# Patient Record
Sex: MALE | Race: WHITE | NOT HISPANIC OR LATINO | Employment: PART TIME | URBAN - METROPOLITAN AREA
[De-identification: names, ages, dates, MRNs, and addresses within clinical notes are randomized per-mention and may not be internally consistent; named-entity substitution may affect disease eponyms.]

---
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
## 2018-01-15 DIAGNOSIS — L70.0 ACNE VULGARIS: ICD-10-CM

## 2018-01-15 RX ORDER — DOXYCYCLINE HYCLATE 100 MG
100 TABLET ORAL 2 TIMES DAILY
Qty: 180 TAB | Refills: 0 | Status: SHIPPED | OUTPATIENT
Start: 2018-01-15 | End: 2018-04-16

## 2018-01-15 NOTE — TELEPHONE ENCOUNTER
Was the patient seen in the last year in this department? Yes     Does patient have an active prescription for medications requested? Yes     Received Request Via: Pharmacy     Requesting 90 day supply

## 2018-03-14 ENCOUNTER — OFFICE VISIT (OUTPATIENT)
Dept: MEDICAL GROUP | Facility: MEDICAL CENTER | Age: 18
End: 2018-03-14
Payer: COMMERCIAL

## 2018-03-14 VITALS
TEMPERATURE: 97 F | RESPIRATION RATE: 16 BRPM | OXYGEN SATURATION: 96 % | DIASTOLIC BLOOD PRESSURE: 72 MMHG | HEIGHT: 65 IN | WEIGHT: 200 LBS | SYSTOLIC BLOOD PRESSURE: 130 MMHG | HEART RATE: 80 BPM | BODY MASS INDEX: 33.32 KG/M2

## 2018-03-14 DIAGNOSIS — Z23 NEED FOR HPV VACCINATION: ICD-10-CM

## 2018-03-14 DIAGNOSIS — Z23 NEED FOR MENINGITIS VACCINATION: ICD-10-CM

## 2018-03-14 DIAGNOSIS — F41.1 GAD (GENERALIZED ANXIETY DISORDER): ICD-10-CM

## 2018-03-14 DIAGNOSIS — E66.9 OBESITY (BMI 30-39.9): ICD-10-CM

## 2018-03-14 PROBLEM — S52.502D: Status: RESOLVED | Noted: 2017-12-22 | Resolved: 2018-03-14

## 2018-03-14 PROCEDURE — 90734 MENACWYD/MENACWYCRM VACC IM: CPT | Performed by: NURSE PRACTITIONER

## 2018-03-14 PROCEDURE — 90472 IMMUNIZATION ADMIN EACH ADD: CPT | Performed by: NURSE PRACTITIONER

## 2018-03-14 PROCEDURE — 90471 IMMUNIZATION ADMIN: CPT | Performed by: NURSE PRACTITIONER

## 2018-03-14 PROCEDURE — 90651 9VHPV VACCINE 2/3 DOSE IM: CPT | Performed by: NURSE PRACTITIONER

## 2018-03-14 PROCEDURE — 99214 OFFICE O/P EST MOD 30 MIN: CPT | Mod: 25 | Performed by: NURSE PRACTITIONER

## 2018-03-14 ASSESSMENT — ENCOUNTER SYMPTOMS: NERVOUS/ANXIOUS: 1

## 2018-03-14 NOTE — PROGRESS NOTES
"Subjective:      Mike Miller is a 17 y.o. male who presents with Anxiety        CC: Very polite 17-year-old male here today for anxiety concerns.    HPI Mike Miller      1. IRMA (generalized anxiety disorder)  Patient is followed by psychiatry for depression and has been doing well on Wellbutrin 150 mg daily. He states however he now has developed anxiety problems. He states that for the past 3 months he is having almost constant anxiety and describes it as \"raging\". He states he has had issues with anxiety in the past and he would like to take medicine that is not addicting to help with this. It appears he no showed for his February 14 appointment with psychiatry and then his appointment in 2 days was rescheduled and now he does not have an appointment until April and wants to start a medicine before then. A noticeable tic is present and he states this has occurred on and off for years and only starts when he becomes especially anxious. He is currently attending school.    2. Need for HPV vaccination  Patient appears to be due for his second HPV vaccine.    3. Need for meningitis vaccination  Patient due for meningitis vaccine.    4. Obesity (BMI 30-39.9)  BMI elevated.  Social History   Substance Use Topics   • Smoking status: Never Smoker   • Smokeless tobacco: Never Used   • Alcohol use No     Current Outpatient Prescriptions   Medication Sig Dispense Refill   • sertraline (ZOLOFT) 50 MG Tab Take 1 Tab by mouth every day. 30 Tab 11   • doxycycline (VIBRAMYCIN) 100 MG Tab Take 1 Tab by mouth 2 Times a Day. 180 Tab 0   • Melatonin 10 MG Tab Take  by mouth every day.     • buPROPion (WELLBUTRIN XL) 150 MG XL tablet Take 1 Tab by mouth every morning. 30 Tab 6     No current facility-administered medications for this visit.      Family History   Problem Relation Age of Onset   • Hypertension Father    • Heart Disease Father      s/p CABG   • Depression Father    • Other Father      Spinal stenosis   • " "Depression Mother      on Wellbutrin     Past Medical History:   Diagnosis Date   • Asthma in remission    • Cold 12/21/2017    \"I had a head cold about 2 weeks ago\"  denies SOB, productive cough   • Psychiatric problem 12/2017    depression       Review of Systems   Psychiatric/Behavioral: The patient is nervous/anxious.    All other systems reviewed and are negative.         Objective:     /72   Pulse 80   Temp 36.1 °C (97 °F)   Resp 16   Ht 1.651 m (5' 5\")   Wt 90.7 kg (200 lb)   SpO2 96%   BMI 33.28 kg/m²      Physical Exam   Constitutional: He is oriented to person, place, and time. He appears well-developed and well-nourished. No distress.   HENT:   Head: Normocephalic and atraumatic.   Right Ear: External ear normal.   Left Ear: External ear normal.   Nose: Nose normal.   Mouth/Throat: Oropharynx is clear and moist.   Eyes: Conjunctivae are normal. Right eye exhibits no discharge. Left eye exhibits no discharge.   Neck: Normal range of motion. Neck supple. No tracheal deviation present. No thyromegaly present.   Cardiovascular: Normal rate, regular rhythm and normal heart sounds.    No murmur heard.  Pulmonary/Chest: Effort normal and breath sounds normal. No respiratory distress. He has no wheezes. He has no rales.   Lymphadenopathy:     He has no cervical adenopathy.   Neurological: He is alert and oriented to person, place, and time. Coordination normal.   Tic of upper extremities noted during visit which comes and goes.   Skin: Skin is warm and dry. No rash noted. He is not diaphoretic. No erythema.   Psychiatric: His behavior is normal. Judgment and thought content normal. His mood appears anxious.   Nursing note and vitals reviewed.              Assessment/Plan:     1. IRMA (generalized anxiety disorder)  Patient is requesting medication to start treating his anxiety now because he feels it is very high. He does not want a benzodiazepine medicine and I discussed options including BuSpar or " an SSRI. We decided to go with 50 mg Zoloft, half tablet daily for first week and then going to one tablet daily to use with his Wellbutrin. I strongly advised him to be sure he makes his appointment with psychiatry to look at options. He did not feel he needed a neurology referral for his Tenex. His last blood work showed normal TSH.  - sertraline (ZOLOFT) 50 MG Tab; Take 1 Tab by mouth every day.  Dispense: 30 Tab; Refill: 11    2. Need for HPV vaccination  I have placed the below orders and discussed them with an approved delegating provider. The MA is performing the below orders under the direction of Dr. Stone    - 9VHPV VACCINE 2-3 DOSE IM    3. Need for meningitis vaccination  I have placed the below orders and discussed them with an approved delegating provider. The MA is performing the below orders under the direction of Dr. Stone    - MENINGOCOCCAL CONJUGATE VACCINE 4-VALENT IM    4. Obesity (BMI 30-39.9)    - Patient identified as having weight management issue.  Appropriate orders and counseling given.

## 2018-04-16 ENCOUNTER — OFFICE VISIT (OUTPATIENT)
Dept: BEHAVIORAL HEALTH | Facility: PHYSICIAN GROUP | Age: 18
End: 2018-04-16
Payer: COMMERCIAL

## 2018-04-16 VITALS
WEIGHT: 196 LBS | BODY MASS INDEX: 32.65 KG/M2 | SYSTOLIC BLOOD PRESSURE: 153 MMHG | HEIGHT: 65 IN | DIASTOLIC BLOOD PRESSURE: 81 MMHG | HEART RATE: 92 BPM

## 2018-04-16 DIAGNOSIS — F32.A DEPRESSIVE DISORDER: ICD-10-CM

## 2018-04-16 DIAGNOSIS — F40.10 SOCIAL ANXIETY DISORDER: ICD-10-CM

## 2018-04-16 DIAGNOSIS — F41.1 GAD (GENERALIZED ANXIETY DISORDER): ICD-10-CM

## 2018-04-16 PROBLEM — F41.9 ANXIETY DISORDER: Status: ACTIVE | Noted: 2018-04-16

## 2018-04-16 PROCEDURE — 99214 OFFICE O/P EST MOD 30 MIN: CPT | Performed by: PSYCHIATRY & NEUROLOGY

## 2018-04-16 RX ORDER — BUPROPION HYDROCHLORIDE 150 MG/1
150 TABLET, EXTENDED RELEASE ORAL EVERY MORNING
Qty: 90 TAB | Refills: 1 | Status: SHIPPED | OUTPATIENT
Start: 2018-04-16 | End: 2018-10-16 | Stop reason: SDUPTHER

## 2018-04-16 ASSESSMENT — PATIENT HEALTH QUESTIONNAIRE - PHQ9: CLINICAL INTERPRETATION OF PHQ2 SCORE: 0

## 2018-05-17 DIAGNOSIS — L70.0 ACNE VULGARIS: ICD-10-CM

## 2018-05-17 RX ORDER — DOXYCYCLINE HYCLATE 100 MG
100 TABLET ORAL 2 TIMES DAILY
Qty: 180 TAB | Refills: 3 | Status: SHIPPED | OUTPATIENT
Start: 2018-05-17 | End: 2020-05-13

## 2018-08-02 ENCOUNTER — OFFICE VISIT (OUTPATIENT)
Dept: BEHAVIORAL HEALTH | Facility: PHYSICIAN GROUP | Age: 18
End: 2018-08-02
Payer: COMMERCIAL

## 2018-08-02 DIAGNOSIS — F40.10 SOCIAL ANXIETY DISORDER: ICD-10-CM

## 2018-08-02 DIAGNOSIS — F41.1 GAD (GENERALIZED ANXIETY DISORDER): ICD-10-CM

## 2018-08-02 PROCEDURE — 90791 PSYCH DIAGNOSTIC EVALUATION: CPT | Performed by: PSYCHOLOGIST
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
## 2018-09-05 ENCOUNTER — OFFICE VISIT (OUTPATIENT)
Dept: BEHAVIORAL HEALTH | Facility: PHYSICIAN GROUP | Age: 18
End: 2018-09-05
Payer: COMMERCIAL

## 2018-09-05 DIAGNOSIS — F40.10 SOCIAL ANXIETY DISORDER: ICD-10-CM

## 2018-09-05 DIAGNOSIS — F41.1 GAD (GENERALIZED ANXIETY DISORDER): ICD-10-CM

## 2018-09-05 PROCEDURE — 90834 PSYTX W PT 45 MINUTES: CPT | Performed by: PSYCHOLOGIST

## 2018-09-05 NOTE — BH THERAPY
[facility] Therapy Progress Note    Patient Name: Mike Miller  Patient MRN: [id number]  Today's Date: 9/5/2018     Type of session:Individual psychotherapy  Length of session: 45 minutes  Persons in attendance:Patient    Subjective/New Info: The patient ID/Chief Complaint:  The patient is a 18 year old male, single, .  The patient was referred by psychiatry and voluntarily presented for an individual intake to address anxiety symptoms including chronic worrying and mild to moderate social anxiety with some evidence of vocal tic related to anxiety.  Reviewed limits of confidentiality and [facility] policies; patient expressed understanding and agreed to voluntarily proceed with evaluation and treatment.     Interval History:   Session Focus: The patient's estimated global assessment of functioning suggests a moderate degree of emotional distress and difficulty dealing with relationships, work and/or school life. The patient's affective and emotional state appeared moderately anxious. He has expereinced some improvement regarding anxiety symptoms continues to use marijuana to assist with sleep. Educated the patient about elements associated with the treatment of depression and anxiety.    Therapeutic Intervention: Cognitive Behavioral Therapy      Planned Intervention: examining automatic thought and using thought records    Objective/Observations:      Patient did not present in acute distress. Patient was appropriately groomed. Patient was alert and oriented x4. Eye contact was appropriate. No abnormalities in attention or concentration were noted. No abnormalities of movement present; psychomotor activity was normal. Speech was fluent and regular in rhythm, rate, volume, and tone. Thought processes Linear, Logical and Goal Directed. There was no evidence of thought disorder. No auditory or visual hallucinations. Long and short term memory appeared to be intact.  Insight, judgment, and impulse control were deemed to be good.  Reported mood was “anxious.” Affect was full-ranging and appropriate to thought content and conversation.  Patient denied past and current suicidal and homicidal ideation in plan, intent, and preparatory behavior.         Diagnoses:   1. IRMA (generalized anxiety disorder)    2. Social anxiety disorder       The patient denied any suicide-related ideation and/or behaviors and intent/plan, denied thoughts about death and dying both in session and during the period since last appointment, or past 2 weeks.    Risk Level: Not Currently at Clinically Significant Risk  Hospitalization is not deemed necessary at this time as the patient does not present a clear or imminent danger to self or others. No indication for pursuing higher level of care. Outpatient management is currently most appropriate and least restrictive level of care.  Current risk:   SUICIDE: Low   Homicide: Not applicable   Self-harm: Not applicable   Relapse: Not applicable   Other:    Safety Plan reviewed? Not Indicated   If evidence of imminent risk is present, intervention/plan:         Treatment Goal(s)/Objective(s) addressed:    Anxiety   Goal: Develop strategies to reduce symptoms, or   Reduce anxiety and improve coping skills   Report feeling more positive about self and abilities during therapy sessions   Develop strategies for thought distraction when fixating on the future     Progress toward Treatment Goals: Mild improvement    Plan:  - Next appointment scheduled:  10/3/2018    Jordin Chavez III, Ed.D.  9/5/2018

## 2018-10-18 ENCOUNTER — OFFICE VISIT (OUTPATIENT)
Dept: BEHAVIORAL HEALTH | Facility: CLINIC | Age: 18
End: 2018-10-18
Payer: COMMERCIAL

## 2018-10-18 DIAGNOSIS — F40.10 SOCIAL ANXIETY DISORDER: ICD-10-CM

## 2018-10-18 DIAGNOSIS — F41.1 GAD (GENERALIZED ANXIETY DISORDER): ICD-10-CM

## 2018-10-18 PROCEDURE — 90834 PSYTX W PT 45 MINUTES: CPT | Performed by: PSYCHOLOGIST

## 2018-10-18 NOTE — BH THERAPY
[facility] Therapy Progress Note    Patient Name: Mike Miller  Patient MRN: [id number]  Today's Date: 10/18/2018     Type of session:Individual psychotherapy  Length of session: 45 minutes  Persons in attendance:Patient    Subjective/New Info: The patient ID/Chief Complaint:  The patient is a 18 year old male, single, .  The patient was referred by psychiatry and voluntarily presented for an individual intake to address anxiety symptoms including chronic worrying and mild to moderate social anxiety with some evidence of vocal tic related to anxiety.  Reviewed limits of confidentiality and [facility] policies; patient expressed understanding and agreed to voluntarily proceed with evaluation and treatment.     Interval History:   Session Focus: The patient's estimated global assessment of functioning suggests a moderate degree of emotional distress and difficulty dealing with relationships, work and/or school life. The patient's affective and emotional state appeared mildy anxious. He has expereinced some improvement regarding anxiety symptoms continues to use marijuana to assist with sleep. Explored with the patient his social anxiety    Therapeutic Intervention: Cognitive Behavioral Therapy      Planned Intervention: examining automatic thought and using thought records    Objective/Observations:      Patient did not present in acute distress. Patient was appropriately groomed. Patient was alert and oriented x4. Eye contact was appropriate. No abnormalities in attention or concentration were noted. No abnormalities of movement present; psychomotor activity was normal. Speech was fluent and regular in rhythm, rate, volume, and tone. Thought processes Linear, Logical and Goal Directed. There was no evidence of thought disorder. No auditory or visual hallucinations. Long and short term memory appeared to be intact. Insight, judgment, and impulse control were deemed to  be good.  Reported mood was “anxious.” Affect was full-ranging and appropriate to thought content and conversation.  Patient denied past and current suicidal and homicidal ideation in plan, intent, and preparatory behavior.         Diagnoses:   1. IRMA (generalized anxiety disorder)    2. Social anxiety disorder       The patient denied any suicide-related ideation and/or behaviors and intent/plan, denied thoughts about death and dying both in session and during the period since last appointment, or past 2 weeks.    Risk Level: Not Currently at Clinically Significant Risk  Hospitalization is not deemed necessary at this time as the patient does not present a clear or imminent danger to self or others. No indication for pursuing higher level of care. Outpatient management is currently most appropriate and least restrictive level of care.  Current risk:   SUICIDE: Low   Homicide: Not applicable   Self-harm: Not applicable   Relapse: Not applicable   Other:    Safety Plan reviewed? Not Indicated   If evidence of imminent risk is present, intervention/plan:         Treatment Goal(s)/Objective(s) addressed:    Anxiety   Goal: Develop strategies to reduce symptoms, or   Reduce anxiety and improve coping skills   Report feeling more positive about self and abilities during therapy sessions   Develop strategies for thought distraction when fixating on the future     Progress toward Treatment Goals: Mild improvement    Plan:    1) The patient will return to the clinic 2-3 weeks.  2) Crisis Response Plan:  Reviewed emergency resources with the patient and the patient expressed understanding including:  If feeling suicidal, patient will call or present to the [facility] during duty hours or present to closest ED ([facility] or [facility], call 911 or crisis hotline (6-782-247-OFUN) after duty hours.  3) Referrals/Consults:  N/A  4) Barriers to Learning:   No  5) Readiness to Learn:  Yes  6) Cultural Concerns:  No  7) Patient voiced understanding of, and agreement with, plan and goals as annotated above.  8) Declare these services are medically necessary and appropriate to the patient’s diagnosis and needs  9) The point of contact at the [facility] regarding this evaluation is Dr. Chavez, Psychologist.      Jordin Chavez III, Ed.D.  10/18/2018

## 2018-12-06 ENCOUNTER — OFFICE VISIT (OUTPATIENT)
Dept: BEHAVIORAL HEALTH | Facility: CLINIC | Age: 18
End: 2018-12-06
Payer: COMMERCIAL

## 2018-12-06 DIAGNOSIS — F41.1 GAD (GENERALIZED ANXIETY DISORDER): ICD-10-CM

## 2018-12-06 DIAGNOSIS — F40.10 SOCIAL ANXIETY DISORDER: ICD-10-CM

## 2018-12-06 PROCEDURE — 90834 PSYTX W PT 45 MINUTES: CPT | Performed by: PSYCHOLOGIST

## 2018-12-07 NOTE — BH THERAPY
[facility]  Therapy Progress Note    Patient Name: Mike Miller  Patient MRN: [id number]  Today's Date: 12/6/2018     Type of session:Individual psychotherapy  Length of session: 45 minutes  Persons in attendance:Patient    Subjective/New Info: The patient ID/Chief Complaint:  The patient is a 18 year old male, single, .  The patient was referred by psychiatry and voluntarily presented for an individual intake to address anxiety symptoms including chronic worrying and mild to moderate social anxiety with some evidence of vocal tic related to anxiety.  Reviewed limits of confidentiality and [facility] policies; patient expressed understanding and agreed to voluntarily proceed with evaluation and treatment.     Interval History:   Session Focus: The patient's estimated global assessment of functioning suggests a moderate degree of emotional distress and difficulty dealing with relationships, work and/or school life. The patient's affective and emotional state appeared mildy anxious.  Patient reports some improvement in social anxiety is interested in moving forward in unstructured activities to examine his ability to socially interact.  Educated the patient about how the use of cannabis may not be helpful in dealing with his social anxiety as he may contribute his success to the cannabis versus his hard work.  The patient was able to identify the cognitive error of jumping to conclusion as one of the primary reasons he disengages in social interactions.  Use problem solving to explore ways the patient could increase his social interactions in a semi-structured environment and/or preparing for interactions by having a set of questions.  Patient does recognize that his most difficult part of the social interaction is the initiation versus the maintenance.    Therapeutic Intervention: Cognitive Behavioral Therapy      Planned Intervention: examining automatic thought and  using thought records    Objective/Observations:      Patient did not present in acute distress. Patient was appropriately groomed. Patient was alert and oriented x4. Eye contact was appropriate. No abnormalities in attention or concentration were noted. No abnormalities of movement present; psychomotor activity was normal. Speech was fluent and regular in rhythm, rate, volume, and tone. Thought processes Linear, Logical and Goal Directed. There was no evidence of thought disorder. No auditory or visual hallucinations. Long and short term memory appeared to be intact. Insight, judgment, and impulse control were deemed to be good.  Reported mood was “anxious.” Affect was full-ranging and appropriate to thought content and conversation.  Patient denied past and current suicidal and homicidal ideation in plan, intent, and preparatory behavior.         Diagnoses:   1. IRMA (generalized anxiety disorder)    2. Social anxiety disorder       The patient denied any suicide-related ideation and/or behaviors and intent/plan, denied thoughts about death and dying both in session and during the period since last appointment, or past 2 weeks.    Risk Level: Not Currently at Clinically Significant Risk  Hospitalization is not deemed necessary at this time as the patient does not present a clear or imminent danger to self or others. No indication for pursuing higher level of care. Outpatient management is currently most appropriate and least restrictive level of care.  Current risk:   SUICIDE: Low   Homicide: Not applicable   Self-harm: Not applicable   Relapse: Not applicable   Other:    Safety Plan reviewed? Not Indicated   If evidence of imminent risk is present, intervention/plan:         Treatment Goal(s)/Objective(s) addressed:    Anxiety   Goal: Develop strategies to reduce symptoms, or   Reduce anxiety and improve coping skills   Report feeling more positive about self and abilities during therapy sessions   Develop  strategies for thought distraction when fixating on the future     Progress toward Treatment Goals: Mild improvement    Plan:    1) The patient will return to the clinic 2-3 weeks.  2) Crisis Response Plan:  Reviewed emergency resources with the patient and the patient expressed understanding including:  If feeling suicidal, patient will call or present to the [facility] during duty hours or present to closest ED ([facility] or [facility], call 911 or crisis hotline ([phone]-BWKT) after duty hours.  3) Referrals/Consults:  N/A  4) Barriers to Learning:  No  5) Readiness to Learn:  Yes  6) Cultural Concerns:  No  7) Patient voiced understanding of, and agreement with, plan and goals as annotated above.  8) Declare these services are medically necessary and appropriate to the patient’s diagnosis and needs  9) The point of contact at the [facility] regarding this evaluation is Dr. Chavez, Psychologist.      Jordin Chavez III, Ed.D.  12/6/2018

## 2019-01-03 ENCOUNTER — OFFICE VISIT (OUTPATIENT)
Dept: BEHAVIORAL HEALTH | Facility: CLINIC | Age: 19
End: 2019-01-03
Payer: COMMERCIAL

## 2019-01-03 DIAGNOSIS — F41.1 GAD (GENERALIZED ANXIETY DISORDER): ICD-10-CM

## 2019-01-03 DIAGNOSIS — F40.10 SOCIAL ANXIETY DISORDER: ICD-10-CM

## 2019-01-03 PROCEDURE — 90834 PSYTX W PT 45 MINUTES: CPT | Performed by: PSYCHOLOGIST

## 2019-01-04 NOTE — BH THERAPY
[facility]  Therapy Progress Note    Patient Name: Mike Miller  Patient MRN: [id number]  Today's Date: 1/3/2019    Type of session:Individual psychotherapy  Length of session: 45 minutes  Persons in attendance:Patient    Subjective/New Info: The patient ID/Chief Complaint:  The patient is a 18 year old male, single, .  The patient was referred by psychiatry and voluntarily presented for an individual intake to address anxiety symptoms including chronic worrying and mild to moderate social anxiety with some evidence of vocal tic related to anxiety.  Reviewed limits of confidentiality and [facility] policies; patient expressed understanding and agreed to voluntarily proceed with evaluation and treatment.     Interval History:   Session Focus: The patient's estimated global assessment of functioning suggests a moderate degree of emotional distress and difficulty dealing with relationships, work and/or school life. The patient's affective and emotional state appeared mildy anxious.  Discussed with patient barriers and automatic thoughts associated with social anxiety.  The patient was able to identify that he frequently does not let people in for fear that they may take advantage of him at the present time he has a small number and many of them have similar characteristics.  On a more positive note the patient did follow through and asked a girl out on the date.  Patient is becoming increasingly receptive that his use of cannabis may be one of the things that is contributing to his anxiety condition.  Patient is doing a good job of using thought records as a means of challenging automatic thoughts and is beginning to understand cognitive errors associated with his anxiety    Therapeutic Intervention: Cognitive Behavioral Therapy      Planned Intervention: examining automatic thought and using thought records    Objective/Observations:        Patient did not present in  acute distress. Patient was appropriately groomed. Patient was alert and oriented x4. Eye contact was appropriate. No abnormalities in attention or concentration were noted. No abnormalities of movement present; psychomotor activity was normal. Speech was fluent and regular in rhythm, rate, volume, and tone. Thought processes linear, logical and goal directed. There was no evidence of thought disorder. No auditory or visual hallucinations. Long and short term memory appeared to be intact. Insight, judgment, and impulse control were deemed to be good.  Reported mood was “anxious.” Affect was full-ranging and appropriate to thought content and conversation.  Patient denied past and current suicidal and homicidal ideation in plan, intent, and preparatory behavior.         Diagnoses:   1. Social anxiety disorder    2. IRMA (generalized anxiety disorder)       The patient denied any suicide-related ideation and/or behaviors and intent/plan, denied thoughts about death and dying both in session and during the period since last appointment, or past 2 weeks.    Risk Level: Not Currently at Clinically Significant Risk  Hospitalization is not deemed necessary at this time as the patient does not present a clear or imminent danger to self or others. No indication for pursuing higher level of care. Outpatient management is currently most appropriate and least restrictive level of care.  Current risk:   SUICIDE: Low   Homicide: Not applicable   Self-harm: Not applicable   Relapse: Not applicable   Other:    Safety Plan reviewed? Not Indicated   If evidence of imminent risk is present, intervention/plan:         Treatment Goal(s)/Objective(s) addressed:    Anxiety   Goal: Develop strategies to reduce symptoms, or   Reduce anxiety and improve coping skills   Report feeling more positive about self and abilities during therapy sessions   Develop strategies for thought distraction when fixating on the future     Progress toward  Treatment Goals: Mild improvement    Plan:    1) The patient will return to the clinic 2-3 weeks.  2) Crisis Response Plan:  Reviewed emergency resources with the patient and the patient expressed understanding including:  If feeling suicidal, patient will call or present to the [facility] during duty hours or present to closest ED ([facility] or [facility], call 911 or crisis hotline ([phone]-RINF) after duty hours.  3) Referrals/Consults:  N/A  4) Barriers to Learning:  No  5) Readiness to Learn:  Yes  6) Cultural Concerns:  No  7) Patient voiced understanding of, and agreement with, plan and goals as annotated above.  8) Declare these services are medically necessary and appropriate to the patient’s diagnosis and needs  9) The point of contact at the [facility] regarding this evaluation is Dr. Chavez, Psychologist.      Jordin Chavez III, Ed.D.  1/3/2019

## 2019-01-24 ENCOUNTER — OFFICE VISIT (OUTPATIENT)
Dept: BEHAVIORAL HEALTH | Facility: CLINIC | Age: 19
End: 2019-01-24
Payer: COMMERCIAL

## 2019-01-24 DIAGNOSIS — F40.10 SOCIAL ANXIETY DISORDER: ICD-10-CM

## 2019-01-24 DIAGNOSIS — F41.1 GAD (GENERALIZED ANXIETY DISORDER): ICD-10-CM

## 2019-01-24 PROCEDURE — 90834 PSYTX W PT 45 MINUTES: CPT | Performed by: PSYCHOLOGIST

## 2019-01-24 RX ORDER — BUPROPION HYDROCHLORIDE 150 MG/1
150 TABLET, EXTENDED RELEASE ORAL EVERY MORNING
Qty: 90 TAB | Refills: 0 | Status: SHIPPED | OUTPATIENT
Start: 2019-01-24 | End: 2019-03-27

## 2019-01-24 NOTE — BH THERAPY
[facility] Therapy Progress Note    Patient Name: Mike Miller  Patient MRN: [id number]  Today's Date: 1/24/2019    Type of session:Individual psychotherapy  Length of session: 45 minutes  Persons in attendance:Patient    Subjective/New Info: The patient ID/Chief Complaint:  The patient is a 18 year old male, single, .  The patient was referred by psychiatry and voluntarily presented for an individual intake to address anxiety symptoms including chronic worrying and mild to moderate social anxiety with some evidence of vocal tic related to anxiety.  Reviewed limits of confidentiality and [facility] policies; patient expressed understanding and agreed to voluntarily proceed with evaluation and treatment.     Interval History:   Session Focus: The patient's estimated global assessment of functioning suggests a moderate degree of emotional distress and difficulty dealing with relationships, work and/or school life. The patient's affective and emotional state appeared mildy anxious.  The patient is increasing his awareness of the impact of his social anxiety.  He reported that he is about ready to run out of his Wellbutrin but he recently missed an appointment with his psychiatrist.  Consulted the psychiatrist about the possibility of a refill.  The patient is definitely increasing his awareness dysfunctional schemas associated with anxiety and starting the challenge some of his beliefs about social interactions.    Therapeutic Intervention: Cognitive Behavioral Therapy      Planned Intervention: examining automatic thought and using thought records    Objective/Observations:        Patient did not present in acute distress. Patient was appropriately groomed. Patient was alert and oriented x4. Eye contact was appropriate. No abnormalities in attention or concentration were noted. No abnormalities of movement present; psychomotor activity was normal. Speech was fluent and  regular in rhythm, rate, volume, and tone. Thought processes linear, logical and goal directed. There was no evidence of thought disorder. No auditory or visual hallucinations. Long and short term memory appeared to be intact. Insight, judgment, and impulse control were deemed to be good.  Reported mood was “anxious.” Affect was full-ranging and appropriate to thought content and conversation.  Patient denied past and current suicidal and homicidal ideation in plan, intent, and preparatory behavior.         Diagnoses:   1. IRMA (generalized anxiety disorder)    2. Social anxiety disorder       The patient denied any suicide-related ideation and/or behaviors and intent/plan, denied thoughts about death and dying both in session and during the period since last appointment, or past 2 weeks.    Risk Level: Not Currently at Clinically Significant Risk  Hospitalization is not deemed necessary at this time as the patient does not present a clear or imminent danger to self or others. No indication for pursuing higher level of care. Outpatient management is currently most appropriate and least restrictive level of care.  Current risk:   SUICIDE: Low   Homicide: Not applicable   Self-harm: Not applicable   Relapse: Not applicable   Other:    Safety Plan reviewed? Not Indicated   If evidence of imminent risk is present, intervention/plan:         Treatment Goal(s)/Objective(s) addressed:    Anxiety   Goal: Develop strategies to reduce symptoms, or   Reduce anxiety and improve coping skills   Report feeling more positive about self and abilities during therapy sessions   Develop strategies for thought distraction when fixating on the future     Progress toward Treatment Goals: Mild improvement    Plan:    1) The patient will return to the clinic 2-3 weeks.  2) Crisis Response Plan:  Reviewed emergency resources with the patient and the patient expressed understanding including:  If feeling suicidal, patient will call or present  to the [facility] during duty hours or present to closest ED ([facility] or [facility], call 911 or crisis hotline ([phone]-XCCC) after duty hours.  3) Referrals/Consults:  N/A  4) Barriers to Learning:  No  5) Readiness to Learn:  Yes  6) Cultural Concerns:  No  7) Patient voiced understanding of, and agreement with, plan and goals as annotated above.  8) Declare these services are medically necessary and appropriate to the patient’s diagnosis and needs  9) The point of contact at the [facility] regarding this evaluation is Dr. Chavez, Psychologist.      Jordin Chavez III, Ed.D.  1/24/2019

## 2019-02-25 ENCOUNTER — OFFICE VISIT (OUTPATIENT)
Dept: BEHAVIORAL HEALTH | Facility: CLINIC | Age: 19
End: 2019-02-25
Payer: COMMERCIAL

## 2019-02-25 DIAGNOSIS — F40.10 SOCIAL ANXIETY DISORDER: ICD-10-CM

## 2019-02-25 DIAGNOSIS — F41.1 GAD (GENERALIZED ANXIETY DISORDER): ICD-10-CM

## 2019-02-25 PROCEDURE — 90834 PSYTX W PT 45 MINUTES: CPT | Performed by: PSYCHOLOGIST

## 2019-02-25 NOTE — BH THERAPY
[facility] Therapy Progress Note    Patient Name: Mike Miller  Patient MRN: [id number]  Today's Date: 2/25/2019    Type of session:Individual psychotherapy  Length of session: 45 minutes  Persons in attendance:Patient    Subjective/New Info: The patient ID/Chief Complaint:  The patient is a 18 year old male, single, .  The patient was referred by psychiatry and voluntarily presented for an individual intake to address anxiety symptoms including chronic worrying and mild to moderate social anxiety with some evidence of vocal tic related to anxiety.  Reviewed limits of confidentiality and [facility] policies; patient expressed understanding and agreed to voluntarily proceed with evaluation and treatment.     Interval History:   Session Focus: The patient's estimated global assessment of functioning suggests a moderate degree of emotional distress and difficulty dealing with relationships, work and/or school life. The patient's affective and emotional state appeared mildy anxious.  The patient continues to examine automatic thoughts and schemas associated with his social anxiety and his general anxiety response.  The patient also recognizes some of his behaviors are designed to prevent others from establishing relationships because of the fear of being rejected.    Therapeutic Intervention: Cognitive Behavioral Therapy      Planned Intervention: examining automatic thought and using thought records    Objective/Observations:      Patient did not present in acute distress. Patient was appropriately groomed. Patient was alert and oriented x4. Eye contact was appropriate. No abnormalities in attention or concentration were noted. No abnormalities of movement present; psychomotor activity was normal. Speech was fluent and regular in rhythm, rate, volume, and tone. Thought processes linear, logical and goal directed. There was no evidence of thought disorder. No auditory or  visual hallucinations. Long and short term memory appeared to be intact. Insight, judgment, and impulse control were deemed to be good.  Reported mood was “comfortable.” Affect was full-ranging and appropriate to thought content and conversation.  Patient denied past and current suicidal and homicidal ideation in plan, intent, and preparatory behavior.         Diagnoses:   1. IRMA (generalized anxiety disorder)    2. Social anxiety disorder       The patient denied any suicide-related ideation and/or behaviors and intent/plan, denied thoughts about death and dying both in session and during the period since last appointment, or past 2 weeks.    Risk Level: Not Currently at Clinically Significant Risk  Hospitalization is not deemed necessary at this time as the patient does not present a clear or imminent danger to self or others. No indication for pursuing higher level of care. Outpatient management is currently most appropriate and least restrictive level of care.  Current risk:   SUICIDE: Low   Homicide: Not applicable   Self-harm: Not applicable   Relapse: Not applicable   Other:    Safety Plan reviewed? Not Indicated   If evidence of imminent risk is present, intervention/plan:         Treatment Goal(s)/Objective(s) addressed:    Anxiety   Goal: Develop strategies to reduce symptoms, or   Reduce anxiety and improve coping skills   Report feeling more positive about self and abilities during therapy sessions   Develop strategies for thought distraction when fixating on the future     Progress toward Treatment Goals: Mild improvement    Plan:    1) The patient will return to the clinic 2-3 weeks.  2) Crisis Response Plan:  Reviewed emergency resources with the patient and the patient expressed understanding including:  If feeling suicidal, patient will call or present to the [facility] during duty hours or present to closest ED ([facility] or [facility],  call 911 or crisis hotline (2-403-237-PWUB) after duty hours.  3) Referrals/Consults:  N/A  4) Barriers to Learning:  No  5) Readiness to Learn:  Yes  6) Cultural Concerns:  No  7) Patient voiced understanding of, and agreement with, plan and goals as annotated above.  8) Declare these services are medically necessary and appropriate to the patient’s diagnosis and needs  9) The point of contact at the [facility] regarding this evaluation is Dr. Chavez, Psychologist.      Jordin Chavez III, Ed.D.  [id number]

## 2019-03-05 ENCOUNTER — OFFICE VISIT (OUTPATIENT)
Dept: BEHAVIORAL HEALTH | Facility: CLINIC | Age: 19
End: 2019-03-05
Payer: COMMERCIAL

## 2019-03-05 DIAGNOSIS — F41.1 GAD (GENERALIZED ANXIETY DISORDER): ICD-10-CM

## 2019-03-05 DIAGNOSIS — F40.10 SOCIAL ANXIETY DISORDER: ICD-10-CM

## 2019-03-05 PROCEDURE — 90834 PSYTX W PT 45 MINUTES: CPT | Performed by: PSYCHOLOGIST

## 2019-03-06 NOTE — BH THERAPY
[facility]  Therapy Progress Note    Patient Name: Mike Miller  Patient MRN: [id number]  Today's Date: 3/5/2019    Type of session:Individual psychotherapy  Length of session: 50 minutes  Persons in attendance:Patient    Subjective/New Info: The patient ID/Chief Complaint:  The patient is a 18 year old male, single, .  The patient was referred by psychiatry and voluntarily presented for an individual intake to address anxiety symptoms including chronic worrying and mild to moderate social anxiety with some evidence of vocal tic related to anxiety.  Reviewed limits of confidentiality and [facility] policies; patient expressed understanding and agreed to voluntarily proceed with evaluation and treatment.     Interval History:   Session Focus: The patient's estimated global assessment of functioning suggests a moderate degree of emotional distress and difficulty dealing with relationships, work and/or school life. The patient's affective and emotional state appeared mildy anxious.  The patient reports over the last few weeks he is experienced worsening of anxiety symptoms and depressive symptoms he does recognize that these difficulties started shortly after he had to put his dog down.  Patient also discussed today concerns in the past that he has had about being physically ill with no objective evidence from physical examination and lab work.  Discussed with the patient the possible benefits of psychological testing in order to provide additional clarification and comparison of his symptoms to a known population.  The patient indicated that he would think about the benefits of psychological testing in the future.  Encouraged the patient to continue challenging negative automatic thoughts associated with his social anxiety.    Therapeutic Intervention: Cognitive Behavioral Therapy      Planned Intervention: examining automatic thought and using thought  records    Objective/Observations:      Patient did not present in acute distress. Patient was appropriately groomed. Patient was alert and oriented x4. Eye contact was appropriate. No abnormalities in attention or concentration were noted. No abnormalities of movement present; psychomotor activity was normal. Speech was fluent and regular in rhythm, rate, volume, and tone. Thought processes linear, logical and goal directed. There was no evidence of thought disorder. No auditory or visual hallucinations. Long and short term memory appeared to be intact. Insight, judgment, and impulse control were deemed to be good.  Reported mood was “anxious.” Affect was full-ranging and appropriate to thought content and conversation.  Patient denied past and current suicidal and homicidal ideation in plan, intent, and preparatory behavior.         Diagnoses:   1. Social anxiety disorder    2. IRMA (generalized anxiety disorder)       The patient denied any suicide-related ideation and/or behaviors and intent/plan, denied thoughts about death and dying both in session and during the period since last appointment, or past 2 weeks.    Risk Level: Not Currently at Clinically Significant Risk  Hospitalization is not deemed necessary at this time as the patient does not present a clear or imminent danger to self or others. No indication for pursuing higher level of care. Outpatient management is currently most appropriate and least restrictive level of care.  Current risk:   SUICIDE: Low   Homicide: Not applicable   Self-harm: Not applicable   Relapse: Not applicable   Other:    Safety Plan reviewed? Not Indicated   If evidence of imminent risk is present, intervention/plan:         Treatment Goal(s)/Objective(s) addressed:    Anxiety   Goal: Develop strategies to reduce symptoms, or   Reduce anxiety and improve coping skills   Report feeling more positive about self and abilities during therapy sessions   Develop strategies for  thought distraction when fixating on the future     Progress toward Treatment Goals: Mild improvement    Plan:    1) The patient will return to the clinic 2-3 weeks.  2) Crisis Response Plan:  Reviewed emergency resources with the patient and the patient expressed understanding including:  If feeling suicidal, patient will call or present to the [facility] during duty hours or present to closest ED ([facility] or [facility], call 911 or crisis hotline ([phone]-QPTZ) after duty hours.  3) Referrals/Consults:  N/A  4) Barriers to Learning:  No  5) Readiness to Learn:  Yes  6) Cultural Concerns:  No  7) Patient voiced understanding of, and agreement with, plan and goals as annotated above.  8) Declare these services are medically necessary and appropriate to the patient’s diagnosis and needs  9) The point of contact at the [facility] regarding this evaluation is Dr. Chavez, Psychologist.      Jordin Chavez III, Ed.D.  3/5/2019

## 2019-03-27 ENCOUNTER — OFFICE VISIT (OUTPATIENT)
Dept: BEHAVIORAL HEALTH | Facility: CLINIC | Age: 19
End: 2019-03-27
Payer: COMMERCIAL

## 2019-03-27 VITALS
HEIGHT: 65 IN | WEIGHT: 196 LBS | BODY MASS INDEX: 32.65 KG/M2 | SYSTOLIC BLOOD PRESSURE: 119 MMHG | DIASTOLIC BLOOD PRESSURE: 76 MMHG | HEART RATE: 70 BPM

## 2019-03-27 DIAGNOSIS — F32.A DEPRESSIVE DISORDER: ICD-10-CM

## 2019-03-27 DIAGNOSIS — F40.10 SOCIAL ANXIETY DISORDER: ICD-10-CM

## 2019-03-27 DIAGNOSIS — F41.1 GAD (GENERALIZED ANXIETY DISORDER): ICD-10-CM

## 2019-03-27 PROCEDURE — 99214 OFFICE O/P EST MOD 30 MIN: CPT | Performed by: PSYCHIATRY & NEUROLOGY

## 2019-03-27 RX ORDER — SERTRALINE HYDROCHLORIDE 100 MG/1
100 TABLET, FILM COATED ORAL DAILY
Qty: 90 TAB | Refills: 0 | Status: SHIPPED | OUTPATIENT
Start: 2019-03-27 | End: 2019-06-30 | Stop reason: SDUPTHER

## 2019-03-27 ASSESSMENT — PATIENT HEALTH QUESTIONNAIRE - PHQ9: CLINICAL INTERPRETATION OF PHQ2 SCORE: 0

## 2019-03-27 NOTE — PROGRESS NOTES
PSYCHIATRY FOLLOW-UP NOTE      Chief Complaint   Patient presents with   • Follow-Up     anxiety, depression         History Of Present Illness:  Mike Miller is a 19 y.o. old male with depressive disorder, generalized anxiety disorder, social anxiety disorder comes in today for follow up, was last seen over 11 months ago.  He has been doing relatively okay in regards to his anxiety and depression.  He states that he has been compliant with Zoloft although it was last refilled by me in October 2018.  However, he stopped taking his Wellbutrin about a week and a half ago as he felt that Wellbutrin was may be making him more anxious.  He is noticed that he is feeling little bit more calm now that he is not on Wellbutrin.  He is denying any active symptoms of depression at this time.  He is still having some anxiety in social situations and when he comes back home he likes to go over all the interactions he had at his workplace.  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  He is still living at home and denies any relationship stressors with his parents.  He denies any current problems with his sleep or appetite.  He has been using melatonin and cannabis every night which she feels has been helpful.  He denies anhedonia, continues to be physically active.  He denies any recent reckless or impulsive behaviors.  He decided to drop out of school and has no plans of going back at this time.  He denies having thoughts of wanting to hurt himself or others    Social History:   He is currently single, has never been  and has no kids. He lives with his parents in [place]. 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    Substance Use:  Alcohol - Infrequent alcohol use   Nicotine - Denies  Illicit drugs - Vapes cannabis at bedtime    Past Medication  "Trials:  Wellbutrin  mg (s/e - \"more anxious\")    Medications:  Current Outpatient Prescriptions   Medication Sig Dispense Refill   • sertraline (ZOLOFT) 100 MG Tab Take 1 Tab by mouth every day. 90 Tab 0   • doxycycline (VIBRAMYCIN) 100 MG Tab TAKE 1 TAB BY MOUTH 2 TIMES A DAY. 180 Tab 3   • Melatonin 10 MG Tab Take  by mouth every day.       No current facility-administered medications for this visit.        Review Of Systems:    Constitutional - Negative for fatigue  Respiratory - Negative for shortness of breath, cough  CVS - Negative for chest pain, palpitations  GI - Negative for nausea, vomiting, abdominal pain, diarrhea, constipation  Musculoskeletal - Positive for back pain  Neurological - Negative for headaches  Skin - Positive for acne  Psychiatric - Negative for depression. Positive for occasional anxiety    Physical Examination:  Vital signs: /76   Pulse 70   Ht 1.651 m (5' 5\")   Wt 88.9 kg (196 lb)   BMI 32.62 kg/m²     Musculoskeletal: Normal gait. No abnormal movements.     Mental Status Evaluation:   General: Young male with right forearm deformity, dressed in casual attire, good grooming and hygiene, in no apparent distress, calm and cooperative, good eye contact, psychomotor agitation - fidgety   Orientation: Alert and oriented to person, place and time  Recent and remote memory: Grossly intact  Attention span and concentration: Grossly intact  Speech: Spontaneous, normal rate, rhythm and tone  Thought Process: Linear, logical and goal directed  Thought Content: Denies suicidal or homicidal ideations, intent or plan  Perception: Denies auditory or visual hallucinations. No delusions noted  Associations: Intact  Language: Appropriate  Fund of knowledge and vocabulary: Grossly adequate  Mood: \"am doing well\"  Affect: Anxious at times, mood congruent  Insight: Good  Judgment: Good    Depression screening:  Depression Screen (PHQ-2/PHQ-9) 3/28/2016 6/7/2017 4/16/2018   PHQ-2 Total " Score 0 - -   PHQ-2 Total Score - 0 0     Interpretation of PHQ-9 Total Score   Score Severity   1-4 No Depression   5-9 Mild Depression   10-14 Moderate Depression   15-19 Moderately Severe Depression   20-27 Severe Depression    Medical Records/Labs/Diagnostic Tests Reviewed:  [place]  records - no prescribed controlled medications found in the last 1 year      Impression:  1. Generalized anxiety disorder - stable  2. Social anxiety disorder - stable  3. Unspecified depressive disorder - stable    Plan:  1.  Discontinue Wellbutrin as he feels it is contributing to his anxiety symptoms  3.  Increase Zoloft to 100 mg and switch timing to bedtime for anxiety and depression  3.  Continue individual psychotherapy with Dr. Jordin Chavez, Ed.D    Return to clinic in 3 months or sooner if symptoms worsen    The proposed treatment plan was discussed with the patient who was provided the opportunity to ask questions and make suggestions regarding alternative treatment. Patient verbalized understanding and expressed agreement with the plan.     Leona Emerson M.D.  03/27/19    This note was created using voice recognition software (Dragon). The accuracy of the dictation is limited by the abilities of the software. I have reviewed the note prior to signing, however some errors in grammar and context are still possible. If you have any questions related to this note please do not hesitate to contact our office.

## 2019-04-03 DIAGNOSIS — L70.0 ACNE VULGARIS: ICD-10-CM

## 2019-04-03 RX ORDER — DOXYCYCLINE HYCLATE 100 MG
100 TABLET ORAL 2 TIMES DAILY
Qty: 180 TAB | Refills: 3 | OUTPATIENT
Start: 2019-04-03

## 2019-04-29 RX ORDER — BUPROPION HYDROCHLORIDE 150 MG/1
150 TABLET, EXTENDED RELEASE ORAL EVERY MORNING
Qty: 90 TAB | Refills: 0 | OUTPATIENT
Start: 2019-04-29

## 2019-07-31 ENCOUNTER — OFFICE VISIT (OUTPATIENT)
Dept: BEHAVIORAL HEALTH | Facility: CLINIC | Age: 19
End: 2019-07-31
Payer: COMMERCIAL

## 2019-07-31 DIAGNOSIS — F40.10 SOCIAL ANXIETY DISORDER: ICD-10-CM

## 2019-07-31 DIAGNOSIS — F41.1 GAD (GENERALIZED ANXIETY DISORDER): ICD-10-CM

## 2019-07-31 PROCEDURE — 90834 PSYTX W PT 45 MINUTES: CPT | Performed by: PSYCHOLOGIST

## 2019-07-31 NOTE — BH THERAPY
[facility]  Therapy Progress Note    Patient Name: Mike Miller  Patient MRN: [id number]  Today's Date: 7/31/2019    Type of session:Individual psychotherapy  Length of session: 50 minutes  Persons in attendance:Patient    Subjective/New Info: The patient ID/Chief Complaint:  The patient is a 19 year old male, single, .  The patient was referred by psychiatry and voluntarily presented for an individual intake to address anxiety symptoms including chronic worrying and mild to moderate social anxiety with some evidence of vocal tic related to anxiety.  Reviewed limits of confidentiality and [facility] policies; patient expressed understanding and agreed to voluntarily proceed with evaluation and treatment.     Interval History:   Session Focus: The patient's estimated global assessment of functioning suggests a moderate degree of emotional distress and difficulty dealing with relationships, work and/or school life. The patient's affective and emotional state appeared mildy anxious.  Patient return to psychotherapy after last being seen in March 2019.  Patient reports that he has continued his psychiatry appointments and recently his sertraline was increased to 100 mg he does report some improvement in anxiety symptoms.  Began the process of exploring his family of origin for schemas associated with his social anxiety.  Also discussed with the patient the benefits of doing psychological testing for which the patient agreed and will be completing that testing today.  The results of the testing will be unavailable as we use a mail-out service but will be discussed at the next session.  The primary purpose for the psychological testing is diagnostic clarification and to rule out the possibility of an underlying personality disorder particularly schizoid or avoidant personality disorder.    Therapeutic Intervention: Cognitive Behavioral Therapy      Planned Intervention:  examining automatic thought and using thought records    Objective/Observations:      Patient did not present in acute distress. Patient was appropriately groomed. Patient was alert and oriented x4. Eye contact was appropriate. No abnormalities in attention or concentration were noted. No abnormalities of movement present; psychomotor activity was normal. Speech was fluent and regular in rhythm, rate, volume, and tone. Thought processes linear, logical and goal directed. There was no evidence of thought disorder. No auditory or visual hallucinations. Long and short term memory appeared to be intact. Insight, judgment, and impulse control were deemed to be good.  Reported mood was “anxious.” Affect was full-ranging and appropriate to thought content and conversation.  Patient denied past and current suicidal and homicidal ideation in plan, intent, and preparatory behavior.         Diagnoses:   1. Social anxiety disorder    2. IRMA (generalized anxiety disorder)       The patient denied any suicide-related ideation and/or behaviors and intent/plan, denied thoughts about death and dying both in session and during the period since last appointment, or past 2 weeks.    Risk Level: Not Currently at Clinically Significant Risk  Hospitalization is not deemed necessary at this time as the patient does not present a clear or imminent danger to self or others. No indication for pursuing higher level of care. Outpatient management is currently most appropriate and least restrictive level of care.  Current risk:   SUICIDE: Low   Homicide: Not applicable   Self-harm: Not applicable   Relapse: Not applicable   Other:    Safety Plan reviewed? Not Indicated   If evidence of imminent risk is present, intervention/plan:         Treatment Goal(s)/Objective(s) addressed:    Anxiety   Goal: Develop strategies to reduce symptoms, or   Reduce anxiety and improve coping skills   Report feeling more positive about self and abilities during  therapy sessions   Develop strategies for thought distraction when fixating on the future     Progress toward Treatment Goals: Mild improvement    Plan:    1) The patient will return to the clinic 3-4 weeks.  2) Crisis Response Plan:  Reviewed emergency resources with the patient and the patient expressed understanding including:  If feeling suicidal, patient will call or present to the [facility] during duty hours or present to closest ED ([facility] or [facility], call 911 or crisis hotline ([phone]) after duty hours.  3) Referrals/Consults:  N/A  4) Barriers to Learning:  No  5) Readiness to Learn:  Yes  6) Cultural Concerns:  No  7) Patient voiced understanding of, and agreement with, plan and goals as annotated above.  8) Declare these services are medically necessary and appropriate to the patient’s diagnosis and needs  9) The point of contact at the [facility] regarding this evaluation is Dr. Chavez, Psychologist.      Jordin Chavez III, Ed.D.  7/31/2019

## 2019-08-21 ENCOUNTER — OFFICE VISIT (OUTPATIENT)
Dept: BEHAVIORAL HEALTH | Facility: CLINIC | Age: 19
End: 2019-08-21
Payer: COMMERCIAL

## 2019-08-21 DIAGNOSIS — F40.10 SOCIAL ANXIETY DISORDER: ICD-10-CM

## 2019-08-21 DIAGNOSIS — F41.1 GAD (GENERALIZED ANXIETY DISORDER): ICD-10-CM

## 2019-08-21 PROCEDURE — 90834 PSYTX W PT 45 MINUTES: CPT | Performed by: PSYCHOLOGIST

## 2019-08-22 NOTE — BH THERAPY
[facility]  Therapy Progress Note    Patient Name: Mike Miller  Patient MRN: [id number]  Today's Date: 8/21/2019    Type of session:Individual psychotherapy  Length of session: 50 minutes  Persons in attendance:Patient    Subjective/New Info: The patient ID/Chief Complaint:  The patient is a 19 year old male, single, .  The patient was referred by psychiatry and voluntarily presented for an individual intake to address anxiety symptoms including chronic worrying and mild to moderate social anxiety with some evidence of vocal tic related to anxiety.  Reviewed limits of confidentiality and [facility] policies; patient expressed understanding and agreed to voluntarily proceed with evaluation and treatment.     Interval History:   Session Focus: The patient's estimated global assessment of functioning suggests a moderate degree of emotional distress and difficulty dealing with relationships, work and/or school life. The patient's affective and emotional state appeared mildy anxious.  Review of the results of psychological testing with the patient indicating that the best indications were that it was not schizoid but more avoidant types.  Also discussed how all of avoidant interacted with his social anxiety.  Patient discussed some concerns that he has related to his current interactions that he frequently uses a lot of façades in order to interact with others even though they have may not be the type of individuals he would choose to interact with.  He also indicates that he continues to have a fairly limited close friend network with some desire to engage in some type of romantic relationship.  Discussed the use of problem solving to address some of his concerns related to social interactions and a desire for romantic relationships.    Therapeutic Intervention: Cognitive Behavioral Therapy      Planned Intervention: examining automatic thought and using thought  records    Objective/Observations:      Patient did not present in acute distress. Patient was appropriately groomed. Patient was alert and oriented x4. Eye contact was appropriate. No abnormalities in attention or concentration were noted. No abnormalities of movement present; psychomotor activity was normal. Speech was fluent and regular in rhythm, rate, volume, and tone. Thought processes linear, logical and goal directed. There was no evidence of thought disorder. No auditory or visual hallucinations. Long and short term memory appeared to be intact. Insight, judgment, and impulse control were deemed to be good.  Reported mood was “anxious.” Affect was full-ranging and appropriate to thought content and conversation.  Patient denied past and current suicidal and homicidal ideation in plan, intent, and preparatory behavior.         Diagnoses:   1. Social anxiety disorder    2. IRMA (generalized anxiety disorder)       The patient denied any suicide-related ideation and/or behaviors and intent/plan, denied thoughts about death and dying both in session and during the period since last appointment, or past 2 weeks.    Risk Level: Not Currently at Clinically Significant Risk  Hospitalization is not deemed necessary at this time as the patient does not present a clear or imminent danger to self or others. No indication for pursuing higher level of care. Outpatient management is currently most appropriate and least restrictive level of care.  Current risk:   SUICIDE: Low   Homicide: Not applicable   Self-harm: Not applicable   Relapse: Not applicable   Other:    Safety Plan reviewed? Not Indicated   If evidence of imminent risk is present, intervention/plan:         Treatment Goal(s)/Objective(s) addressed:    Anxiety   Goal: Develop strategies to reduce symptoms, or   Reduce anxiety and improve coping skills   Report feeling more positive about self and abilities during therapy sessions   Develop strategies for  thought distraction when fixating on the future     Progress toward Treatment Goals: Moderate improvement    Plan:    1) The patient will return to the clinic 3-4 weeks.  2) Crisis Response Plan:  Reviewed emergency resources with the patient and the patient expressed understanding including:  If feeling suicidal, patient will call or present to the [facility] during duty hours or present to closest ED ([facility] or [facility], call 911 or crisis hotline ([phone]-TFVI) after duty hours.  3) Referrals/Consults:  N/A  4) Barriers to Learning:  No  5) Readiness to Learn:  Yes  6) Cultural Concerns:  No  7) Patient voiced understanding of, and agreement with, plan and goals as annotated above.  8) Declare these services are medically necessary and appropriate to the patient’s diagnosis and needs  9) The point of contact at the [facility] regarding this evaluation is Dr. Chavez, Psychologist.      Jordin Chavez III Ed.SUYAPA.  8/21/2019

## 2019-09-17 ENCOUNTER — APPOINTMENT (OUTPATIENT)
Dept: BEHAVIORAL HEALTH | Facility: CLINIC | Age: 19
End: 2019-09-17
Payer: COMMERCIAL

## 2019-10-09 ENCOUNTER — OFFICE VISIT (OUTPATIENT)
Dept: BEHAVIORAL HEALTH | Facility: CLINIC | Age: 19
End: 2019-10-09
Payer: COMMERCIAL

## 2019-10-09 ENCOUNTER — OFFICE VISIT (OUTPATIENT)
Dept: MEDICAL GROUP | Facility: MEDICAL CENTER | Age: 19
End: 2019-10-09
Payer: COMMERCIAL

## 2019-10-09 VITALS
WEIGHT: 186 LBS | BODY MASS INDEX: 30.99 KG/M2 | HEART RATE: 86 BPM | DIASTOLIC BLOOD PRESSURE: 90 MMHG | SYSTOLIC BLOOD PRESSURE: 130 MMHG | HEIGHT: 65 IN

## 2019-10-09 VITALS
DIASTOLIC BLOOD PRESSURE: 72 MMHG | OXYGEN SATURATION: 97 % | TEMPERATURE: 97.4 F | RESPIRATION RATE: 16 BRPM | WEIGHT: 187 LBS | BODY MASS INDEX: 31.16 KG/M2 | HEART RATE: 68 BPM | SYSTOLIC BLOOD PRESSURE: 112 MMHG | HEIGHT: 65 IN

## 2019-10-09 DIAGNOSIS — Z23 INFLUENZA VACCINE NEEDED: ICD-10-CM

## 2019-10-09 DIAGNOSIS — F41.1 GAD (GENERALIZED ANXIETY DISORDER): ICD-10-CM

## 2019-10-09 DIAGNOSIS — R53.83 FATIGUE, UNSPECIFIED TYPE: ICD-10-CM

## 2019-10-09 DIAGNOSIS — F32.A DEPRESSIVE DISORDER: ICD-10-CM

## 2019-10-09 DIAGNOSIS — K21.9 GASTROESOPHAGEAL REFLUX DISEASE WITHOUT ESOPHAGITIS: ICD-10-CM

## 2019-10-09 DIAGNOSIS — G47.9 SLEEP DISORDER: ICD-10-CM

## 2019-10-09 DIAGNOSIS — F40.10 SOCIAL ANXIETY DISORDER: ICD-10-CM

## 2019-10-09 DIAGNOSIS — Z00.00 ROUTINE GENERAL MEDICAL EXAMINATION AT A HEALTH CARE FACILITY: ICD-10-CM

## 2019-10-09 PROCEDURE — 99214 OFFICE O/P EST MOD 30 MIN: CPT | Mod: 25 | Performed by: NURSE PRACTITIONER

## 2019-10-09 PROCEDURE — 90471 IMMUNIZATION ADMIN: CPT | Performed by: NURSE PRACTITIONER

## 2019-10-09 PROCEDURE — 90686 IIV4 VACC NO PRSV 0.5 ML IM: CPT | Performed by: NURSE PRACTITIONER

## 2019-10-09 PROCEDURE — 99214 OFFICE O/P EST MOD 30 MIN: CPT | Performed by: PSYCHIATRY & NEUROLOGY

## 2019-10-09 RX ORDER — SERTRALINE HYDROCHLORIDE 100 MG/1
100 TABLET, FILM COATED ORAL DAILY
Qty: 90 TAB | Refills: 1 | Status: SHIPPED | OUTPATIENT
Start: 2019-10-09 | End: 2020-02-10 | Stop reason: SDUPTHER

## 2019-10-09 RX ORDER — PANTOPRAZOLE SODIUM 40 MG/1
40 TABLET, DELAYED RELEASE ORAL DAILY
Qty: 30 TAB | Refills: 0 | Status: SHIPPED | OUTPATIENT
Start: 2019-10-09 | End: 2019-11-04 | Stop reason: SDUPTHER

## 2019-10-09 ASSESSMENT — PATIENT HEALTH QUESTIONNAIRE - PHQ9: CLINICAL INTERPRETATION OF PHQ2 SCORE: 0

## 2019-10-09 NOTE — PROGRESS NOTES
"PSYCHIATRY FOLLOW-UP NOTE      Chief Complaint   Patient presents with   • Follow-Up     anxiety, depression         History Of Present Illness:  Mike Miller is a 19 y.o. old male with depressive disorder, generalized anxiety disorder, social anxiety disorder comes in today for follow up, was last seen over 4 months ago.  He has been doing good in regards to his depression and anxiety.  He did notice an improvement with the higher dose of Zoloft.  However, he has been struggling with significant daytime fatigue and excessive sleeping at bedtime which has been bothering him a lot.  He is schedule an appointment to see his PCP today for further evaluation.  He is denying any significant anxiety or depressive symptoms that could be adding to his fatigue or excessive sleeping.  Denies struggling with anger or irritability.  He denies anhedonia.  He continues to work at both his jobs and is working approximately 50 hours and does not think that working too much is making him fatigued.  He has some anxiety when his dog passed away but is feeling better now.  He denies any panic attacks.  He is no longer using any sleeping medication or cannabis at bedtime.  He denies having thoughts of wanting to hurt himself or others.    Social History:   He is currently single, has never been  and has no kids. He lives with his parents in [place]. 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    Substance Use:  Alcohol - Infrequent alcohol use   Nicotine - Denies  Illicit drugs - Denies, stopped using cannabis    Past Medication Trials:  Wellbutrin  mg (s/e - \"more anxious\")    Medications:  Current Outpatient Medications   Medication Sig Dispense Refill   • multivitamin (THERAGRAN) Tab Take 1 Tab by mouth every day.     • sertraline (ZOLOFT) 100 MG Tab Take 1 Tab by mouth every day. 90 Tab 1   • doxycycline (VIBRAMYCIN) 100 " "MG Tab TAKE 1 TAB BY MOUTH 2 TIMES A DAY. (Patient not taking: Reported on 10/9/2019) 180 Tab 3     No current facility-administered medications for this visit.        Review Of Systems:    Constitutional - Positive for fatigue  Respiratory - Negative for shortness of breath, cough  CVS - Negative for chest pain, palpitations  GI - Negative for nausea, vomiting, abdominal pain, diarrhea, constipation  Musculoskeletal - Positive for back pain  Neurological - Negative for headaches  Skin - Positive for acne  Psychiatric - Negative for depression, anxiety.  Positive for excessive sleep    Physical Examination:  Vital signs: /90   Pulse 86   Ht 1.651 m (5' 5\")   Wt 84.4 kg (186 lb)   BMI 30.95 kg/m²     Musculoskeletal: Normal gait. No abnormal movements.     Mental Status Evaluation:   General: Young male with right forearm deformity, dressed in casual attire, good grooming and hygiene, in no apparent distress, calm and cooperative, good eye contact, no psychomotor agitation or retardation   Orientation: Alert and oriented to person, place and time  Recent and remote memory: Grossly intact  Attention span and concentration: Grossly intact  Speech: Spontaneous, normal rate, rhythm and tone  Thought Process: Linear, logical and goal directed  Thought Content: Denies suicidal or homicidal ideations, intent or plan  Perception: Denies auditory or visual hallucinations. No delusions noted  Associations: Intact  Language: Appropriate  Fund of knowledge and vocabulary: Grossly adequate  Mood: \"goodl\"  Affect: Euthymic, mood congruent  Insight: Good  Judgment: Good    Depression screening:  Depression Screen (PHQ-2/PHQ-9) 4/16/2018 3/27/2019 10/9/2019   PHQ-2 Total Score - - -   PHQ-2 Total Score 0 0 0     Interpretation of PHQ-9 Total Score   Score Severity   1-4 No Depression   5-9 Mild Depression   10-14 Moderate Depression   15-19 Moderately Severe Depression   20-27 Severe Depression    Medical " Records/Labs/Diagnostic Tests Reviewed:  [place]  records - no prescribed controlled medications found in the last 1 year      Impression:  1. Generalized anxiety disorder - stable  2. Social anxiety disorder - stable  3. Unspecified depressive disorder - stable    Plan:  1.  Continue Zoloft 100 mg at bedtime for anxiety and depression  2.  Continue individual psychotherapy with Dr. Jordin Chavez, [place].D    Return to clinic in 4 months or sooner if symptoms worsen    The proposed treatment plan was discussed with the patient who was provided the opportunity to ask questions and make suggestions regarding alternative treatment. Patient verbalized understanding and expressed agreement with the plan.     Leona Emerson M.D.  10/09/19    This note was created using voice recognition software (Dragon). The accuracy of the dictation is limited by the abilities of the software. I have reviewed the note prior to signing, however some errors in grammar and context are still possible. If you have any questions related to this note please do not hesitate to contact our office.

## 2019-10-09 NOTE — PROGRESS NOTES
"Subjective:      Mike Miller is a 19 y.o. male who presents with Fatigue        CC: Patient here today for complaints of fatigue and hypersomnia.  He also has issues with acid reflux.    HPI       1. Fatigue, unspecified type  Patient reports that for most of his life he has been having issues with fatigue.  He states he will be drowsy during the day and has trouble waking up in the morning.  He does not think it is depression related which she is being treated for.  Over the past 4 weeks his symptoms have worsened.  He feels as if he has no energy.    2. Sleep disorder  Patient states he does not have sleep apnea symptoms such as loud snoring or gasping for air but he states lately he has been having trouble waking up in the morning.  He states he will often sleep through his alarm and will sleep for 10 hours/day.  He states he does not wake up during the night and normally gets 6 to 8 hours sleep per night.    3. Gastroesophageal reflux disease without esophagitis  Patient states he been having some issues with acid reflux although he does not think this is contributing to his sleep issues.  He tried some Prilosec for short course but that did not help.    4. Routine general medical examination at a health care facility  Patient due for lab work.    5. Influenza vaccine needed  Patient due for vaccine  Past Medical History:   Diagnosis Date   • Anxiety    • Asthma in remission    • Cold 12/21/2017    \"I had a head cold about 2 weeks ago\"  denies SOB, productive cough   • Panic disorder    • Psychiatric problem 12/2017    depression   • Tic disorder      Social History     Socioeconomic History   • Marital status: Single     Spouse name: Not on file   • Number of children: Not on file   • Years of education: Not on file   • Highest education level: Not on file   Occupational History   • Not on file   Social Needs   • Financial resource strain: Not on file   • Food insecurity:     Worry: Not on file     " Inability: Not on file   • Transportation needs:     Medical: Not on file     Non-medical: Not on file   Tobacco Use   • Smoking status: Former Smoker   • Smokeless tobacco: Never Used   Substance and Sexual Activity   • Alcohol use: Yes     Alcohol/week: 0.0 oz     Comment: infrequent   • Drug use: Not Currently     Frequency: 7.0 times per week     Comment: stopping vaping cannabis   • Sexual activity: Not Currently     Partners: Female   Lifestyle   • Physical activity:     Days per week: Not on file     Minutes per session: Not on file   • Stress: Not on file   Relationships   • Social connections:     Talks on phone: Not on file     Gets together: Not on file     Attends [employer] service: Not on file     Active member of club or organization: Not on file     Attends meetings of clubs or organizations: Not on file     Relationship status: Not on file   • Intimate partner violence:     Fear of current or ex partner: Not on file     Emotionally abused: Not on file     Physically abused: Not on file     Forced sexual activity: Not on file   Other Topics Concern   • Behavioral problems Not Asked   • Interpersonal relationships Not Asked   • Sad or not enjoying activities Not Asked   • Suicidal thoughts Not Asked   • Poor school performance Not Asked   • Reading difficulties Not Asked   • Speech difficulties Not Asked   • Writing difficulties Not Asked   • Inadequate sleep Not Asked   • Excessive TV viewing Not Asked   • Excessive video game use Not Asked   • Inadequate exercise Not Asked   • Sports related Not Asked   • Poor diet Not Asked   • Family concerns for drug/alcohol abuse Not Asked   • Poor oral hygiene Not Asked   • Bike safety Not Asked   • Family concerns vehicle safety Not Asked   Social History Narrative   • Not on file     Current Outpatient Medications   Medication Sig Dispense Refill   • multivitamin (THERAGRAN) Tab Take 1 Tab by mouth every day.     • sertraline (ZOLOFT) 100 MG Tab Take 1 Tab by  "mouth every day. 90 Tab 1   • pantoprazole (PROTONIX) 40 MG Tablet Delayed Response Take 1 Tab by mouth every day. 30 Tab 0   • doxycycline (VIBRAMYCIN) 100 MG Tab TAKE 1 TAB BY MOUTH 2 TIMES A DAY. 180 Tab 3     No current facility-administered medications for this visit.      Family History   Problem Relation Age of Onset   • Hypertension Father    • Heart Disease Father         s/p CABG   • Depression Father    • Other Father         Spinal stenosis   • Depression Mother         on Wellbutrin         Review of Systems   Constitutional: Positive for malaise/fatigue.   Psychiatric/Behavioral: Positive for depression.   All other systems reviewed and are negative.         Objective:     /72 (BP Location: Right arm, Patient Position: Sitting, BP Cuff Size: Adult)   Pulse 68   Temp 36.3 °C (97.4 °F) (Temporal)   Resp 16   Ht 1.651 m (5' 5\")   Wt 84.8 kg (187 lb)   SpO2 97%   BMI 31.12 kg/m²      Physical Exam   Constitutional: He is oriented to person, place, and time. He appears well-developed and well-nourished. No distress.   HENT:   Head: Normocephalic and atraumatic.   Right Ear: External ear normal.   Left Ear: External ear normal.   Nose: Nose normal.   Mouth/Throat: Oropharynx is clear and moist.   Eyes: Conjunctivae are normal. Right eye exhibits no discharge. Left eye exhibits no discharge.   Neck: Normal range of motion. Neck supple. No tracheal deviation present. No thyromegaly present.   Cardiovascular: Normal rate, regular rhythm and normal heart sounds.   No murmur heard.  Pulmonary/Chest: Effort normal and breath sounds normal. No respiratory distress. He has no wheezes. He has no rales.   Lymphadenopathy:     He has no cervical adenopathy.   Neurological: He is alert and oriented to person, place, and time. Coordination normal.   Skin: Skin is warm and dry. No rash noted. He is not diaphoretic. No erythema.   Psychiatric: He has a normal mood and affect. His behavior is normal. Judgment " and thought content normal.   Nursing note and vitals reviewed.              Assessment/Plan:     1. Fatigue, unspecified type  Patient presents with interesting situation of hypersomnia and fatigue but is not falling asleep during the day or waking up throughout the night.  This is been an ongoing problem for a while although it has worsened over the last few weeks.  This could be viral such as mono and I will check for this.  I will also look for underlying issues which may be contributing.  - Comp Metabolic Panel; Future  - CBC WITH DIFFERENTIAL; Future  - TSH; Future  - TESTOSTERONE F&T MALE ADULT; Future    2. Sleep disorder  I will refer patient to sleep center for his issues with hypersomnia and fatigue during the day.  - REFERRAL TO SLEEP STUDIES    3. Gastroesophageal reflux disease without esophagitis  I will have patient try Protonix for 3 to 4 weeks and then wean off it.  I told her if symptoms do not improve with treatment he will need to see GI.  The patient was instructed in the following lifestyle and dietary changes:    A. Avoid fried, fatty, and spicy foods until better.  B. Avoid alcohol and excessive caffeine intake.  C. Avoid citrus juices, tomato products, and pepper.  D.Eat smaller portions at mealtimes. Consider more frequent smaller meals.  E. Do not eat meals before bedtime.  F. Lose weight.  G. Quit smoking.  H. Avoid medicines like Advil, Aleve, or asperin. Chose Tylenol if possible.  I. Avoid tight clothing or belts.  J.Consider raising the head of the bed with 6 inch blocks.  - pantoprazole (PROTONIX) 40 MG Tablet Delayed Response; Take 1 Tab by mouth every day.  Dispense: 30 Tab; Refill: 0    4. Routine general medical examination at a health care facility    - Comp Metabolic Panel; Future  - CBC WITH DIFFERENTIAL; Future  - TSH; Future    5. Influenza vaccine needed  I have placed the below orders and discussed them with an approved delegating provider. The MA is performing the below  orders under the direction of Dr. Stone    - Influenza Vaccine Quad Injection (PF)

## 2019-10-10 ENCOUNTER — HOSPITAL ENCOUNTER (OUTPATIENT)
Dept: LAB | Facility: MEDICAL CENTER | Age: 19
End: 2019-10-10
Attending: NURSE PRACTITIONER
Payer: COMMERCIAL

## 2019-10-10 DIAGNOSIS — R53.83 FATIGUE, UNSPECIFIED TYPE: ICD-10-CM

## 2019-10-10 DIAGNOSIS — Z00.00 ROUTINE GENERAL MEDICAL EXAMINATION AT A HEALTH CARE FACILITY: ICD-10-CM

## 2019-10-10 LAB
ALBUMIN SERPL BCP-MCNC: 4.5 G/DL (ref 3.2–4.9)
ALBUMIN/GLOB SERPL: 1.7 G/DL
ALP SERPL-CCNC: 61 U/L (ref 30–99)
ALT SERPL-CCNC: 11 U/L (ref 2–50)
ANION GAP SERPL CALC-SCNC: 6 MMOL/L (ref 0–11.9)
AST SERPL-CCNC: 17 U/L (ref 12–45)
BASOPHILS # BLD AUTO: 0.5 % (ref 0–1.8)
BASOPHILS # BLD: 0.03 K/UL (ref 0–0.12)
BILIRUB SERPL-MCNC: 0.6 MG/DL (ref 0.1–1.5)
BUN SERPL-MCNC: 16 MG/DL (ref 8–22)
CALCIUM SERPL-MCNC: 9.5 MG/DL (ref 8.5–10.5)
CHLORIDE SERPL-SCNC: 108 MMOL/L (ref 96–112)
CO2 SERPL-SCNC: 27 MMOL/L (ref 20–33)
CREAT SERPL-MCNC: 1.15 MG/DL (ref 0.5–1.4)
EOSINOPHIL # BLD AUTO: 0.06 K/UL (ref 0–0.51)
EOSINOPHIL NFR BLD: 0.9 % (ref 0–6.9)
ERYTHROCYTE [DISTWIDTH] IN BLOOD BY AUTOMATED COUNT: 42.9 FL (ref 35.9–50)
GLOBULIN SER CALC-MCNC: 2.6 G/DL (ref 1.9–3.5)
GLUCOSE SERPL-MCNC: 84 MG/DL (ref 65–99)
HCT VFR BLD AUTO: 47.8 % (ref 42–52)
HETEROPH AB SER QL: NEGATIVE
HGB BLD-MCNC: 16.1 G/DL (ref 14–18)
IMM GRANULOCYTES # BLD AUTO: 0.01 K/UL (ref 0–0.11)
IMM GRANULOCYTES NFR BLD AUTO: 0.2 % (ref 0–0.9)
LYMPHOCYTES # BLD AUTO: 1.18 K/UL (ref 1–4.8)
LYMPHOCYTES NFR BLD: 18.4 % (ref 22–41)
MCH RBC QN AUTO: 29.3 PG (ref 27–33)
MCHC RBC AUTO-ENTMCNC: 33.7 G/DL (ref 33.7–35.3)
MCV RBC AUTO: 86.9 FL (ref 81.4–97.8)
MONOCYTES # BLD AUTO: 0.58 K/UL (ref 0–0.85)
MONOCYTES NFR BLD AUTO: 9 % (ref 0–13.4)
NEUTROPHILS # BLD AUTO: 4.57 K/UL (ref 1.82–7.42)
NEUTROPHILS NFR BLD: 71 % (ref 44–72)
NRBC # BLD AUTO: 0 K/UL
NRBC BLD-RTO: 0 /100 WBC
PLATELET # BLD AUTO: 221 K/UL (ref 164–446)
PMV BLD AUTO: 9.7 FL (ref 9–12.9)
POTASSIUM SERPL-SCNC: 4.2 MMOL/L (ref 3.6–5.5)
PROT SERPL-MCNC: 7.1 G/DL (ref 6–8.2)
RBC # BLD AUTO: 5.5 M/UL (ref 4.7–6.1)
SODIUM SERPL-SCNC: 141 MMOL/L (ref 135–145)
TSH SERPL DL<=0.005 MIU/L-ACNC: 1.4 UIU/ML (ref 0.38–5.33)
WBC # BLD AUTO: 6.4 K/UL (ref 4.8–10.8)

## 2019-10-10 PROCEDURE — 86308 HETEROPHILE ANTIBODY SCREEN: CPT

## 2019-10-10 PROCEDURE — 85025 COMPLETE CBC W/AUTO DIFF WBC: CPT

## 2019-10-10 PROCEDURE — 84403 ASSAY OF TOTAL TESTOSTERONE: CPT

## 2019-10-10 PROCEDURE — 80053 COMPREHEN METABOLIC PANEL: CPT

## 2019-10-10 PROCEDURE — 84270 ASSAY OF SEX HORMONE GLOBUL: CPT

## 2019-10-10 PROCEDURE — 84443 ASSAY THYROID STIM HORMONE: CPT

## 2019-10-10 PROCEDURE — 36415 COLL VENOUS BLD VENIPUNCTURE: CPT

## 2019-10-10 ASSESSMENT — ENCOUNTER SYMPTOMS: DEPRESSION: 1

## 2019-10-12 LAB
SHBG SERPL-SCNC: 20 NMOL/L (ref 11–80)
TESTOST FREE MFR SERPL: 2.4 % (ref 1.6–2.9)
TESTOST FREE SERPL-MCNC: 154 PG/ML (ref 47–244)
TESTOST SERPL-MCNC: 632 NG/DL (ref 300–1080)

## 2019-11-04 DIAGNOSIS — K21.9 GASTROESOPHAGEAL REFLUX DISEASE WITHOUT ESOPHAGITIS: ICD-10-CM

## 2019-11-04 RX ORDER — PANTOPRAZOLE SODIUM 40 MG/1
TABLET, DELAYED RELEASE ORAL
Qty: 30 TAB | Refills: 10 | Status: SHIPPED | OUTPATIENT
Start: 2019-11-04 | End: 2020-08-03 | Stop reason: SDUPTHER

## 2019-11-14 ENCOUNTER — OFFICE VISIT (OUTPATIENT)
Dept: BEHAVIORAL HEALTH | Facility: CLINIC | Age: 19
End: 2019-11-14
Payer: COMMERCIAL

## 2019-11-14 DIAGNOSIS — F41.1 GAD (GENERALIZED ANXIETY DISORDER): ICD-10-CM

## 2019-11-14 DIAGNOSIS — F40.10 SOCIAL ANXIETY DISORDER: ICD-10-CM

## 2019-11-14 PROCEDURE — 90834 PSYTX W PT 45 MINUTES: CPT | Performed by: PSYCHOLOGIST

## 2019-11-14 NOTE — BH THERAPY
[facility] Progress Note    Patient Name: Mike Miller  Patient MRN: [id number]  Today's Date: 11/14/2019    Type of session:Individual psychotherapy  Length of session: 45 minutes  Persons in attendance:Patient    Subjective/New Info: The patient ID/Chief Complaint:  The patient is a 19 year old male, single, .  The patient was referred by psychiatry and voluntarily presented for an individual intake to address anxiety symptoms including chronic worrying and mild to moderate social anxiety with some evidence of vocal tic related to anxiety.  Reviewed limits of confidentiality and [facility] policies; patient expressed understanding and agreed to voluntarily proceed with evaluation and treatment.     Interval History:   Session Focus: The patient's estimated global assessment of functioning suggests a moderate degree of emotional distress and difficulty dealing with relationships, work and/or school life. The patient's affective and emotional state appeared mildy anxious.  The patient is starting to recognize how his physical disability may be negatively impacting romantic relationships.  The patient is also interested in understanding how his personality may not be a personality disorder but unique to him.  The patient was able to identify a number of automatic thoughts associated with relationships and he was introduced to schemas.    Therapeutic Intervention: Cognitive Behavioral Therapy      Planned Intervention: examining automatic thought and using thought records    Objective/Observations:      Patient did not present in acute distress. Patient was appropriately groomed. Patient was alert and oriented x4. Eye contact was appropriate. No abnormalities in attention or concentration were noted. No abnormalities of movement present; psychomotor activity was normal. Speech was fluent and regular in rhythm, rate, volume, and tone. Thought processes linear,  logical and goal directed. There was no evidence of thought disorder. No auditory or visual hallucinations. Long and short term memory appeared to be intact. Insight, judgment, and impulse control were deemed to be good.  Reported mood was “anxious.” Affect was full-ranging and appropriate to thought content and conversation.  Patient denied past and current suicidal and homicidal ideation in plan, intent, and preparatory behavior.         Diagnoses:   1. Social anxiety disorder    2. IRMA (generalized anxiety disorder)       The patient denied any suicide-related ideation and/or behaviors and intent/plan, denied thoughts about death and dying both in session and during the period since last appointment, or past 2 weeks.    Risk Level: Not Currently at Clinically Significant Risk  Hospitalization is not deemed necessary at this time as the patient does not present a clear or imminent danger to self or others. No indication for pursuing higher level of care. Outpatient management is currently most appropriate and least restrictive level of care.  Current risk:   SUICIDE: Low   Homicide: Not applicable   Self-harm: Not applicable   Relapse: Not applicable   Other:    Safety Plan reviewed? Not Indicated   If evidence of imminent risk is present, intervention/plan:         Treatment Goal(s)/Objective(s) addressed:    Anxiety   Goal: Develop strategies to reduce symptoms, or   Reduce anxiety and improve coping skills   Report feeling more positive about self and abilities during therapy sessions   Develop strategies for thought distraction when fixating on the future     Progress toward Treatment Goals: Mild improvement    Plan:    1) The patient will return to the clinic 3-4 weeks.  2) Crisis Response Plan:  Reviewed emergency resources with the patient and the patient expressed understanding including:  If feeling suicidal, patient will call or present to the [facility] during duty hours or present to  closest ED ([facility] or [facility], call 911 or crisis hotline (9-009-632-SOGN) after duty hours.  3) Referrals/Consults:  N/A  4) Barriers to Learning:  No  5) Readiness to Learn:  Yes  6) Cultural Concerns:  No  7) Patient voiced understanding of, and agreement with, plan and goals as annotated above.  8) Declare these services are medically necessary and appropriate to the patient’s diagnosis and needs  9) The point of contact at the [facility] regarding this evaluation is Dr. Chavez, Psychologist.      Jordin Chavez III, Ed.D.  11/14/2019

## 2019-12-17 ENCOUNTER — OFFICE VISIT (OUTPATIENT)
Dept: BEHAVIORAL HEALTH | Facility: CLINIC | Age: 19
End: 2019-12-17
Payer: COMMERCIAL

## 2019-12-17 DIAGNOSIS — F41.1 GAD (GENERALIZED ANXIETY DISORDER): ICD-10-CM

## 2019-12-17 DIAGNOSIS — F40.10 SOCIAL ANXIETY DISORDER: ICD-10-CM

## 2019-12-17 PROCEDURE — 90834 PSYTX W PT 45 MINUTES: CPT | Performed by: PSYCHOLOGIST

## 2019-12-31 NOTE — BH THERAPY
[facility] Progress Note    Patient Name: Mike Miller  Patient MRN: [id number]  Today's Date: 12/17/2019    Type of session:Individual psychotherapy  Length of session: 45 minutes  Persons in attendance:Patient    Subjective/New Info: The patient ID/Chief Complaint:  The patient is a 19 year old male, single, .  The patient was referred by psychiatry and voluntarily presented for an individual intake to address anxiety symptoms including chronic worrying and mild to moderate social anxiety with some evidence of vocal tic related to anxiety.  Reviewed limits of confidentiality and [facility] policies; patient expressed understanding and agreed to voluntarily proceed with evaluation and treatment.     Interval History:   Session Focus: The patient's estimated global assessment of functioning suggests a moderate degree of emotional distress and difficulty dealing with relationships, work and/or school life.  Continue to explore schemas and automatic thoughts associated with decreased social interaction and anxiety in general.  The patient also wanted additional clarification related to the difference between a personality disorder and personality characteristics.    Therapeutic Intervention: Cognitive Behavioral Therapy      Planned Intervention: examining automatic thought and using thought records    Objective/Observations:      Patient did not present in acute distress. Patient was appropriately groomed. Patient was alert and oriented x4. Eye contact was appropriate. No abnormalities in attention or concentration were noted. No abnormalities of movement present; psychomotor activity was normal. Speech was fluent and regular in rhythm, rate, volume, and tone. Thought processes linear, logical and goal directed. There was no evidence of thought disorder. No auditory or visual hallucinations. Long and short term memory appeared to be intact. Insight, judgment,  and impulse control were deemed to be good.  Reported mood was “anxious.” Affect was full-ranging and appropriate to thought content and conversation.  Patient denied past and current suicidal and homicidal ideation in plan, intent, and preparatory behavior.         Diagnoses:   1. Social anxiety disorder    2. IRMA (generalized anxiety disorder)       The patient denied any suicide-related ideation and/or behaviors and intent/plan, denied thoughts about death and dying both in session and during the period since last appointment, or past 2 weeks.    Risk Level: Not Currently at Clinically Significant Risk  Hospitalization is not deemed necessary at this time as the patient does not present a clear or imminent danger to self or others. No indication for pursuing higher level of care. Outpatient management is currently most appropriate and least restrictive level of care.  Current risk:   SUICIDE: Low   Homicide: Not applicable   Self-harm: Not applicable   Relapse: Not applicable   Other:    Safety Plan reviewed? Not Indicated   If evidence of imminent risk is present, intervention/plan:         Treatment Goal(s)/Objective(s) addressed:    Anxiety   Goal: Develop strategies to reduce symptoms, or   Reduce anxiety and improve coping skills   Report feeling more positive about self and abilities during therapy sessions   Develop strategies for thought distraction when fixating on the future     Progress toward Treatment Goals: Mild improvement    Plan:    1) The patient will return to the clinic 3-4 weeks.  2) Crisis Response Plan:  Reviewed emergency resources with the patient and the patient expressed understanding including:  If feeling suicidal, patient will call or present to the [facility] during duty hours or present to closest ED ([facility] or [facility], call 911 or crisis hotline (0-252-488-CFXO) after duty hours.  3) Referrals/Consults:   N/A  4) Barriers to Learning:  No  5) Readiness to Learn:  Yes  6) Cultural Concerns:  No  7) Patient voiced understanding of, and agreement with, plan and goals as annotated above.  8) Declare these services are medically necessary and appropriate to the patient’s diagnosis and needs  9) The point of contact at the [facility] regarding this evaluation is Dr. Chavez, Psychologist.      Jordin Chavez III, Ed.D.  12/17/2019

## 2020-01-29 ENCOUNTER — OFFICE VISIT (OUTPATIENT)
Dept: BEHAVIORAL HEALTH | Facility: CLINIC | Age: 20
End: 2020-01-29
Payer: COMMERCIAL

## 2020-01-29 DIAGNOSIS — F41.1 GAD (GENERALIZED ANXIETY DISORDER): ICD-10-CM

## 2020-01-29 DIAGNOSIS — F40.10 SOCIAL ANXIETY DISORDER: ICD-10-CM

## 2020-01-29 PROCEDURE — 90834 PSYTX W PT 45 MINUTES: CPT | Performed by: PSYCHOLOGIST

## 2020-02-04 NOTE — BH THERAPY
[facility] Progress Note    Patient Name: Mike Miller  Patient MRN: [id number]  Today's Date: 01/29/2020    Type of session:Individual psychotherapy  Length of session: 38 minutes  Persons in attendance:Patient    Subjective/New Info: The patient ID/Chief Complaint:  The patient is a 19 year old male, single, .  The patient was referred by psychiatry and voluntarily presented for an individual intake to address anxiety symptoms including chronic worrying and mild to moderate social anxiety with some evidence of vocal tic related to anxiety.  Reviewed limits of confidentiality and [facility] policies; patient expressed understanding and agreed to voluntarily proceed with evaluation and treatment.     Interval History:   Session Focus: The patient's estimated global assessment of functioning suggests a moderate degree of emotional distress and difficulty dealing with relationships, work and/or school life.  Continue to explore with the patient the results of the psychological testing and how the psychological testing matches some schemas that he has even though he did not necessarily completely provide a valid assessment.    Therapeutic Intervention: Cognitive Behavioral Therapy      Planned Intervention: examining automatic thought and using thought records    Objective/Observations:      Patient did not present in acute distress. Patient was appropriately groomed. Patient was alert and oriented x4. Eye contact was appropriate. No abnormalities in attention or concentration were noted. No abnormalities of movement present; psychomotor activity was normal. Speech was fluent and regular in rhythm, rate, volume, and tone. Thought processes linear, logical and goal directed. There was no evidence of thought disorder. No auditory or visual hallucinations. Long and short term memory appeared to be intact. Insight, judgment, and impulse control were deemed to be  good.  Reported mood was “comfortable.” Affect was full-ranging and appropriate to thought content and conversation.  Patient denied past and current suicidal and homicidal ideation in plan, intent, and preparatory behavior.         Diagnoses:   1. IRMA (generalized anxiety disorder)    2. Social anxiety disorder       The patient denied any suicide-related ideation and/or behaviors and intent/plan, denied thoughts about death and dying both in session and during the period since last appointment, or past 2 weeks.    Risk Level: Not Currently at Clinically Significant Risk  Hospitalization is not deemed necessary at this time as the patient does not present a clear or imminent danger to self or others. No indication for pursuing higher level of care. Outpatient management is currently most appropriate and least restrictive level of care.  Current risk:   SUICIDE: Low   Homicide: Not applicable   Self-harm: Not applicable   Relapse: Not applicable   Other:    Safety Plan reviewed? Not Indicated   If evidence of imminent risk is present, intervention/plan:         Treatment Goal(s)/Objective(s) addressed:    Anxiety   Goal: Develop strategies to reduce symptoms, or   Reduce anxiety and improve coping skills   Report feeling more positive about self and abilities during therapy sessions   Develop strategies for thought distraction when fixating on the future     Progress toward Treatment Goals: Mild improvement    Plan:    1) The patient will return to the clinic 3-4 weeks.  2) Crisis Response Plan:  Reviewed emergency resources with the patient and the patient expressed understanding including:  If feeling suicidal, patient will call or present to the [facility] during duty hours or present to closest ED ([facility] or [facility], call 911 or crisis hotline ([phone]-VGQD) after duty hours.  3) Referrals/Consults:  N/A  4) Barriers to Learning:   No  5) Readiness to Learn:  Yes  6) Cultural Concerns:  No  7) Patient voiced understanding of, and agreement with, plan and goals as annotated above.  8) Declare these services are medically necessary and appropriate to the patient’s diagnosis and needs  9) The point of contact at the [facility] regarding this evaluation is Dr. Chavez, Psychologist.      Jordin Chavez III, Ed.D.

## 2020-02-05 ENCOUNTER — OFFICE VISIT (OUTPATIENT)
Dept: BEHAVIORAL HEALTH | Facility: CLINIC | Age: 20
End: 2020-02-05
Payer: COMMERCIAL

## 2020-02-05 DIAGNOSIS — F40.10 SOCIAL ANXIETY DISORDER: ICD-10-CM

## 2020-02-05 PROCEDURE — 90834 PSYTX W PT 45 MINUTES: CPT | Performed by: PSYCHOLOGIST

## 2020-02-07 NOTE — BH THERAPY
[facility] Progress Note    Patient Name: Mike Miller  Patient MRN: [id number]  Today's Date: 2/5/2020    Type of session:Individual psychotherapy  Length of session: 38 minutes  Persons in attendance:Patient    Subjective/New Info: The patient ID/Chief Complaint:  The patient is a 19 year old male, single, .  The patient was referred by psychiatry and voluntarily presented for an individual intake to address anxiety symptoms including chronic worrying and mild to moderate social anxiety with some evidence of vocal tic related to anxiety.  Reviewed limits of confidentiality and [facility] policies; patient expressed understanding and agreed to voluntarily proceed with evaluation and treatment.     Interval History:   Session Focus: The patient's estimated global assessment of functioning suggests a moderate degree of emotional distress and difficulty dealing with relationships, work and/or school life.  Starting the process of examining schemas associated with avoidance and social anxiety.    Therapeutic Intervention: Cognitive Behavioral Therapy      Planned Intervention: examining automatic thought and using thought records    Objective/Observations:      Patient did not present in acute distress. Patient was appropriately groomed. Patient was alert and oriented x4. Eye contact was appropriate. No abnormalities in attention or concentration were noted. No abnormalities of movement present; psychomotor activity was normal. Speech was fluent and regular in rhythm, rate, volume, and tone. Thought processes linear, logical and goal directed. There was no evidence of thought disorder. No auditory or visual hallucinations. Long and short term memory appeared to be intact. Insight, judgment, and impulse control were deemed to be good.  Reported mood was “comfortable.” Affect was full-ranging and appropriate to thought content and conversation.  Patient denied past  and current suicidal and homicidal ideation in plan, intent, and preparatory behavior.         Diagnoses:   1. Social anxiety disorder       The patient denied any suicide-related ideation and/or behaviors and intent/plan, denied thoughts about death and dying both in session and during the period since last appointment, or past 2 weeks.    Risk Level: Not Currently at Clinically Significant Risk  Hospitalization is not deemed necessary at this time as the patient does not present a clear or imminent danger to self or others. No indication for pursuing higher level of care. Outpatient management is currently most appropriate and least restrictive level of care.  Current risk:   SUICIDE: Low   Homicide: Not applicable   Self-harm: Not applicable   Relapse: Not applicable   Other:    Safety Plan reviewed? Not Indicated   If evidence of imminent risk is present, intervention/plan:         Treatment Goal(s)/Objective(s) addressed:    Anxiety   Goal: Develop strategies to reduce symptoms, or   Reduce anxiety and improve coping skills   Report feeling more positive about self and abilities during therapy sessions   Develop strategies for thought distraction when fixating on the future     Progress toward Treatment Goals: Mild improvement    Plan:    1) The patient will return to the clinic 3-4 weeks.  2) Crisis Response Plan:  Reviewed emergency resources with the patient and the patient expressed understanding including:  If feeling suicidal, patient will call or present to the [facility] during duty hours or present to closest ED ([facility] or [facility], call 911 or crisis hotline ([phone]-ZZUY) after duty hours.  3) Referrals/Consults:  N/A  4) Barriers to Learning:  No  5) Readiness to Learn:  Yes  6) Cultural Concerns:  No  7) Patient voiced understanding of, and agreement with, plan and goals as annotated above.  8) Declare these services are medically  necessary and appropriate to the patient’s diagnosis and needs  9) The point of contact at the [facility] regarding this evaluation is Dr. Chavez, Psychologist.      Jordin Chavez III, Ed.D.

## 2020-02-10 ENCOUNTER — OFFICE VISIT (OUTPATIENT)
Dept: BEHAVIORAL HEALTH | Facility: CLINIC | Age: 20
End: 2020-02-10
Payer: COMMERCIAL

## 2020-02-10 VITALS
HEIGHT: 65 IN | DIASTOLIC BLOOD PRESSURE: 78 MMHG | HEART RATE: 88 BPM | BODY MASS INDEX: 31.32 KG/M2 | WEIGHT: 188 LBS | SYSTOLIC BLOOD PRESSURE: 135 MMHG

## 2020-02-10 DIAGNOSIS — F41.1 GAD (GENERALIZED ANXIETY DISORDER): ICD-10-CM

## 2020-02-10 DIAGNOSIS — F40.10 SOCIAL ANXIETY DISORDER: ICD-10-CM

## 2020-02-10 DIAGNOSIS — F32.A DEPRESSIVE DISORDER: ICD-10-CM

## 2020-02-10 PROCEDURE — 99214 OFFICE O/P EST MOD 30 MIN: CPT | Performed by: PSYCHIATRY & NEUROLOGY

## 2020-02-10 RX ORDER — BUPROPION HYDROCHLORIDE 150 MG/1
150 TABLET, EXTENDED RELEASE ORAL EVERY MORNING
Qty: 90 TAB | Refills: 0 | Status: SHIPPED | OUTPATIENT
Start: 2020-02-10 | End: 2020-05-11

## 2020-02-10 RX ORDER — SERTRALINE HYDROCHLORIDE 100 MG/1
100 TABLET, FILM COATED ORAL
Qty: 90 TAB | Refills: 0 | Status: SHIPPED | OUTPATIENT
Start: 2020-02-10 | End: 2020-05-13 | Stop reason: SDUPTHER

## 2020-02-10 RX ORDER — BUPROPION HYDROCHLORIDE 150 MG/1
150 TABLET, EXTENDED RELEASE ORAL EVERY MORNING
COMMUNITY
End: 2020-02-10

## 2020-02-10 ASSESSMENT — PATIENT HEALTH QUESTIONNAIRE - PHQ9
9. THOUGHTS THAT YOU WOULD BE BETTER OFF DEAD, OR OF HURTING YOURSELF: NOT AT ALL
1. LITTLE INTEREST OR PLEASURE IN DOING THINGS: SEVERAL DAYS
6. FEELING BAD ABOUT YOURSELF - OR THAT YOU ARE A FAILURE OR HAVE LET YOURSELF OR YOUR FAMILY DOWN: MORE THAN HALF THE DAYS
4. FEELING TIRED OR HAVING LITTLE ENERGY: MORE THAN HALF THE DAYS
SUM OF ALL RESPONSES TO PHQ9 QUESTIONS 1 AND 2: 2
2. FEELING DOWN, DEPRESSED, IRRITABLE, OR HOPELESS: SEVERAL DAYS
3. TROUBLE FALLING OR STAYING ASLEEP OR SLEEPING TOO MUCH: MORE THAN HALF THE DAYS
7. TROUBLE CONCENTRATING ON THINGS, SUCH AS READING THE NEWSPAPER OR WATCHING TELEVISION: MORE THAN HALF THE DAYS
8. MOVING OR SPEAKING SO SLOWLY THAT OTHER PEOPLE COULD HAVE NOTICED. OR THE OPPOSITE, BEING SO FIGETY OR RESTLESS THAT YOU HAVE BEEN MOVING AROUND A LOT MORE THAN USUAL: SEVERAL DAYS
5. POOR APPETITE OR OVEREATING: MORE THAN HALF THE DAYS
SUM OF ALL RESPONSES TO PHQ QUESTIONS 1-9: 13

## 2020-02-10 NOTE — PROGRESS NOTES
"PSYCHIATRY FOLLOW-UP NOTE      Chief Complaint   Patient presents with   • Follow-Up     depression, anxiety         History Of Present Illness:  Mike Miller is a 19 y.o. old male with depressive disorder, generalized anxiety disorder, social anxiety disorder comes in today for follow up, was last seen 4 months ago.  He has been doing all right in regards to his depression and anxiety since his last visit here.  He was struggling with more depression and lack of motivation and decided to restart his Wellbutrin again.  He initially felt anxious but has been back on it for a month and is noticing improvements in his mood and motivation levels.  He is still struggling to get a good night sleep and is scheduled to have a sleep study later this week.  His fatigue continues to be a problem for him.  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  He denies any new psychosocial stressors.  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  He talked about how he perceives world different than other people and his lack of emotions around other people's problems.  He continues to be compliant with Zoloft and feels that going back on Wellbutrin has helped with his libido as well.  He denies having thoughts of wanting to hurt himself or others.    Social History:   He is currently single, has never been  and has no kids.  He lives with his parents in [place].  He graduated EMT school.  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    Substance Use:  Alcohol - Infrequent alcohol use   Nicotine - Denies  Illicit drugs - He has been using CBD drops on a daily basis    Past Medication Trials:  Wellbutrin  mg (s/e - \"more anxious\")    Medications:  Current Outpatient Medications " "  Medication Sig Dispense Refill   • buPROPion SR (WELLBUTRIN-SR) 150 MG TABLET SR 12 HR sustained-release tablet Take 1 Tab by mouth every morning. 90 Tab 0   • sertraline (ZOLOFT) 100 MG Tab Take 1 Tab by mouth every bedtime. 90 Tab 0   • pantoprazole (PROTONIX) 40 MG Tablet Delayed Response TAKE 1 TABLET BY MOUTH EVERY DAY 30 Tab 10   • multivitamin (THERAGRAN) Tab Take 1 Tab by mouth every day.     • doxycycline (VIBRAMYCIN) 100 MG Tab TAKE 1 TAB BY MOUTH 2 TIMES A DAY. (Patient not taking: Reported on 2/10/2020) 180 Tab 3     No current facility-administered medications for this visit.        Review Of Systems:    Constitutional - Positive for fatigue  Respiratory - Negative for shortness of breath, cough  CVS - Negative for chest pain, palpitations  GI - Negative for nausea, vomiting, abdominal pain, diarrhea, constipation  Musculoskeletal - Positive for back pain  Neurological - Negative for headaches  Skin - Positive for acne  Psychiatric - Positive for infrequent depression, anxiety, poor sleep    Physical Examination:  Vital signs: /78   Pulse 88   Ht 1.651 m (5' 5\")   Wt 85.3 kg (188 lb)   BMI 31.28 kg/m²     Musculoskeletal: Normal gait. No abnormal movements.     Mental Status Evaluation:   General: Young male with right forearm deformity, dressed in casual attire, good grooming and hygiene, in no apparent distress, calm and cooperative, good eye contact, no psychomotor agitation or retardation   Orientation: Alert and oriented to person, place and time  Recent and remote memory: Grossly intact  Attention span and concentration: Grossly intact  Speech: Spontaneous, normal rate, rhythm and tone  Thought Process: Linear, logical and goal directed  Thought Content: Denies suicidal or homicidal ideations, intent or plan  Perception: Denies auditory or visual hallucinations. No delusions noted  Associations: Intact  Language: Appropriate  Fund of knowledge and vocabulary: Grossly adequate  Mood: " "\"alright\"  Affect: Euthymic, mood congruent  Insight: Good  Judgment: Good    Depression screening:  Depression Screen (PHQ-2/PHQ-9) 3/27/2019 10/9/2019 2/10/2020   PHQ-2 Total Score - - 2   PHQ-2 Total Score - - -   PHQ-2 Total Score 0 0 -   PHQ-9 Total Score - - 13     Interpretation of PHQ-9 Total Score   Score Severity   1-4 No Depression   5-9 Mild Depression   10-14 Moderate Depression   15-19 Moderately Severe Depression   20-27 Severe Depression    Medical Records/Labs/Diagnostic Tests Reviewed:  [place]  records - no prescribed controlled medications found in the last 1 year      Impression:  1.  Generalized anxiety disorder - stable  2.  Social anxiety disorder - stable  3.  Unspecified depressive disorder - stable    Plan:  1.  Continue Wellbutrin  mg in the morning and Zoloft 100 mg at bedtime for anxiety and depression  2.  Continue individual psychotherapy with Dr. Jordin Chavez, Ed.D    Return to clinic in 4 months or sooner if symptoms worsen    The proposed treatment plan was discussed with the patient who was provided the opportunity to ask questions and make suggestions regarding alternative treatment. Patient verbalized understanding and expressed agreement with the plan.     Leona Emerson M.D.  02/10/20    This note was created using voice recognition software (Dragon). The accuracy of the dictation is limited by the abilities of the software. I have reviewed the note prior to signing, however some errors in grammar and context are still possible. If you have any questions related to this note please do not hesitate to contact our office.     "

## 2020-02-12 ENCOUNTER — SLEEP CENTER VISIT (OUTPATIENT)
Dept: SLEEP MEDICINE | Facility: MEDICAL CENTER | Age: 20
End: 2020-02-12
Payer: COMMERCIAL

## 2020-02-12 ENCOUNTER — OFFICE VISIT (OUTPATIENT)
Dept: BEHAVIORAL HEALTH | Facility: CLINIC | Age: 20
End: 2020-02-12
Payer: COMMERCIAL

## 2020-02-12 VITALS
BODY MASS INDEX: 30.13 KG/M2 | RESPIRATION RATE: 14 BRPM | DIASTOLIC BLOOD PRESSURE: 74 MMHG | SYSTOLIC BLOOD PRESSURE: 114 MMHG | WEIGHT: 192 LBS | OXYGEN SATURATION: 98 % | HEART RATE: 73 BPM | HEIGHT: 67 IN

## 2020-02-12 DIAGNOSIS — G47.10 HYPERSOMNIA: ICD-10-CM

## 2020-02-12 DIAGNOSIS — G47.30 SLEEP DISORDER BREATHING: ICD-10-CM

## 2020-02-12 DIAGNOSIS — F41.1 GAD (GENERALIZED ANXIETY DISORDER): ICD-10-CM

## 2020-02-12 DIAGNOSIS — F40.10 SOCIAL ANXIETY DISORDER: ICD-10-CM

## 2020-02-12 PROCEDURE — 90834 PSYTX W PT 45 MINUTES: CPT | Performed by: PSYCHOLOGIST

## 2020-02-12 PROCEDURE — 99204 OFFICE O/P NEW MOD 45 MIN: CPT | Performed by: FAMILY MEDICINE

## 2020-02-12 NOTE — PATIENT INSTRUCTIONS
.    Sleep Apnea  Sleep apnea is a condition in which breathing pauses or becomes shallow during sleep. Episodes of sleep apnea usually last 10 seconds or longer, and they may occur as many as 20 times an hour. Sleep apnea disrupts your sleep and keeps your body from getting the rest that it needs. This condition can increase your risk of certain health problems, including:  · Heart attack.  · Stroke.  · Obesity.  · Diabetes.  · Heart failure.  · Irregular heartbeat.  There are three kinds of sleep apnea:  · Obstructive sleep apnea. This kind is caused by a blocked or collapsed airway.  · Central sleep apnea. This kind happens when the part of the brain that controls breathing does not send the correct signals to the muscles that control breathing.  · Mixed sleep apnea. This is a combination of obstructive and central sleep apnea.  What are the causes?  The most common cause of this condition is a collapsed or blocked airway. An airway can collapse or become blocked if:  · Your throat muscles are abnormally relaxed.  · Your tongue and tonsils are larger than normal.  · You are overweight.  · Your airway is smaller than normal.  What increases the risk?  This condition is more likely to develop in people who:  · Are overweight.  · Smoke.  · Have a smaller than normal airway.  · Are elderly.  · Are male.  · Drink alcohol.  · Take sedatives or tranquilizers.  · Have a family history of sleep apnea.  What are the signs or symptoms?  Symptoms of this condition include:  · Trouble staying asleep.  · Daytime sleepiness and tiredness.  · Irritability.  · Loud snoring.  · Morning headaches.  · Trouble concentrating.  · Forgetfulness.  · Decreased interest in sex.  · Unexplained sleepiness.  · Mood swings.  · Personality changes.  · Feelings of depression.  · Waking up often during the night to urinate.  · Dry mouth.  · Sore throat.  How is this diagnosed?  This condition may be diagnosed with:  · A medical history.  · A  physical exam.  · A series of tests that are done while you are sleeping (sleep study). These tests are usually done in a sleep lab, but they may also be done at home.  How is this treated?  Treatment for this condition aims to restore normal breathing and to ease symptoms during sleep. It may involve managing health issues that can affect breathing, such as high blood pressure or obesity. Treatment may include:  · Sleeping on your side.  · Using a decongestant if you have nasal congestion.  · Avoiding the use of depressants, including alcohol, sedatives, and narcotics.  · Losing weight if you are overweight.  · Making changes to your diet.  · Quitting smoking.  · Using a device to open your airway while you sleep, such as:  ¨ An oral appliance. This is a custom-made mouthpiece that shifts your lower jaw forward.  ¨ A continuous positive airway pressure (CPAP) device. This device delivers oxygen to your airway through a mask.  ¨ A nasal expiratory positive airway pressure (EPAP) device. This device has valves that you put into each nostril.  ¨ A bi-level positive airway pressure (BPAP) device. This device delivers oxygen to your airway through a mask.  · Surgery if other treatments do not work. During surgery, excess tissue is removed to create a wider airway.  It is important to get treatment for sleep apnea. Without treatment, this condition can lead to:  · High blood pressure.  · Coronary artery disease.  · (Men) An inability to achieve or maintain an erection (impotence).  · Reduced thinking abilities.  Follow these instructions at home:  · Make any lifestyle changes that your health care provider recommends.  · Eat a healthy, well-balanced diet.  · Take over-the-counter and prescription medicines only as told by your health care provider.  · Avoid using depressants, including alcohol, sedatives, and narcotics.  · Take steps to lose weight if you are overweight.  · If you were given a device to open your airway  while you sleep, use it only as told by your health care provider.  · Do not use any tobacco products, such as cigarettes, chewing tobacco, and e-cigarettes. If you need help quitting, ask your health care provider.  · Keep all follow-up visits as told by your health care provider. This is important.  Contact a health care provider if:  · The device that you received to open your airway during sleep is uncomfortable or does not seem to be working.  · Your symptoms do not improve.  · Your symptoms get worse.  Get help right away if:  · You develop chest pain.  · You develop shortness of breath.  · You develop discomfort in your back, arms, or stomach.  · You have trouble speaking.  · You have weakness on one side of your body.  · You have drooping in your face.  These symptoms may represent a serious problem that is an emergency. Do not wait to see if the symptoms will go away. Get medical help right away. Call your local emergency services (911 in the [place] Do not drive yourself to the hospital.   This information is not intended to replace advice given to you by your health care provider. Make sure you discuss any questions you have with your health care provider.  Document Released: 12/08/2003 Document Revised: 08/13/2017 Document Reviewed: 09/26/2016  [employer] Interactive Patient Education © 2017 [employer]

## 2020-02-12 NOTE — PROGRESS NOTES
"     [facility]  Consult Note     Date: 2/12/2020 / Time: 11:06 AM    Patient ID:   Name:             Mike Miller   YOB: 2000  Age:                 19 y.o.  male   MRN:               [id number]      Thank you for requesting a sleep medicine consultation on Mike Miller at the sleep center. He presents today with the chief complaints of excessive daytime sleepiness. He is referred by Demarco Mayberry for evaluation and treatment of sleep disorder breathing.     HISTORY OF PRESENT ILLNESS:       He works swing shift. At night,  Mike Miller goes to bed around 1-3 am on weekdays and  on the weekends. He gets out of bed at 11 am on weekdays and at 12 pm on the weekends.  He averages about 8 hrs of sleep on a good night. However on occasions he sleeps 10-12 hrs . He falls asleep within 20 minutes. He awakens 0-1 times during the night due to no obvious reason.    He is not aware of snoring/breathing pauses/gasping or choking in sleep.  He  denies any symptoms of restless legs syndrome such as an \"urge to move\"  He  legs in the evening or bedtime. He  denies any symptoms of narcolepsy such as sleep paralysis or cataplexy, or any symptoms to suggest parasomnias such as sleep walking or acting out of dreams. He  has not used any medications for the sleep problem.    Overall, he doesnot finds his sleep refreshing. In terms of  excessive daytime sleepiness, he complains of sleepiness while  at work, while reading or watching TV and occasionally while driving. [place] sleepiness scale score is 16/24.He does take regular naps.     REVIEW OF SYSTEMS:       Constitutional: Denies fevers, Denies weight changes  Eyes: Denies changes in vision, no eye pain  Ears/Nose/Throat/Mouth: Denies nasal congestion or sore throat   Cardiovascular: Denies chest pain or palpitations   Respiratory: Denies shortness of breath , Denies cough  Gastrointestinal/Hepatic: Denies abdominal pain, nausea, vomiting, " d  Musculoskeletal/Rheum: Denies  joint pain and swelling   Skin/Breast: Denies rash  Neurological: Denies headache, confusion, memory loss or focal weakness/parasthesias  Psychiatric: + anxiety and depression      Comprehensive review of systems form is reviewed with the patient and is attached in the EMR.     PMH:  has a past medical history of Anxiety, Asthma in remission, Back pain, Cold (12/21/2017), Depression, GERD (gastroesophageal reflux disease), Panic disorder, Pneumonia, Psychiatric problem (12/2017), and Tic disorder. He also has no past medical history of Self-injurious behavior, Substance abuse (HCC), Suicide attempt (HCC), Withdrawal symptoms, alcohol (HCC), or Withdrawal symptoms, drug or narcotic (HCC).  MEDS:   Current Outpatient Medications:   •  MELATONIN PO, Take  by mouth., Disp: , Rfl:   •  buPROPion SR (WELLBUTRIN-SR) 150 MG TABLET SR 12 HR sustained-release tablet, Take 1 Tab by mouth every morning., Disp: 90 Tab, Rfl: 0  •  sertraline (ZOLOFT) 100 MG Tab, Take 1 Tab by mouth every bedtime., Disp: 90 Tab, Rfl: 0  •  pantoprazole (PROTONIX) 40 MG Tablet Delayed Response, TAKE 1 TABLET BY MOUTH EVERY DAY, Disp: 30 Tab, Rfl: 10  •  multivitamin (THERAGRAN) Tab, Take 1 Tab by mouth every day., Disp: , Rfl:   •  doxycycline (VIBRAMYCIN) 100 MG Tab, TAKE 1 TAB BY MOUTH 2 TIMES A DAY. 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 He smoked 0.00 packs per day. He has never used smokeless tobacco. He reports current alcohol use. He reports previous drug use. Frequency: 7.00 times per week. Drugs: Marijuana and Oral.     FH:   Family  "History   Problem Relation Age of Onset   • Hypertension Father    • Heart Disease Father         s/p CABG   • Depression Father    • Other Father         Spinal stenosis   • Depression Mother         on Wellbutrin   • Sleep Apnea Neg Hx            Physical Exam:  Vitals/ General Appearance:   Weight/BMI: Body mass index is 30.07 kg/m².  /74 (BP Location: Left arm, Patient Position: Sitting, BP Cuff Size: Adult)   Pulse 73   Resp 14   Ht 1.702 m (5' 7\")   Wt 87.1 kg (192 lb)   SpO2 98%   Vitals:    02/12/20 1049   BP: 114/74   BP Location: Left arm   Patient Position: Sitting   BP Cuff Size: Adult   Pulse: 73   Resp: 14   SpO2: 98%   Weight: 87.1 kg (192 lb)   Height: 1.702 m (5' 7\")       Pt. is alert and oriented to time, place and person. Cooperative and in no apparent distress.       -Head: Atraumatic, normocephalic.   -. Ears: Normal tympanic membrane and no discharge  -. Nose: = inferior turbinate hypertophy, + right septal deviation   -. Throat: Oropharynx appears crowded in that the palate is  overhanging (Malam Isa scale 3. Tonsils are not enlarged, uvula is large, pharynx not inflamed. .   -. Neck: Supple. No thyromegaly  -. Chest: Trachea central, no spine deformity   -. Lungs auscultation: B/L good air entry, vesicular breath sounds, no adventitious sounds  -. Heart auscultation: 1st and 2nd heart sounds normal, regular rhythm. No appreciable murmur.  - Extremities: no pedal edema.  - Skin: No rash  - NEUROLOGICAL EXAMINATION: On neurological exam, the patient was alert and oriented x3. speech was clear and fluent without dysarthria.      INVESTIGATIONS:       ASSESSMENT AND PLAN     1. Obstructive Sleep Apnea (ABBI).      The pathophysiology of ABBI and the increased risk of cardiovascular morbidity from untreated ABBI is discussed in detail with the patient.    We have discussed diagnostic options including in-laboratory, attended polysomnography and home sleep testing. We have also discussed " treatment options including airway pressurization, reconstructive otolaryngologic surgery, dental appliances and weight management.       Subsequently,treatment options will be discussed based on the diagnostic study. Meanwhile, He is urged to avoid supine sleep, weight gain and alcoholic beverages since all of these can worsen ABBI. He is cautioned against drowsy driving. If He feels sleepy while driving, He must pull over for a break/nap, rather than persist on the road, in the interest of He own safety and that of others on the road.    Plan  -  He  will be scheduled for an overnight PSG  to assess sleep related  breathing disorder.    2. Hypersomnolence: likely due to shift work disorder and possible ABBI     He denies REM-related phenomena such as cataplexy, sleep paralysis etc. he denies any current use use of recreational drug.     Meanwhile, she is is cautioned against drowsy driving and operating heavy machinery. If she feels sleepy while driving, she must pull over for a break/nap, rather than persist on the road, in the interest of her own safety and that of others on the road      Plan    o Reassess after the SS   - sleep hygiene and scheduled naps was discussed    - medications were dicussed briefly   - Hold off on MSLT due to lack of narcolepsy symptoms      3. Regarding treatment of other past medical problems and general health maintenance,  He is urged to follow up with PCP.
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
## 2020-02-19 ENCOUNTER — OFFICE VISIT (OUTPATIENT)
Dept: BEHAVIORAL HEALTH | Facility: CLINIC | Age: 20
End: 2020-02-19
Payer: COMMERCIAL

## 2020-02-19 DIAGNOSIS — F41.1 GAD (GENERALIZED ANXIETY DISORDER): ICD-10-CM

## 2020-02-19 DIAGNOSIS — F40.10 SOCIAL ANXIETY DISORDER: ICD-10-CM

## 2020-02-19 PROCEDURE — 90834 PSYTX W PT 45 MINUTES: CPT | Performed by: PSYCHOLOGIST

## 2020-02-26 ENCOUNTER — OFFICE VISIT (OUTPATIENT)
Dept: BEHAVIORAL HEALTH | Facility: CLINIC | Age: 20
End: 2020-02-26
Payer: COMMERCIAL

## 2020-02-26 DIAGNOSIS — F41.1 GAD (GENERALIZED ANXIETY DISORDER): ICD-10-CM

## 2020-02-26 DIAGNOSIS — F40.10 SOCIAL ANXIETY DISORDER: ICD-10-CM

## 2020-02-26 PROCEDURE — 90834 PSYTX W PT 45 MINUTES: CPT | Performed by: PSYCHOLOGIST
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
## 2020-04-13 ENCOUNTER — TELEMEDICINE (OUTPATIENT)
Dept: BEHAVIORAL HEALTH | Facility: CLINIC | Age: 20
End: 2020-04-13
Payer: COMMERCIAL

## 2020-04-13 DIAGNOSIS — F40.10 SOCIAL PHOBIA: ICD-10-CM

## 2020-04-13 DIAGNOSIS — F41.1 GENERALIZED ANXIETY DISORDER: ICD-10-CM

## 2020-04-13 DIAGNOSIS — F32.A DEPRESSIVE DISORDER: ICD-10-CM

## 2020-04-13 NOTE — BH THERAPY
[facility] Progress Note    Patient Name: Mike Miller  Patient MRN: [id number]  Today's Date: 4/13/2020    Type of session:Individual psychotherapy  Length of session: 38 minutes  Persons in attendance:Patient    Subjective/New Info: The patient ID/Chief Complaint:  The patient is a 20 year old male, single, .  The patient was referred by psychiatry and voluntarily presented for an individual intake to address anxiety symptoms including chronic worrying and mild to moderate social anxiety with some evidence of vocal tic related to anxiety.  Reviewed limits of confidentiality and [facility] policies; patient expressed understanding and agreed to voluntarily proceed with evaluation and treatment.     Interval History:   Session Focus: Met with the patient per their request via (HIPPA compliant) Zoom video conference to accommodate state imposed restrictions on social contact due to the COVID-19 pandemic.The patient's estimated global assessment of functioning suggests a moderate degree of emotional distress and difficulty dealing with relationships, work and/or school life.  The patient reports some mild increase in anxiety symptoms.  The patient reported some concerns about staying home as he is lost his position.  Patient also reports that he is discontinue the use of cannabis but since discontinuing he is experienced nightly nightmares.    Therapeutic Intervention: Cognitive Behavioral Therapy      Planned Intervention: examining automatic thought and using thought records    Objective/Observations:      Patient did not present in acute distress. Patient was appropriately groomed. Patient was alert and oriented x4. Eye contact was appropriate. No abnormalities in attention or concentration were noted. No abnormalities of movement present; psychomotor activity was normal. Speech was fluent and regular in rhythm, rate, volume, and tone. Thought processes  linear, logical and goal directed. There was no evidence of thought disorder. No auditory or visual hallucinations. Long and short term memory appeared to be intact. Insight, judgment, and impulse control were deemed to be good.  Reported mood was “anxious.” Affect was full-ranging and appropriate to thought content and conversation.  Patient denied past and current suicidal and homicidal ideation in plan, intent, and preparatory behavior.         Diagnoses:   1. Social anxiety disorder    2. IRMA (generalized anxiety disorder)    3. Depressive disorder       The patient denied any suicide-related ideation and/or behaviors and intent/plan, denied thoughts about death and dying both in session and during the period since last appointment, or past 2 weeks.    Risk Level: Not Currently at Clinically Significant Risk  Hospitalization is not deemed necessary at this time as the patient does not present a clear or imminent danger to self or others. No indication for pursuing higher level of care. Outpatient management is currently most appropriate and least restrictive level of care.  Current risk:   SUICIDE: Low   Homicide: Not applicable   Self-harm: Not applicable   Relapse: Not applicable   Other:    Safety Plan reviewed? Not Indicated   If evidence of imminent risk is present, intervention/plan:         Treatment Goal(s)/Objective(s) addressed:    Anxiety   Goal: Develop strategies to reduce symptoms, or   Reduce anxiety and improve coping skills   Report feeling more positive about self and abilities during therapy sessions   Develop strategies for thought distraction when fixating on the future     Progress toward Treatment Goals: Mild improvement    Plan:    1) The patient will return to the clinic 1-2 weeks.  2) Crisis Response Plan:  Reviewed emergency resources with the patient and the patient expressed understanding including:  If feeling suicidal, patient will call or present to the [facility]  during duty hours or present to closest ED ([facility] or [facility], call 911 or crisis hotline ([phone]-SXCX) after duty hours.  3) Referrals/Consults:  N/A  4) Barriers to Learning:  No  5) Readiness to Learn:  Yes  6) Cultural Concerns:  No  7) Patient voiced understanding of, and agreement with, plan and goals as annotated above.  8) Declare these services are medically necessary and appropriate to the patient’s diagnosis and needs  9) The point of contact at the [facility] regarding this evaluation is Dr. Chavez, Psychologist.      Jordin Chavez III, EdDAVY.

## 2020-04-16 ENCOUNTER — TELEMEDICINE (OUTPATIENT)
Dept: BEHAVIORAL HEALTH | Facility: CLINIC | Age: 20
End: 2020-04-16
Payer: COMMERCIAL

## 2020-04-16 DIAGNOSIS — F41.1 GENERALIZED ANXIETY DISORDER: ICD-10-CM

## 2020-04-16 DIAGNOSIS — F32.A DEPRESSIVE DISORDER: ICD-10-CM

## 2020-04-16 DIAGNOSIS — F40.10 SOCIAL PHOBIA: ICD-10-CM

## 2020-04-16 NOTE — BH THERAPY
[facility]  Therapy Progress Note    Met with the patient per their request via (HIPPA compliant) Zoom video conference to accommodate state imposed restrictions on social contact due to the COVID-19 pandemic.    Patient Name: Mike Miller  Patient MRN: [id number]  Today's Date: 4/16/2020    Type of session:Individual psychotherapy  Length of session: 38 minutes  Persons in attendance:Patient    Subjective/New Info: The patient ID/Chief Complaint:  The patient is a 20 year old male, single, .  The patient was referred by psychiatry and voluntarily presented for an individual intake to address anxiety symptoms including chronic worrying and mild to moderate social anxiety with some evidence of vocal tic related to anxiety.  Reviewed limits of confidentiality and [facility] policies; patient expressed understanding and agreed to voluntarily proceed with evaluation and treatment.     Interval History:   Session Focus: The patient continues to experience nightmares discussed with the patient emotional regulation skills associated with the nightmare protocol step-by-step.  Also encouraged the patient to engage in self monitoring of his relaxation practices while he makes attempts to modify his nightmares.  Other emotional regulation skills were also discussed including the way of examining how much energy he is putting in to skills practice.    Therapeutic Intervention: Cognitive Behavioral Therapy      Planned Intervention: examining automatic thought and using thought records    Objective/Observations:      Patient did not present in acute distress. Patient was appropriately groomed. Patient was alert and oriented x4. Eye contact was appropriate. No abnormalities in attention or concentration were noted. No abnormalities of movement present; psychomotor activity was normal. Speech was fluent and regular in rhythm, rate, volume, and tone. Thought processes linear, logical  and goal directed. There was no evidence of thought disorder. No auditory or visual hallucinations. Long and short term memory appeared to be intact. Insight, judgment, and impulse control were deemed to be good.  Reported mood was “comfortable.” Affect was full-ranging and appropriate to thought content and conversation.  Patient denied past and current suicidal and homicidal ideation in plan, intent, and preparatory behavior.         Diagnoses:   1. IRMA (generalized anxiety disorder)    2. Social anxiety disorder    3. Depressive disorder       The patient denied any suicide-related ideation and/or behaviors and intent/plan, denied thoughts about death and dying both in session and during the period since last appointment, or past 2 weeks.    Risk Level: Not Currently at Clinically Significant Risk  Hospitalization is not deemed necessary at this time as the patient does not present a clear or imminent danger to self or others. No indication for pursuing higher level of care. Outpatient management is currently most appropriate and least restrictive level of care.  Current risk:   SUICIDE: Low   Homicide: Not applicable   Self-harm: Not applicable   Relapse: Not applicable   Other:    Safety Plan reviewed? Not Indicated   If evidence of imminent risk is present, intervention/plan:         Treatment Goal(s)/Objective(s) addressed:    Anxiety   Goal: Develop strategies to reduce symptoms, or   Reduce anxiety and improve coping skills   Report feeling more positive about self and abilities during therapy sessions   Develop strategies for thought distraction when fixating on the future     Progress toward Treatment Goals: Mild improvement    Plan:    1) The patient will return to the clinic 2-3 weeks.  2) Crisis Response Plan:  Reviewed emergency resources with the patient and the patient expressed understanding including:  If feeling suicidal, patient will call or present to the [facility] during duty  hours or present to closest ED ([facility] or [facility], call 911 or crisis hotline ([phone]-RVQI) after duty hours.  3) Referrals/Consults:  N/A  4) Barriers to Learning:  No  5) Readiness to Learn:  Yes  6) Cultural Concerns:  No  7) Patient voiced understanding of, and agreement with, plan and goals as annotated above.  8) Declare these services are medically necessary and appropriate to the patient’s diagnosis and needs  9) The point of contact at the [facility] regarding this evaluation is Dr. Chavez, Psychologist.      Jordin Chavez III, Ed.D.

## 2020-04-27 ENCOUNTER — TELEMEDICINE (OUTPATIENT)
Dept: BEHAVIORAL HEALTH | Facility: CLINIC | Age: 20
End: 2020-04-27
Payer: COMMERCIAL

## 2020-04-27 DIAGNOSIS — F41.1 GENERALIZED ANXIETY DISORDER: ICD-10-CM

## 2020-04-27 DIAGNOSIS — F40.10 SOCIAL PHOBIA: ICD-10-CM

## 2020-05-04 ENCOUNTER — TELEMEDICINE (OUTPATIENT)
Dept: BEHAVIORAL HEALTH | Facility: CLINIC | Age: 20
End: 2020-05-04
Payer: COMMERCIAL

## 2020-05-04 DIAGNOSIS — F41.1 GENERALIZED ANXIETY DISORDER: ICD-10-CM

## 2020-05-04 DIAGNOSIS — F40.10 SOCIAL PHOBIA: ICD-10-CM

## 2020-05-04 NOTE — BH THERAPY
[facility]  Therapy Progress Note    Met with the patient per their request via (HIPPA compliant) Zoom video conference to accommodate state imposed restrictions on social contact due to the COVID-19 pandemic.    Patient Name: Mike Miller  Patient MRN: [id number]  Today's Date: 5/4/2020    Type of session:Individual psychotherapy  Length of session: 38 minutes  Persons in attendance:Patient    Subjective/New Info: The patient ID/Chief Complaint:  The patient is a 20 year old male, single, .  The patient was referred by psychiatry and voluntarily presented for an individual intake to address anxiety symptoms including chronic worrying and mild to moderate social anxiety with some evidence of vocal tic related to anxiety.  Reviewed limits of confidentiality and [facility] policies; patient expressed understanding and agreed to voluntarily proceed with evaluation and treatment.     Interval History:   Session Focus: The patient's estimated global assessment of functioning indicates a mild level of difficulty with some problems in relationships, work, or school functioning.  The patient reports that he continues to experience less intense nightmares and that he has noticed a shift that he is now observing versus participating in the nightmares.  Started to explore with the patient schemas associated with differentiation from his father including trust, power and control, safety, self-efficacy/self-esteem, and intimacy.  At the next session will focus on trust.    Therapeutic Intervention: Cognitive Behavioral Therapy      Planned Intervention: examining automatic thought and using thought records    Objective/Observations:      Patient did not present in acute distress. Patient was appropriately groomed. Patient was alert and oriented x4. Eye contact was appropriate. No abnormalities in attention or concentration were noted. No abnormalities of movement present;  psychomotor activity was normal. Speech was fluent and regular in rhythm, rate, volume, and tone. Thought processes linear, logical and goal directed. There was no evidence of thought disorder. No auditory or visual hallucinations. Long and short term memory appeared to be intact. Insight, judgment, and impulse control were deemed to be good.  Reported mood was “comfortable.” Affect was full-ranging and appropriate to thought content and conversation.  Patient denied past and current suicidal and homicidal ideation in plan, intent, and preparatory behavior.         Diagnoses:   1. IRMA (generalized anxiety disorder)    2. Social anxiety disorder       The patient denied any suicide-related ideation and/or behaviors and intent/plan, denied thoughts about death and dying both in session and during the period since last appointment, or past 2 weeks.    Risk Level: Not Currently at Clinically Significant Risk  Hospitalization is not deemed necessary at this time as the patient does not present a clear or imminent danger to self or others. No indication for pursuing higher level of care. Outpatient management is currently most appropriate and least restrictive level of care.  Current risk:   SUICIDE: Low   Homicide: Not applicable   Self-harm: Not applicable   Relapse: Not applicable   Other:    Safety Plan reviewed? Not Indicated   If evidence of imminent risk is present, intervention/plan:         Treatment Goal(s)/Objective(s) addressed:    Anxiety   Goal: Develop strategies to reduce symptoms, or   Reduce anxiety and improve coping skills   Report feeling more positive about self and abilities during therapy sessions   Develop strategies for thought distraction when fixating on the future     Progress toward Treatment Goals: Significant improvement    Plan:    1) The patient will return to the clinic 3-4 weeks.  2) Crisis Response Plan:  Reviewed emergency resources with the patient and the patient expressed  understanding including:  If feeling suicidal, patient will call or present to the [facility] during duty hours or present to closest ED ([facility] or [facility], call 911 or crisis hotline ([phone]-PUOW) after duty hours.  3) Referrals/Consults:  N/A  4) Barriers to Learning:  No  5) Readiness to Learn:  Yes  6) Cultural Concerns:  No  7) Patient voiced understanding of, and agreement with, plan and goals as annotated above.  8) Declare these services are medically necessary and appropriate to the patient’s diagnosis and needs  9) The point of contact at the [facility] regarding this evaluation is Dr. Chavez, Psychologist.      Jordin Chavez III, Ed.D.

## 2020-05-11 ENCOUNTER — TELEMEDICINE (OUTPATIENT)
Dept: BEHAVIORAL HEALTH | Facility: CLINIC | Age: 20
End: 2020-05-11
Payer: COMMERCIAL

## 2020-05-11 ENCOUNTER — OFFICE VISIT (OUTPATIENT)
Dept: URGENT CARE | Facility: CLINIC | Age: 20
End: 2020-05-11
Payer: COMMERCIAL

## 2020-05-11 VITALS
DIASTOLIC BLOOD PRESSURE: 84 MMHG | WEIGHT: 180 LBS | RESPIRATION RATE: 12 BRPM | SYSTOLIC BLOOD PRESSURE: 112 MMHG | OXYGEN SATURATION: 96 % | HEIGHT: 66 IN | HEART RATE: 89 BPM | TEMPERATURE: 98 F | BODY MASS INDEX: 28.93 KG/M2

## 2020-05-11 DIAGNOSIS — F41.1 GENERALIZED ANXIETY DISORDER: ICD-10-CM

## 2020-05-11 DIAGNOSIS — H92.01 REFERRED OTALGIA OF RIGHT EAR: ICD-10-CM

## 2020-05-11 DIAGNOSIS — F40.10 SOCIAL PHOBIA: ICD-10-CM

## 2020-05-11 PROCEDURE — 99203 OFFICE O/P NEW LOW 30 MIN: CPT | Performed by: PHYSICIAN ASSISTANT

## 2020-05-11 ASSESSMENT — ENCOUNTER SYMPTOMS
EYE DISCHARGE: 0
FEVER: 0
COUGH: 0
HEADACHES: 1
ABDOMINAL PAIN: 0
SORE THROAT: 0
MYALGIAS: 0
NECK PAIN: 0
DIZZINESS: 0
SINUS PAIN: 0
VOMITING: 0
NAUSEA: 0
DIARRHEA: 0
WHEEZING: 0
EYE REDNESS: 0
CHILLS: 0
SPUTUM PRODUCTION: 0
SHORTNESS OF BREATH: 0

## 2020-05-11 ASSESSMENT — FIBROSIS 4 INDEX: FIB4 SCORE: .5102499677469845922

## 2020-05-11 NOTE — BH THERAPY
[facility]  Therapy Progress Note    Met with the patient per their request via (HIPPA compliant) Zoom video conference to accommodate state imposed restrictions on social contact due to the COVID-19 pandemic.    Patient Name: Mike Miller  Patient MRN: [id number]  Today's Date: 5/11/2020    Type of session:Individual psychotherapy  Length of session: 38 minutes  Persons in attendance:Patient    Subjective/New Info: The patient ID/Chief Complaint:  The patient is a 20 year old male, single, .  The patient was referred by psychiatry and voluntarily presented for an individual intake to address anxiety symptoms including chronic worrying and mild to moderate social anxiety with some evidence of vocal tic related to anxiety.  Reviewed limits of confidentiality and [facility] policies; patient expressed understanding and agreed to voluntarily proceed with evaluation and treatment.     Interval History:   Session Focus: The patient's estimated global assessment of functioning indicates a mild level of difficulty with some problems in relationships, work, or school functioning.  Continue to focus on values associated with future relationships and started to explore with the patient schemas associated with differentiation from his father including trust, self-efficacy/self-esteem, and intimacy.    Therapeutic Intervention: Cognitive Behavioral Therapy      Planned Intervention: examining automatic thought and using thought records    Objective/Observations:      Patient did not present in acute distress. Patient was appropriately groomed. Patient was alert and oriented x4. Eye contact was appropriate. No abnormalities in attention or concentration were noted. No abnormalities of movement present; psychomotor activity was normal. Speech was fluent and regular in rhythm, rate, volume, and tone. Thought processes linear, logical and goal directed. There was no evidence of  thought disorder. No auditory or visual hallucinations. Long and short term memory appeared to be intact. Insight, judgment, and impulse control were deemed to be good.  Reported mood was “comfortable.” Affect was full-ranging and appropriate to thought content and conversation.  Patient denied past and current suicidal and homicidal ideation in plan, intent, and preparatory behavior.         Diagnoses:   1. IRMA (generalized anxiety disorder)    2. Social anxiety disorder       The patient denied any suicide-related ideation and/or behaviors and intent/plan, denied thoughts about death and dying both in session and during the period since last appointment, or past 2 weeks.    Risk Level: Not Currently at Clinically Significant Risk  Hospitalization is not deemed necessary at this time as the patient does not present a clear or imminent danger to self or others. No indication for pursuing higher level of care. Outpatient management is currently most appropriate and least restrictive level of care.  Current risk:   SUICIDE: Low   Homicide: Not applicable   Self-harm: Not applicable   Relapse: Not applicable   Other:    Safety Plan reviewed? Not Indicated   If evidence of imminent risk is present, intervention/plan:         Treatment Goal(s)/Objective(s) addressed:    Anxiety   Goal: Develop strategies to reduce symptoms, or   Reduce anxiety and improve coping skills   Report feeling more positive about self and abilities during therapy sessions   Develop strategies for thought distraction when fixating on the future     Progress toward Treatment Goals: Significant improvement    Plan:    1) The patient will return to the clinic 3-4 weeks.  2) Crisis Response Plan:  Reviewed emergency resources with the patient and the patient expressed understanding including:  If feeling suicidal, patient will call or present to the [facility] during duty hours or present to closest ED ([facility] or  [facility], call 911 or crisis hotline ([phone]-WENW) after duty hours.  3) Referrals/Consults:  N/A  4) Barriers to Learning:  No  5) Readiness to Learn:  Yes  6) Cultural Concerns:  No  7) Patient voiced understanding of, and agreement with, plan and goals as annotated above.  8) Declare these services are medically necessary and appropriate to the patient’s diagnosis and needs  9) The point of contact at the [facility] regarding this evaluation is Dr. Chavez, Psychologist.      Jordin Chavez III, EdDAVY.

## 2020-05-11 NOTE — PROGRESS NOTES
"Subjective:      Mike Miller is a 20 y.o. male who presents with Otalgia (x 1 1/2 week (right) - blood in ear,  )            HPI  20-year-old male presents to urgent care with new problem of right ear pain onset about 1.5 weeks ago.  Mild associated headaches.  Positive history of environmental allergies.  Patient denies fevers, cough, congestion, sore throat, or sinus pain.  Patient also reports reports tinnitus, states this is normal for him secondary to listening to loud music.  He denies dizziness.  Denies other associated aggravating or alleviating factors.      Review of Systems   Constitutional: Negative for chills, fever and malaise/fatigue.   HENT: Positive for ear pain and tinnitus. Negative for congestion, ear discharge, sinus pain and sore throat.    Eyes: Negative for discharge and redness.   Respiratory: Negative for cough, sputum production, shortness of breath and wheezing.    Cardiovascular: Negative for chest pain.   Gastrointestinal: Negative for abdominal pain, diarrhea, nausea and vomiting.   Musculoskeletal: Negative for myalgias and neck pain.   Skin: Negative for rash.   Neurological: Positive for headaches. Negative for dizziness.   Endo/Heme/Allergies: Positive for environmental allergies.   All other systems reviewed and are negative.    Past Medical History:   Diagnosis Date   • Anxiety    • Asthma in remission    • Back pain    • Cold 12/21/2017    \"I had a head cold about 2 weeks ago\"  denies SOB, productive cough   • Depression    • GERD (gastroesophageal reflux disease)    • Panic disorder    • Pneumonia    • Psychiatric problem 12/2017    depression   • Tic disorder      Medications and allergies reviewed in epic.  Social History     Tobacco Use   • Smoking status: Former Smoker     Packs/day: 0.00   • Smokeless tobacco: Never Used   Substance Use Topics   • Alcohol use: Yes     Alcohol/week: 0.0 oz     Comment: infrequent      Objective:     /84 (BP Location: Left arm, " "Patient Position: Sitting, BP Cuff Size: Adult)   Pulse 89   Temp 36.7 °C (98 °F) (Temporal)   Resp 12   Ht 1.676 m (5' 6\")   Wt 81.6 kg (180 lb)   SpO2 96%   BMI 29.05 kg/m²      Physical Exam  Vitals signs reviewed.   Constitutional:       General: He is not in acute distress.     Appearance: Normal appearance. He is well-developed. He is not ill-appearing.   HENT:      Head: Normocephalic and atraumatic.        Right Ear: Tympanic membrane and ear canal normal.      Left Ear: Tympanic membrane and ear canal normal.      Nose: Nose normal.      Mouth/Throat:      Mouth: Mucous membranes are moist.      Pharynx: Oropharynx is clear. No oropharyngeal exudate or posterior oropharyngeal erythema.   Eyes:      General: No scleral icterus.     Conjunctiva/sclera: Conjunctivae normal.   Neck:      Musculoskeletal: Normal range of motion and neck supple. No neck rigidity.   Cardiovascular:      Rate and Rhythm: Normal rate and regular rhythm.      Heart sounds: Normal heart sounds.   Pulmonary:      Effort: Pulmonary effort is normal. No respiratory distress.      Breath sounds: Normal breath sounds.   Musculoskeletal: Normal range of motion.   Lymphadenopathy:      Cervical: No cervical adenopathy.   Skin:     General: Skin is warm and dry.   Neurological:      General: No focal deficit present.      Mental Status: He is alert and oriented to person, place, and time.   Psychiatric:         Mood and Affect: Mood normal.         Behavior: Behavior normal.         Thought Content: Thought content normal.         Judgment: Judgment normal.                 Assessment/Plan:     1. Referred otalgia of right ear       Recommend Tylenol/Motrin for symptomatic relief.  Patient may wear  to prevent jaw clenching and grinding at night.  PT should follow up with PCP in 1-2 days for re-evaluation if symptoms have not improved.  Discussed red flags and reasons to return to .  Pt and/or family verbalized " understanding of diagnosis and follow up instructions and was offered informational handout on diagnosis.  PT discharged.

## 2020-05-13 ENCOUNTER — TELEMEDICINE (OUTPATIENT)
Dept: BEHAVIORAL HEALTH | Facility: CLINIC | Age: 20
End: 2020-05-13
Payer: COMMERCIAL

## 2020-05-13 VITALS — WEIGHT: 180 LBS | BODY MASS INDEX: 28.25 KG/M2 | HEIGHT: 67 IN

## 2020-05-13 DIAGNOSIS — F32.A DEPRESSIVE DISORDER: ICD-10-CM

## 2020-05-13 DIAGNOSIS — F40.10 SOCIAL PHOBIA: ICD-10-CM

## 2020-05-13 DIAGNOSIS — F41.1 GENERALIZED ANXIETY DISORDER: ICD-10-CM

## 2020-05-13 PROCEDURE — 99214 OFFICE O/P EST MOD 30 MIN: CPT | Mod: 95,CR | Performed by: PSYCHIATRY & NEUROLOGY

## 2020-05-13 RX ORDER — SERTRALINE HYDROCHLORIDE 100 MG/1
100 TABLET, FILM COATED ORAL
Qty: 90 TAB | Refills: 0 | Status: SHIPPED | OUTPATIENT
Start: 2020-05-13 | End: 2020-09-01 | Stop reason: SDUPTHER

## 2020-05-13 RX ORDER — BUPROPION HYDROCHLORIDE 200 MG/1
200 TABLET, EXTENDED RELEASE ORAL EVERY MORNING
Qty: 90 TAB | Refills: 0 | Status: SHIPPED | OUTPATIENT
Start: 2020-05-13 | End: 2020-08-10

## 2020-05-13 ASSESSMENT — FIBROSIS 4 INDEX: FIB4 SCORE: .5102499677469845922

## 2020-05-13 NOTE — PROGRESS NOTES
PSYCHIATRY TELEMEDICINE FOLLOW-UP NOTE      Chief Complaint   Patient presents with   • Follow-Up     depression, anxiety     This encounter was conducted via Zoom .   Verbal consent was obtained. Patient's identity was verified.    History Of Present Illness:  Mike Miller is a 20 y.o. old male with depressive disorder, generalized anxiety disorder, social anxiety disorder comes in today for follow up, was last seen 3 months ago.  He has been doing all right in regards to his depression and anxiety.  He had to quit both his jobs a few months ago because he lives with his parents and his mother has an immunodeficiency disorder.  His father wanted everybody at home to be safe and asked him to quit his jobs.  He is hoping that when things get better he will be able to get another job.  He is doing all right financially.  He is compliant with both Wellbutrin and Zoloft and endorses benefit.  He is still struggling with fatigue on a daily basis which seems to be his biggest issue at this point.  He is scheduled to have a sleep study but he is not think that he has underlying sleep apnea.  He has been taking an over-the-counter supplement, Adrafinil 300 mg on a daily basis and he feels that it has been helpful.  He is looking for prescription feels that he has chronic fatigue syndrome.  He denies any struggles with sleep or appetite.  He denies having thoughts of wanting to hurt himself or others.    Social History:   He is single, has never been  and has no kids.  He lives with his parents in [place].  He is currently unemployed.    Substance Use:  Alcohol - Infrequent alcohol use   Nicotine - Denies  Illicit drugs - Denies     Past Medication Trials:  None     Medications:  Current Outpatient Medications   Medication Sig Dispense Refill   • buPROPion SR (WELLBUTRIN-SR) 150 MG TABLET SR 12 HR sustained-release tablet Take 1 Tab by mouth every day. 90 Tab 0   • MELATONIN PO Take  by mouth.     • sertraline  "(ZOLOFT) 100 MG Tab Take 1 Tab by mouth every bedtime. 90 Tab 0   • pantoprazole (PROTONIX) 40 MG Tablet Delayed Response TAKE 1 TABLET BY MOUTH EVERY DAY 30 Tab 10   • multivitamin (THERAGRAN) Tab Take 1 Tab by mouth every day.     • doxycycline (VIBRAMYCIN) 100 MG Tab TAKE 1 TAB BY MOUTH 2 TIMES A DAY. (Patient not taking: Reported on 2/10/2020) 180 Tab 3     No current facility-administered medications for this visit.        Review Of Systems:    Constitutional - Positive for fatigue  Respiratory - Negative for shortness of breath, cough  [facility] - Negative for chest pain, palpitations  GI - Negative for nausea, vomiting, abdominal pain, diarrhea, constipation  Musculoskeletal - Negative for back pain  Neurological - Negative for headaches  Skin - Positive for acne  Psychiatric - Negative for depression, sleep problems.  Positive for infrequent anxiety    Physical Examination:  Vital signs: Ht 1.702 m (5' 7\")   Wt 81.6 kg (180 lb)   BMI 28.19 kg/m²     Musculoskeletal: No abnormal movements.     Mental Status Evaluation:   General: Young male, dressed in casual attire, good grooming and hygiene, in no apparent distress, calm and cooperative, good eye contact, no psychomotor agitation or retardation   Orientation: Alert and oriented to person, place and time  Recent and remote memory: Grossly intact  Attention span and concentration: Grossly intact  Speech: Spontaneous, normal rate, rhythm and tone  Thought Process: Linear, logical and goal directed  Thought Content: Denies suicidal or homicidal ideations, intent or plan  Perception: Denies auditory or visual hallucinations. No delusions noted  Associations: Intact  Language: Appropriate  Fund of knowledge and vocabulary: Grossly adequate  Mood: \"alright\"  Affect: Euthymic, mood congruent  Insight: Good  Judgment: Good    Depression screening:  Depression Screen (PHQ-2/PHQ-9) 3/27/2019 10/9/2019 2/10/2020   PHQ-2 Total Score - - 2   PHQ-2 Total Score - - -   PHQ-2 " Total Score 0 0 -   PHQ-9 Total Score - - 13     Interpretation of PHQ-9 Total Score   Score Severity   1-4 No Depression   5-9 Mild Depression   10-14 Moderate Depression   15-19 Moderately Severe Depression   20-27 Severe Depression    Medical Records/Labs/Diagnostic Tests Reviewed:  [place]  records - no prescribed controlled medications found in the last 1 year      Impression:  1.  Generalized anxiety disorder - stable  2.  Social anxiety disorder - stable  3.  Unspecified depressive disorder - stable    Plan:  1.  Continue Zoloft 100 mg at bedtime for anxiety and depression  2.  Increase Wellbutrin SR to 200 mg in the morning for anxiety and depression.  He is agreeable to trying a higher dose to see if that helps his fatigue.  3.  Informed him that I do not treat chronic fatigue.  He is looking into Modafinil as a treatment option and was informed that it is mainly used for narcolepsy and fatigue related to cancer.  4.  Continue individual psychotherapy with Dr. Jordin Chavez, Ed.D    Return to clinic in 3 months or sooner if symptoms worsen    The proposed treatment plan was discussed with the patient who was provided the opportunity to ask questions and make suggestions regarding alternative treatment. Patient verbalized understanding and expressed agreement with the plan.     Leona Emerson M.D.  02/10/20    This note was created using voice recognition software (Dragon). The accuracy of the dictation is limited by the abilities of the software. I have reviewed the note prior to signing, however some errors in grammar and context are still possible. If you have any questions related to this note please do not hesitate to contact our office.

## 2020-05-14 ENCOUNTER — HOME STUDY (OUTPATIENT)
Dept: SLEEP MEDICINE | Facility: MEDICAL CENTER | Age: 20
End: 2020-05-14
Attending: FAMILY MEDICINE
Payer: COMMERCIAL

## 2020-05-14 DIAGNOSIS — G47.10 HYPERSOMNIA: ICD-10-CM

## 2020-05-14 DIAGNOSIS — G47.30 SLEEP DISORDER BREATHING: ICD-10-CM

## 2020-05-14 PROCEDURE — 95806 SLEEP STUDY UNATT&RESP EFFT: CPT | Performed by: INTERNAL MEDICINE

## 2020-05-18 ENCOUNTER — TELEMEDICINE (OUTPATIENT)
Dept: BEHAVIORAL HEALTH | Facility: CLINIC | Age: 20
End: 2020-05-18
Payer: COMMERCIAL

## 2020-05-18 DIAGNOSIS — F40.10 SOCIAL PHOBIA: ICD-10-CM

## 2020-05-18 DIAGNOSIS — F41.1 GENERALIZED ANXIETY DISORDER: ICD-10-CM

## 2020-05-18 NOTE — BH THERAPY
[facility]  Therapy Progress Note    Met with the patient per their request via (HIPPA compliant) Zoom video conference to accommodate state imposed restrictions on social contact due to the COVID-19 pandemic.    Patient Name: Mike Miller  Patient MRN: [id number]  Today's Date: 5/18/2020    Type of session:Individual psychotherapy  Length of session: 38 minutes  Persons in attendance:Patient    Subjective/New Info: The patient ID/Chief Complaint:  The patient is a 20 year old male, single, .  The patient was referred by psychiatry and voluntarily presented for an individual intake to address anxiety symptoms including chronic worrying and mild to moderate social anxiety with some evidence of vocal tic related to anxiety.  Reviewed limits of confidentiality and [facility] policies; patient expressed understanding and agreed to voluntarily proceed with evaluation and treatment.     Interval History:   Session Focus: The patient's estimated global assessment of functioning indicates a mild level of difficulty with some problems in relationships, work, or school functioning.  Continue to focus on values associated associated with relationships and friendships.  Patient also has discovered by looking at some schemas that there are significant schemas associated with Christian that may be playing a role related to schemas.  Patient was encouraged to examine schemas to determine whether or not he wanted to keep the schema modified the schema, or disregarded.  Discussed with the patient different Yazidi options including a more mindful process such as Confucianism.    Therapeutic Intervention: Cognitive Behavioral Therapy      Planned Intervention: examining automatic thoughts and schemas    Objective/Observations:      Patient did not present in acute distress. Patient was appropriately groomed. Patient was alert and oriented x4. Eye contact was appropriate. No abnormalities  in attention or concentration were noted. No abnormalities of movement present; psychomotor activity was normal. Speech was fluent and regular in rhythm, rate, volume, and tone. Thought processes linear, logical and goal directed. There was no evidence of thought disorder. No auditory or visual hallucinations. Long and short term memory appeared to be intact. Insight, judgment, and impulse control were deemed to be good.  Reported mood was “comfortable.” Affect was full-ranging and appropriate to thought content and conversation.  Patient denied past and current suicidal and homicidal ideation in plan, intent, and preparatory behavior.         Diagnoses:   1. IRMA (generalized anxiety disorder)    2. Social anxiety disorder       The patient denied any suicide-related ideation and/or behaviors and intent/plan, denied thoughts about death and dying both in session and during the period since last appointment, or past 2 weeks.    Risk Level: Not Currently at Clinically Significant Risk  Hospitalization is not deemed necessary at this time as the patient does not present a clear or imminent danger to self or others. No indication for pursuing higher level of care. Outpatient management is currently most appropriate and least restrictive level of care.  Current risk:   SUICIDE: Low   Homicide: Not applicable   Self-harm: Not applicable   Relapse: Not applicable   Other:    Safety Plan reviewed? Not Indicated   If evidence of imminent risk is present, intervention/plan:         Treatment Goal(s)/Objective(s) addressed:    Anxiety   Goal: Develop strategies to reduce symptoms, or   Reduce anxiety and improve coping skills   Report feeling more positive about self and abilities during therapy sessions   Develop strategies for thought distraction when fixating on the future     Progress toward Treatment Goals: Significant improvement    Plan:    1) The patient will return to the clinic 3-4 weeks.  2) Crisis Response Plan:   Reviewed emergency resources with the patient and the patient expressed understanding including:  If feeling suicidal, patient will call or present to the [facility] during duty hours or present to closest ED ([facility] or [facility], call 911 or crisis hotline ([phone]) after duty hours.  3) Referrals/Consults:  N/A  4) Barriers to Learning:  No  5) Readiness to Learn:  Yes  6) Cultural Concerns:  No  7) Patient voiced understanding of, and agreement with, plan and goals as annotated above.  8) Declare these services are medically necessary and appropriate to the patient’s diagnosis and needs  9) The point of contact at the [facility] regarding this evaluation is Dr. Chavez, Psychologist.      Jordin Chavez III, Ed.D.

## 2020-05-24 NOTE — PROCEDURES
Interpretation:    Mr. Miller presented with excessive daytime somnolence.  He does have a history of asthma but does not have medical or sleep diagnoses that would contraindicate a home sleep test.    394 minutes of data are available for review and the information appears to be of reasonable quality for analysis.    The respiratory event index is 10.4 events per hour including hypopnea and obstructive apnea episodes with rare central apneas.  The lowest arterial oxygen saturation is 83% on room air and oxygen desaturation index of 10.6 events per hour.  He spends 1.3% of the study time, or about 5 minutes in total, with a saturation below 90%.  The average heart rate is 64 bpm.  Intermittent snoring is noted.    Assessment:  Mild obstructive sleep apnea hypopnea with a respiratory event index of 10.4 events per hour and a lowest arterial oxygen saturation of 83%.  The home sleep test does not distinguish sleep from wake time and may underestimate the severity of sleep-disordered breathing.    Recommendations:  As the patient presented with daytime somnolence treatment is probably indicated.  CPAP could be initiated through a titration polysomnogram or with the use of auto titrating CPAP. Alternative treatments for sleep-disordered breathing include reconstructive otolaryngologic surgery, dental appliances and weight loss. If any these latter treatment options are selected, a follow-up polysomnogram is suggested to assess the efficacy of therapy. Behavioral measures including avoidance of sedatives, alcohol and supine body position may be of additional benefit. Patients with severe sleep apnea are typically advised not to drive a motor vehicle or operate hazardous machinery until their daytime somnolence has been corrected.

## 2020-05-26 ENCOUNTER — APPOINTMENT (OUTPATIENT)
Dept: BEHAVIORAL HEALTH | Facility: CLINIC | Age: 20
End: 2020-05-26
Payer: COMMERCIAL

## 2020-05-26 ENCOUNTER — PATIENT MESSAGE (OUTPATIENT)
Dept: SLEEP MEDICINE | Facility: MEDICAL CENTER | Age: 20
End: 2020-05-26

## 2020-05-26 DIAGNOSIS — G47.33 OSA (OBSTRUCTIVE SLEEP APNEA): ICD-10-CM

## 2020-05-28 NOTE — PATIENT COMMUNICATION
LVM for the pt informing him that I will be faxing the order for the new cpap machine to [facility] with all the needed information. I provided pt with [facility] contact information and informed him that he should receive a call from them within 2-3 weeks but if he does not hear from I suggested him to contact the right away. I also asked the pt if he wanted to R/S his upcoming apt on 6/22/2020 @ 10:00 am with Dr. Fu because he is needing an OV for his first compliance.

## 2020-06-22 ENCOUNTER — SLEEP CENTER VISIT (OUTPATIENT)
Dept: SLEEP MEDICINE | Facility: MEDICAL CENTER | Age: 20
End: 2020-06-22
Payer: COMMERCIAL

## 2020-06-22 VITALS
HEART RATE: 73 BPM | BODY MASS INDEX: 28.77 KG/M2 | DIASTOLIC BLOOD PRESSURE: 82 MMHG | SYSTOLIC BLOOD PRESSURE: 124 MMHG | RESPIRATION RATE: 14 BRPM | OXYGEN SATURATION: 97 % | WEIGHT: 179 LBS | HEIGHT: 66 IN

## 2020-06-22 DIAGNOSIS — G47.33 OSA (OBSTRUCTIVE SLEEP APNEA): ICD-10-CM

## 2020-06-22 PROCEDURE — 99213 OFFICE O/P EST LOW 20 MIN: CPT | Performed by: FAMILY MEDICINE

## 2020-06-22 ASSESSMENT — FIBROSIS 4 INDEX: FIB4 SCORE: .5102499677469845922

## 2020-06-22 NOTE — PROGRESS NOTES
[facility] Follow Up Note     Date: 6/22/2020 / Time: 9:59 AM    Patient ID:   Name:             Mike Miller   YOB: 2000  Age:                 20 y.o.  male   MRN:               [id number]      Thank you for requesting a sleep medicine consultation on iMke Miller at the sleep center. He presents today with the chief complaints of ABBI and 1st compliance follow up.     HISTORY OF PRESENT ILLNESS:       Pt is currently on ACPAP 4-15 cm. He has the CPAP machine for last 2 weeks. Since his last visit, denies any changes in medical hx. He goes to sleep around 1 am and wakes up around 10 am. He is getting about 10 hrs of sleep on a good night and about 8 hr of sleep on a bad night. The bad nights are rare per week since he has been on the CPAP. Overall,  he does finds his sleep refreshing, the wuality of sleep has improved along with his concentration and memory. He does not take regular naps.He denies any other sleep disorder like RLS or parasomnia. He also denies usage of sleep aid.      He is using CPAP most days of the week. Pt reports 8 hrs of average nightly use of CPAP. Pt denies snoring, gasping,choking.Pt also denies significant mask leak that is interfering with sleep. The AHI is has improved to 5.9/hr. Average pressure is 5.7 to 12.3 cm. The mask leak is normal. The symptoms of ABBI has improved.       Since his last visit, he had a HST which showed Mild obstructive sleep apnea hypopnea with a respiratory event index of 10.4 events per hour and a lowest arterial oxygen saturation of 83%.  The home sleep test does not distinguish sleep from wake time and may underestimate the severity of sleep-disordered breathing. Based on the study an auto CPAP was ordered after discussing the results on my chart.    REVIEW OF SYSTEMS:       Constitutional: Denies fevers, Denies weight changes  Eyes: Denies changes in vision, no eye pain  Ears/Nose/Throat/Mouth: Denies nasal congestion or  sore throat   Cardiovascular: Denies chest pain or palpitations   Respiratory: Denies shortness of breath , Denies cough  Gastrointestinal/Hepatic: Denies abdominal pain, nausea, vomiting,   Musculoskeletal/Rheum: Denies  joint pain and swelling   Skin/Breast: Denies rash,   Neurological: Denies headache, confusion, memory loss or focal weakness/parasthesias  Psychiatric: denies mood disorder   Sleep: denies snoring     Comprehensive review of systems form is reviewed with the patient and is attached in the EMR.     PMH:  has a past medical history of Anxiety, Asthma in remission, Back pain, Cold (12/21/2017), Depression, GERD (gastroesophageal reflux disease), Panic disorder, Pneumonia, Psychiatric problem (12/2017), and Tic disorder. He also has no past medical history of Self-injurious behavior, Substance abuse (HCC), Suicide attempt (HCC), Withdrawal symptoms, alcohol (HCC), or Withdrawal symptoms, drug or narcotic (HCC).  MEDS:   Current Outpatient Medications:   •  buPROPion SR (WELLBUTRIN SR) 200 MG SR tablet, Take 1 Tab by mouth every morning., Disp: 90 Tab, Rfl: 0  •  sertraline (ZOLOFT) 100 MG Tab, Take 1 Tab by mouth every bedtime., Disp: 90 Tab, Rfl: 0  •  MELATONIN PO, Take  by mouth., Disp: , Rfl:   •  pantoprazole (PROTONIX) 40 MG Tablet Delayed Response, TAKE 1 TABLET BY MOUTH EVERY DAY, Disp: 30 Tab, Rfl: 10  •  NON SPECIFIED, OTC Adrafinil - 300 mg daily, Disp: , Rfl:   •  multivitamin (THERAGRAN) Tab, Take 1 Tab by mouth every day., Disp: , Rfl:   ALLERGIES: No Known Allergies  SURGHX:   Past Surgical History:   Procedure Laterality Date   • WRIST ORIF Left 12/22/2017    Procedure: WRIST ORIF;  Surgeon: Gabriel Smith M.D.;  Location: SURGERY [facility];  Service: Orthopedics   • EXCISION RADIAL HEAD Right     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 He smoked 0.00 packs per day. He has never used smokeless tobacco. He reports current alcohol use. He  "reports previous drug use. Frequency: 7.00 times per week. Drugs: Marijuana and Oral..  FH:   Family History   Problem Relation Age of Onset   • Hypertension Father    • Heart Disease Father         s/p CABG   • Depression Father    • Other Father         Spinal stenosis   • Depression Mother         on Wellbutrin   • Sleep Apnea Neg Hx          Physical Exam:  Vitals/ General Appearance:   Weight/BMI: Body mass index is 28.89 kg/m².  /82 (BP Location: Left arm, Patient Position: Sitting, BP Cuff Size: Adult)   Pulse 73   Resp 14   Ht 1.676 m (5' 6\")   Wt 81.2 kg (179 lb)   SpO2 97%   Vitals:    06/22/20 0959   BP: 124/82   BP Location: Left arm   Patient Position: Sitting   BP Cuff Size: Adult   Pulse: 73   Resp: 14   SpO2: 97%   Weight: 81.2 kg (179 lb)   Height: 1.676 m (5' 6\")       Pt. is alert and oriented to time, place and person. Cooperative and in no apparent distress.       Constitutional: Alert, no distress, well-groomed.  Skin: No rashes in visible areas.  Eye: Round. Conjunctiva clear, lids normal. No icterus.    Throat: Oropharynx appears crowded in that the palate is overhanging  Neck: No masses, no thyromegaly. Moves freely without pain.  -. Lungs auscultation: B/L good air entry, vesicular breath sounds, no wheezing  -. Heart auscultation: 1st and 2nd heart sounds normal, regular rhythm. No appreciable murmur.  - Extremities: no pedal edema.  - NEUROLOGICAL EXAMINATION: On neurological exam, the patient was alert and oriented x3. speech was clear and fluent without dysarthria.    ASSESSMENT AND PLAN     1. Sleep Apnea .     He is urged to avoid supine sleep, weight gain and alcoholic beverages since all of these can worsen sleep apnea. He is cautioned against drowsy driving. If He feels sleepy while driving, He must pull over for a break/nap, rather than persist on the road, in the interest of He own safety and that of others on the road.   Plan   - change pressure to ACPAP 6-15 cm with " FFM due to borderline AHI   - since it has been less than 30 days of usage, F/u in 4-8 weeks to assess the efficiacy of the recommended pressure    - compliance download was reviewed and discussed with the pt   - compliance was reinforced     2. Regarding treatment of other past medical problems and general health maintenance,  He is urged to follow up with PCP.

## 2020-07-02 ENCOUNTER — TELEMEDICINE (OUTPATIENT)
Dept: BEHAVIORAL HEALTH | Facility: CLINIC | Age: 20
End: 2020-07-02
Payer: COMMERCIAL

## 2020-07-02 DIAGNOSIS — F40.10 SOCIAL PHOBIA: ICD-10-CM

## 2020-07-02 DIAGNOSIS — F41.1 GENERALIZED ANXIETY DISORDER: ICD-10-CM

## 2020-07-02 DIAGNOSIS — F32.A DEPRESSIVE DISORDER: ICD-10-CM

## 2020-07-02 PROCEDURE — 90834 PSYTX W PT 45 MINUTES: CPT | Mod: 95,CR | Performed by: PSYCHOLOGIST

## 2020-07-03 NOTE — BH THERAPY
[facility]  Therapy Progress Note    Met with the patient per their request via (HIPPA compliant) Zoom video conference to accommodate state imposed restrictions on social contact due to the COVID-19 pandemic.    Patient Name: Mike Miller  Patient MRN: [id number]  Today's Date: 7/22020    Type of session:Individual psychotherapy  Length of session: 38 minutes  Persons in attendance:Patient    Subjective/New Info: The patient ID/Chief Complaint:  The patient is a 20 year old male, single, .  The patient was referred by psychiatry and voluntarily presented for an individual intake to address anxiety symptoms including chronic worrying and mild to moderate social anxiety with some evidence of vocal tic related to anxiety.  Reviewed limits of confidentiality and [facility] policies; patient expressed understanding and agreed to voluntarily proceed with evaluation and treatment.     Interval History:   Session Focus: The patient's estimated global assessment of functioning indicates a mild level of difficulty with some problems in relationships, work, or school functioning.  The patient reported that he was diagnosed with sleep apnea and has been using a CPAP for the past 3 weeks with significant improvement in sleep duration and quality.  The patient also reports decreased anxiety associated with the use of CPAP.  The patient denies any current concerns related to the application of CPAP.    Therapeutic Intervention: Cognitive Behavioral Therapy      Planned Intervention: examining automatic thoughts and schemas    Objective/Observations:      Patient did not present in acute distress. Patient was appropriately groomed. Patient was alert and oriented x4. Eye contact was appropriate. No abnormalities in attention or concentration were noted. No abnormalities of movement present; psychomotor activity was normal. Speech was fluent and regular in rhythm, rate, volume, and  tone. Thought processes linear, logical and goal directed. There was no evidence of thought disorder. No auditory or visual hallucinations. Long and short term memory appeared to be intact. Insight, judgment, and impulse control were deemed to be good.  Reported mood was “comfortable.” Affect was full-ranging and appropriate to thought content and conversation.  Patient denied past and current suicidal and homicidal ideation in plan, intent, and preparatory behavior.         Diagnoses:   1. IRMA (generalized anxiety disorder)    2. Social anxiety disorder    3. Depressive disorder       The patient denied any suicide-related ideation and/or behaviors and intent/plan, denied thoughts about death and dying both in session and during the period since last appointment, or past 2 weeks.    Risk Level: Not Currently at Clinically Significant Risk  Hospitalization is not deemed necessary at this time as the patient does not present a clear or imminent danger to self or others. No indication for pursuing higher level of care. Outpatient management is currently most appropriate and least restrictive level of care.  Current risk:   SUICIDE: Low   Homicide: Not applicable   Self-harm: Not applicable   Relapse: Not applicable   Other:    Safety Plan reviewed? Not Indicated   If evidence of imminent risk is present, intervention/plan:         Treatment Goal(s)/Objective(s) addressed:    Anxiety   Goal: Develop strategies to reduce symptoms, or   Reduce anxiety and improve coping skills   Report feeling more positive about self and abilities during therapy sessions   Develop strategies for thought distraction when fixating on the future     Progress toward Treatment Goals: Significant improvement    Plan:    1) The patient will return to the clinic 3-4 weeks.  2) Crisis Response Plan:  Reviewed emergency resources with the patient and the patient expressed understanding including:  If feeling suicidal, patient will call or present  to the [facility] during duty hours or present to closest ED ([facility] or [facility], call 911 or crisis hotline ([phone]) after duty hours.  3) Referrals/Consults:  N/A  4) Barriers to Learning:  No  5) Readiness to Learn:  Yes  6) Cultural Concerns:  No  7) Patient voiced understanding of, and agreement with, plan and goals as annotated above.  8) Declare these services are medically necessary and appropriate to the patient’s diagnosis and needs  9) The point of contact at the [facility] regarding this evaluation is Dr. Chavez, Psychologist.      Jordin Chavez III, Hilda.

## 2020-07-14 ENCOUNTER — TELEMEDICINE (OUTPATIENT)
Dept: BEHAVIORAL HEALTH | Facility: CLINIC | Age: 20
End: 2020-07-14
Payer: COMMERCIAL

## 2020-07-14 DIAGNOSIS — F40.10 SOCIAL PHOBIA: ICD-10-CM

## 2020-07-14 DIAGNOSIS — F41.1 GENERALIZED ANXIETY DISORDER: ICD-10-CM

## 2020-07-14 PROCEDURE — 90834 PSYTX W PT 45 MINUTES: CPT | Mod: 95,CR | Performed by: PSYCHOLOGIST

## 2020-07-15 NOTE — BH THERAPY
[facility]  Therapy Progress Note    Met with the patient per their request via (HIPPA compliant) Zoom video conference to accommodate state imposed restrictions on social contact due to the COVID-19 pandemic.    Patient Name: Mike Miller  Patient MRN: [id number]  Today's Date: 7/14/2020    Type of session:Individual psychotherapy  Length of session: 38 minutes  Persons in attendance:Patient    Subjective/New Info: The patient ID/Chief Complaint:  The patient is a 20 year old male, single, .  The patient was referred by psychiatry and voluntarily presented for an individual intake to address anxiety symptoms including chronic worrying and mild to moderate social anxiety with some evidence of vocal tic related to anxiety.  Reviewed limits of confidentiality and [facility] policies; patient expressed understanding and agreed to voluntarily proceed with evaluation and treatment.     Interval History:   Session Focus: The patient's estimated global assessment of functioning indicates a mild level of difficulty with some problems in relationships, work, or school functioning.  Continue to explore with the patient dysfunctional schemas associated with social interaction and his physical disability.  The patient is beginning the process of understanding the perception of others as it relates to his disability and how he tends to overcorrect their silence by engaging in conversations resulting in them feeling more guilty about not interacting with the patient discussed with the patient and the role that downward spiral in social interactions.    Therapeutic Intervention: Cognitive Behavioral Therapy      Planned Intervention: examining automatic thoughts and schemas    Objective/Observations:      Patient did not present in acute distress. Patient was appropriately groomed. Patient was alert and oriented x4. Eye contact was appropriate. No abnormalities in attention or  concentration were noted. No abnormalities of movement present; psychomotor activity was normal. Speech was fluent and regular in rhythm, rate, volume, and tone. Thought processes linear, logical and goal directed. There was no evidence of thought disorder. No auditory or visual hallucinations. Long and short term memory appeared to be intact. Insight, judgment, and impulse control were deemed to be good.  Reported mood was “euthymic.” Affect was full-ranging and appropriate to thought content and conversation.  Patient denied past and current suicidal and homicidal ideation in plan, intent, and preparatory behavior.         Diagnoses:   1. IRMA (generalized anxiety disorder)    2. Social anxiety disorder       The patient denied any suicide-related ideation and/or behaviors and intent/plan, denied thoughts about death and dying both in session and during the period since last appointment, or past 2 weeks.    Risk Level: Not Currently at Clinically Significant Risk  Hospitalization is not deemed necessary at this time as the patient does not present a clear or imminent danger to self or others. No indication for pursuing higher level of care. Outpatient management is currently most appropriate and least restrictive level of care.  Current risk:   SUICIDE: Low   Homicide: Not applicable   Self-harm: Not applicable   Relapse: Not applicable   Other:    Safety Plan reviewed? Not Indicated   If evidence of imminent risk is present, intervention/plan:         Treatment Goal(s)/Objective(s) addressed:    Anxiety   Goal: Develop strategies to reduce symptoms, or   Reduce anxiety and improve coping skills   Report feeling more positive about self and abilities during therapy sessions   Develop strategies for thought distraction when fixating on the future     Progress toward Treatment Goals: Significant improvement    Plan:    1) The patient will return to the clinic 3-4 weeks.  2) Crisis Response Plan:  Reviewed emergency  resources with the patient and the patient expressed understanding including:  If feeling suicidal, patient will call or present to the [facility] during duty hours or present to closest ED ([facility] or [facility], call 911 or crisis hotline ([phone]) after duty hours.  3) Referrals/Consults:  N/A  4) Barriers to Learning:  No  5) Readiness to Learn:  Yes  6) Cultural Concerns:  No  7) Patient voiced understanding of, and agreement with, plan and goals as annotated above.  8) Declare these services are medically necessary and appropriate to the patient’s diagnosis and needs  9) The point of contact at the [facility] regarding this evaluation is Dr. Chavez, Psychologist.      Jordin Chavez III, Ed.D.

## 2020-07-21 ENCOUNTER — TELEMEDICINE (OUTPATIENT)
Dept: BEHAVIORAL HEALTH | Facility: CLINIC | Age: 20
End: 2020-07-21
Payer: COMMERCIAL

## 2020-07-21 DIAGNOSIS — F41.1 GENERALIZED ANXIETY DISORDER: ICD-10-CM

## 2020-07-21 DIAGNOSIS — F40.10 SOCIAL PHOBIA: ICD-10-CM

## 2020-07-21 PROCEDURE — 90834 PSYTX W PT 45 MINUTES: CPT | Mod: 95,CR | Performed by: PSYCHOLOGIST

## 2020-07-22 NOTE — BH THERAPY
[facility]  Therapy Progress Note    Met with the patient per their request via (HIPPA compliant) Zoom video conference to accommodate state imposed restrictions on social contact due to the COVID-19 pandemic.    Patient Name: Mike Miller  Patient MRN: [id number]  Today's Date: 7/21/2020    Type of session:Individual psychotherapy  Length of session: 38 minutes  Persons in attendance:Patient    Subjective/New Info: The patient ID/Chief Complaint:  The patient is a 20 year old male, single, .  The patient was referred by psychiatry and voluntarily presented for an individual intake to address anxiety symptoms including chronic worrying and mild to moderate social anxiety with some evidence of vocal tic related to anxiety.  Reviewed limits of confidentiality and [facility] policies; patient expressed understanding and agreed to voluntarily proceed with evaluation and treatment.     Interval History:   Session Focus: The patient's estimated global assessment of functioning indicates a mild level of difficulty with some problems in relationships, work, or school functioning.  With continued exploration about himself the patient wants to define who he is within the context of his physical disability and what he values.  Use problem solving to assist the patient in identifying how the values impact the way in which he sees himself and how others may see him.  The patient is becoming aware of dysfunctional schemas associated with interactions.    Therapeutic Intervention: Cognitive Behavioral Therapy      Planned Intervention: examining automatic thoughts and schemas    Objective/Observations:      Patient did not present in acute distress. Patient was appropriately groomed. Patient was alert and oriented x4. Eye contact was appropriate. No abnormalities in attention or concentration were noted. No abnormalities of movement present; psychomotor activity was normal. Speech  was fluent and regular in rhythm, rate, volume, and tone. Thought processes linear, logical and goal directed. There was no evidence of thought disorder. No auditory or visual hallucinations. Long and short term memory appeared to be intact. Insight, judgment, and impulse control were deemed to be good.  Reported mood was “comfortable.” Affect was full-ranging and appropriate to thought content and conversation.  Patient denied past and current suicidal and homicidal ideation in plan, intent, and preparatory behavior.         Diagnoses:   1. IRMA (generalized anxiety disorder)    2. Social anxiety disorder       The patient denied any suicide-related ideation and/or behaviors and intent/plan, denied thoughts about death and dying both in session and during the period since last appointment, or past 2 weeks.    Risk Level: Not Currently at Clinically Significant Risk  Hospitalization is not deemed necessary at this time as the patient does not present a clear or imminent danger to self or others. No indication for pursuing higher level of care. Outpatient management is currently most appropriate and least restrictive level of care.  Current risk:   SUICIDE: Low   Homicide: Not applicable   Self-harm: Not applicable   Relapse: Not applicable   Other:    Safety Plan reviewed? Not Indicated   If evidence of imminent risk is present, intervention/plan:         Treatment Goal(s)/Objective(s) addressed:    Anxiety   Goal: Develop strategies to reduce symptoms, or   Reduce anxiety and improve coping skills   Report feeling more positive about self and abilities during therapy sessions   Develop strategies for thought distraction when fixating on the future     Progress toward Treatment Goals: Significant improvement    Plan:    1) The patient will return to the clinic 2-3 weeks.  2) Crisis Response Plan:  Reviewed emergency resources with the patient and the patient expressed understanding including:  If feeling suicidal,  patient will call or present to the [facility] during duty hours or present to closest ED ([facility] or [facility], call 911 or crisis hotline ([phone]) after duty hours.  3) Referrals/Consults:  N/A  4) Barriers to Learning:  No  5) Readiness to Learn:  Yes  6) Cultural Concerns:  No  7) Patient voiced understanding of, and agreement with, plan and goals as annotated above.  8) Declare these services are medically necessary and appropriate to the patient’s diagnosis and needs  9) The point of contact at the [facility] regarding this evaluation is Dr. Chavez, Psychologist.      Jordin Chavez III, Ed.D.

## 2020-07-28 ENCOUNTER — TELEMEDICINE (OUTPATIENT)
Dept: BEHAVIORAL HEALTH | Facility: CLINIC | Age: 20
End: 2020-07-28
Payer: COMMERCIAL

## 2020-07-28 DIAGNOSIS — F40.10 SOCIAL PHOBIA: ICD-10-CM

## 2020-07-28 DIAGNOSIS — F41.1 GENERALIZED ANXIETY DISORDER: ICD-10-CM

## 2020-07-28 PROCEDURE — 90834 PSYTX W PT 45 MINUTES: CPT | Mod: 95,CR | Performed by: PSYCHOLOGIST

## 2020-07-29 NOTE — BH THERAPY
[facility]  Therapy Progress Note    Met with the patient per their request via (HIPPA compliant) Zoom video conference to accommodate state imposed restrictions on social contact due to the COVID-19 pandemic.    Patient Name: Mike Miller  Patient MRN: [id number]  Today's Date: 7/28/2020    Type of session:Individual psychotherapy  Length of session: 38 minutes  Persons in attendance:Patient    Subjective/New Info: The patient ID/Chief Complaint:  The patient is a 20 year old male, single, .  The patient was referred by psychiatry and voluntarily presented for an individual intake to address anxiety symptoms including chronic worrying and mild to moderate social anxiety with some evidence of vocal tic related to anxiety.  Reviewed limits of confidentiality and [facility] policies; patient expressed understanding and agreed to voluntarily proceed with evaluation and treatment.     Interval History:   Session Focus: The patient's estimated global assessment of functioning indicates a mild level of difficulty with some problems in relationships and work functioning.  Patient is increasing his understanding of both dysfunctional schemas and healthy schemas.  Discussed with the patient the concept utilization of the schema being whole.  The patient is struggling with why girls did not necessarily like him but also readily recognizes that the group of individuals that he tends to associate with were not necessarily the type of people who are good for him and allowed him to be himself.    Therapeutic Intervention: Cognitive Behavioral Therapy      Planned Intervention: Examining automatic thoughts and schemas    Objective/Observations:      Patient did not present in acute distress. Patient was appropriately groomed. Patient was alert and oriented x4. Eye contact was appropriate. No abnormalities in attention or concentration were noted. No abnormalities of movement present;  psychomotor activity was normal. Speech was fluent and regular in rhythm, rate, volume, and tone. Thought processes linear, logical and goal directed. There was no evidence of thought disorder. No auditory or visual hallucinations. Long and short term memory appeared to be intact. Insight, judgment, and impulse control were deemed to be good.  Reported mood was “comfortable.” Affect was full-ranging and appropriate to thought content and conversation.  Patient denied past and current suicidal and homicidal ideation in plan, intent, and preparatory behavior.         Diagnoses:   1. IRMA (generalized anxiety disorder)    2. Social anxiety disorder       The patient denied any suicide-related ideation and/or behaviors and intent/plan, denied thoughts about death and dying both in session and during the period since last appointment, or past 2 weeks.    Risk Level: Not Currently at Clinically Significant Risk  Hospitalization is not deemed necessary at this time as the patient does not present a clear or imminent danger to self or others. No indication for pursuing higher level of care. Outpatient management is currently most appropriate and least restrictive level of care.  Current risk:   SUICIDE: Low   Homicide: Not applicable   Self-harm: Not applicable   Relapse: Not applicable   Other:    Safety Plan reviewed? Not Indicated   If evidence of imminent risk is present, intervention/plan:         Treatment Goal(s)/Objective(s) addressed:    Anxiety   Goal: Develop strategies to reduce symptoms, or   Reduce anxiety and improve coping skills   Report feeling more positive about self and abilities during therapy sessions   Develop strategies for thought distraction when fixating on the future     Progress toward Treatment Goals: Significant improvement    Plan:    1) The patient will return to the clinic 2-3 weeks.  2) Crisis Response Plan:  Reviewed emergency resources with the patient and the patient expressed  understanding including:  If feeling suicidal, patient will call or present to the [facility] during duty hours or present to closest ED ([facility] or [facility], call 911 or crisis hotline ([phone]) after duty hours.  3) Referrals/Consults:  N/A  4) Barriers to Learning:  No  5) Readiness to Learn:  Yes  6) Cultural Concerns:  No  7) Patient voiced understanding of, and agreement with, plan and goals as annotated above.  8) Declare these services are medically necessary and appropriate to the patient’s diagnosis and needs  9) The point of contact at the [facility] regarding this evaluation is Dr. Chavez, Psychologist.      Jordin Chavez III, Ed.D.

## 2020-08-03 ENCOUNTER — OFFICE VISIT (OUTPATIENT)
Dept: MEDICAL GROUP | Facility: MEDICAL CENTER | Age: 20
End: 2020-08-03
Payer: COMMERCIAL

## 2020-08-03 VITALS
BODY MASS INDEX: 28.09 KG/M2 | HEART RATE: 77 BPM | OXYGEN SATURATION: 95 % | RESPIRATION RATE: 16 BRPM | DIASTOLIC BLOOD PRESSURE: 70 MMHG | WEIGHT: 179 LBS | HEIGHT: 67 IN | SYSTOLIC BLOOD PRESSURE: 132 MMHG

## 2020-08-03 DIAGNOSIS — Z23 NEED FOR HPV VACCINE: ICD-10-CM

## 2020-08-03 DIAGNOSIS — G47.33 OSA ON CPAP: ICD-10-CM

## 2020-08-03 DIAGNOSIS — K21.9 GASTROESOPHAGEAL REFLUX DISEASE WITHOUT ESOPHAGITIS: ICD-10-CM

## 2020-08-03 DIAGNOSIS — L70.0 ACNE VULGARIS: ICD-10-CM

## 2020-08-03 DIAGNOSIS — Z23 NEED FOR TDAP VACCINATION: ICD-10-CM

## 2020-08-03 PROCEDURE — 90471 IMMUNIZATION ADMIN: CPT | Performed by: NURSE PRACTITIONER

## 2020-08-03 PROCEDURE — 99214 OFFICE O/P EST MOD 30 MIN: CPT | Mod: 25 | Performed by: NURSE PRACTITIONER

## 2020-08-03 PROCEDURE — 90651 9VHPV VACCINE 2/3 DOSE IM: CPT | Performed by: NURSE PRACTITIONER

## 2020-08-03 PROCEDURE — 90472 IMMUNIZATION ADMIN EACH ADD: CPT | Performed by: NURSE PRACTITIONER

## 2020-08-03 PROCEDURE — 90715 TDAP VACCINE 7 YRS/> IM: CPT | Performed by: NURSE PRACTITIONER

## 2020-08-03 RX ORDER — DOXYCYCLINE HYCLATE 100 MG
100 TABLET ORAL 2 TIMES DAILY
Qty: 60 TAB | Refills: 11 | Status: SHIPPED | OUTPATIENT
Start: 2020-08-03 | End: 2020-09-28

## 2020-08-03 RX ORDER — PANTOPRAZOLE SODIUM 40 MG/1
40 TABLET, DELAYED RELEASE ORAL
Qty: 30 TAB | Refills: 10 | Status: SHIPPED | OUTPATIENT
Start: 2020-08-03 | End: 2021-04-14

## 2020-08-03 ASSESSMENT — FIBROSIS 4 INDEX: FIB4 SCORE: .5102499677469845922

## 2020-08-04 ENCOUNTER — TELEPHONE (OUTPATIENT)
Dept: MEDICAL GROUP | Facility: MEDICAL CENTER | Age: 20
End: 2020-08-04

## 2020-08-14 ENCOUNTER — TELEPHONE (OUTPATIENT)
Dept: DERMATOLOGY | Facility: IMAGING CENTER | Age: 20
End: 2020-08-14

## 2020-08-14 ENCOUNTER — APPOINTMENT (OUTPATIENT)
Dept: BEHAVIORAL HEALTH | Facility: CLINIC | Age: 20
End: 2020-08-14
Payer: COMMERCIAL

## 2020-08-18 ENCOUNTER — APPOINTMENT (OUTPATIENT)
Dept: BEHAVIORAL HEALTH | Facility: CLINIC | Age: 20
End: 2020-08-18
Payer: COMMERCIAL

## 2020-08-20 ENCOUNTER — TELEMEDICINE (OUTPATIENT)
Dept: SLEEP MEDICINE | Facility: MEDICAL CENTER | Age: 20
End: 2020-08-20
Payer: COMMERCIAL

## 2020-08-20 VITALS — BODY MASS INDEX: 27.47 KG/M2 | HEIGHT: 67 IN | WEIGHT: 175 LBS

## 2020-08-20 DIAGNOSIS — G47.33 OSA ON CPAP: ICD-10-CM

## 2020-08-20 PROCEDURE — 99213 OFFICE O/P EST LOW 20 MIN: CPT | Mod: 95,CR | Performed by: NURSE PRACTITIONER

## 2020-08-20 ASSESSMENT — FIBROSIS 4 INDEX: FIB4 SCORE: .5102499677469845922

## 2020-08-20 NOTE — PROGRESS NOTES
Virtual Visit: Established Patient   This visit was conducted via Zoom  using secure and encrypted videoconferencing technology. The patient was in a private location in the state [facility].    The patient's identity was confirmed and verbal consent was obtained for this virtual visit.  Given the importance of social distancing and other strategies recommended to reduce the risk of COVID-19 transmission, I am providing medical care to this patient via audio/video visit in place of an in person visit at the request of the patient.  Subjective:   CC:   Chief Complaint   Patient presents with   • Follow-Up     Apnea Last seen 06/22/2020       Mike Miller is a 20 y.o. male presenting for evaluation and management of ABBI. PMH includes congenital limb deformity, mild asthma, depression, social anxiety disorder, generalized anxiety disorder, ABBI.    HST from 5/14/20 indicated mild obstructive sleep apnea hypopnea with a respiratory event index of 10.4 events per hour and a lowest arterial oxygen saturation of 83%.  The home sleep test does not distinguish sleep from wake time and may underestimate the severity of sleep-disordered breathing. Based on the study an auto CPAP was ordered after discussing the results on my chart.    Compliance report from 7/21/20-8/19/20 was downloaded and reviewed with the patient which showed autoCPAP 6-15 cmH2O, 100% compliance, 7 hrs 13 min use, AHI of 2.4. Mask leak is very minimal. He is tolerating the pressure and mask well. He goes to bed at 10 pm and wakes up at 6 am. Overall,  he does finds his sleep much more refreshing, the quality of sleep has improved along with his concentration and memory since he has been on CPAP therapy. He does not take regular naps.  He finds it much easier to wake up in the morning and is not as tired.  He will occasionally still have some days where he does feel fatigued but this has greatly improved.  He denies morning headache or snoring.  He  denies any new health problems or medications.    ROS   Denies any recent fevers or chills. No nausea or vomiting. No chest pains or shortness of breath.     No Known Allergies    Current medicines (including changes today)  Current Outpatient Medications   Medication Sig Dispense Refill   • buPROPion (WELLBUTRIN SR) 200 MG SR tablet Take 1 Tab by mouth every day. 30 day prescription to cover until follow up appointment 30 Tab 0   • doxycycline (VIBRAMYCIN) 100 MG Tab Take 1 Tab by mouth 2 times a day. 60 Tab 11   • pantoprazole (PROTONIX) 40 MG Tablet Delayed Response Take 1 Tab by mouth every day. 30 Tab 10   • sertraline (ZOLOFT) 100 MG Tab Take 1 Tab by mouth every bedtime. 90 Tab 0   • NON SPECIFIED OTC Adrafinil - 300 mg daily     • MELATONIN PO Take  by mouth.     • multivitamin (THERAGRAN) Tab Take 1 Tab by mouth every day.       No current facility-administered medications for this visit.        Patient Active Problem List    Diagnosis Date Noted   • ABBI on CPAP 06/22/2020   • IRMA (generalized anxiety disorder) 04/16/2018   • Social anxiety disorder 04/16/2018   • Obesity (BMI 30-39.9) 03/14/2018   • Depressive disorder 03/06/2017   • Congenital limb deformity 03/30/2016   • Mild intermittent asthma without complication 03/30/2016   • Acne vulgaris 03/28/2016       Family History   Problem Relation Age of Onset   • Hypertension Father    • Heart Disease Father         s/p CABG   • Depression Father    • Other Father         Spinal stenosis   • Depression Mother         on Wellbutrin   • Sleep Apnea Neg Hx        He  has a past medical history of Anxiety, Asthma in remission, Back pain, Cold (12/21/2017), Depression, GERD (gastroesophageal reflux disease), Panic disorder, Pneumonia, Psychiatric problem (12/2017), and Tic disorder. He also has no past medical history of Self-injurious behavior, Substance abuse (HCC), Suicide attempt (HCC), Withdrawal symptoms, alcohol (HCC), or Withdrawal symptoms, drug or  "narcotic (HCC).  He  has a past surgical history that includes excision radial head (Right) and wrist orif (Left, 12/22/2017).       Objective:   Ht 1.702 m (5' 7\")   Wt 79.4 kg (175 lb)   BMI 27.41 kg/m²     Physical Exam:  Constitutional: Alert, no distress, well-groomed.  Skin: No rashes in visible areas.  Eye: Round. Conjunctiva clear, lids normal. No icterus.   ENMT: Lips pink without lesions, good dentition, moist mucous membranes. Phonation normal.  Neck: Moves freely without pain.  Respiratory: Unlabored respiratory effort, no cough or audible wheeze  Psych: Alert and oriented x3, normal affect and mood.       Assessment and Plan:   The following treatment plan was discussed:     1. ABBI on CPAP  - DME Mask and Supplies    2. BMI 27.0-27.9,adult      Discussed the cardiovascular and neuropsychiatric risks of untreated ABBI; including but not limited to: HTN, DM, MI, ASCVD, CVA, CHF, traffic accidents.     1. Compliance report from 7/21/20-8/19/20 was downloaded and reviewed with the patient which showed autoCPAP 6-15 cmH2O, 100% compliance, 7 hrs 13 min use, AHI of 2.4. Mask leak is very minimal. Continue autoCPAP. Patient is compliant and benefiting from autoCPAP therapy for management of ABBI.   2. DME order ([employer]) for mask (FFM or MOC) and supplies was provided today. Continue to clean mask and supplies weekly, and change supplies per insurance guidelines.   3. Continue to stay active.   4. Follow up with the appropriate healthcare practitioners for all other medical problems and issues.  5. Sleep hygiene discussed.    Follow-up: Return in about 1 year (around 8/20/2021).or sooner if needed.     PHILL Castano.    This dictation was created using voice recognition software. The accuracy of the dictation is limited to the abilities of the software. I expect there may be some errors of grammar and possibly content.            "

## 2020-08-25 ENCOUNTER — TELEMEDICINE (OUTPATIENT)
Dept: BEHAVIORAL HEALTH | Facility: CLINIC | Age: 20
End: 2020-08-25
Payer: COMMERCIAL

## 2020-08-25 DIAGNOSIS — F41.1 GENERALIZED ANXIETY DISORDER: ICD-10-CM

## 2020-08-25 DIAGNOSIS — F40.10 SOCIAL PHOBIA: ICD-10-CM

## 2020-08-25 PROCEDURE — 90834 PSYTX W PT 45 MINUTES: CPT | Mod: 95,CR | Performed by: PSYCHOLOGIST

## 2020-08-25 NOTE — BH THERAPY
[facility]  Therapy Progress Note    Met with the patient per their request via (HIPPA compliant) Zoom video conference to accommodate state imposed restrictions on social contact due to the COVID-19 pandemic.    Patient Name: Mike Miller  Patient MRN: [id number]  Today's Date: 8/25/2020    Type of session:Individual psychotherapy  Length of session: 38 minutes  Persons in attendance:Patient    Subjective/New Info: The patient ID/Chief Complaint:  The patient is a 20 year old male, single, .  The patient was referred by psychiatry and voluntarily presented for an individual intake to address anxiety symptoms including chronic worrying and mild to moderate social anxiety with some evidence of vocal tic related to anxiety.  Reviewed limits of confidentiality and [facility] policies; patient expressed understanding and agreed to voluntarily proceed with evaluation and treatment.     Interval History:   Session Focus: The patient's estimated global assessment of functioning indicates a mild level of difficulty with some problems in relationships and work functioning.  The patient reports that he is working through differentiation between his mother and father with some success.  The patient does report some mild increase in anxiety symptoms but recognizes that some of the anxieties associated with his fear of being more narcissistic in intimate relationships which is something he wants to avoid as a result of exposure to his father's pattern and narcissistic behavior.  The patient is excited that he is going to be moving out in the next few weeks.    Therapeutic Intervention: Cognitive Behavioral Therapy      Planned Intervention: Examining automatic thoughts and schemas    Objective/Observations:      Patient did not present in acute distress. Patient was appropriately groomed. Patient was alert and oriented x4. Eye contact was appropriate. No abnormalities in attention  or concentration were noted. No abnormalities of movement present; psychomotor activity was normal. Speech was fluent and regular in rhythm, rate, volume, and tone. Thought processes linear, logical and goal directed. There was no evidence of thought disorder. No auditory or visual hallucinations. Long and short term memory appeared to be intact. Insight, judgment, and impulse control were deemed to be good.  Reported mood was “comfortable.” Affect was full-ranging and appropriate to thought content and conversation.  Patient denied past and current suicidal and homicidal ideation in plan, intent, and preparatory behavior.         Diagnoses:   1. IRMA (generalized anxiety disorder)    2. Social anxiety disorder       The patient denied any suicide-related ideation and/or behaviors and intent/plan, denied thoughts about death and dying both in session and during the period since last appointment, or past 2 weeks.    Risk Level: Not Currently at Clinically Significant Risk  Hospitalization is not deemed necessary at this time as the patient does not present a clear or imminent danger to self or others. No indication for pursuing higher level of care. Outpatient management is currently most appropriate and least restrictive level of care.  Current risk:   SUICIDE: Low   Homicide: Not applicable   Self-harm: Not applicable   Relapse: Not applicable   Other:    Safety Plan reviewed? Not Indicated   If evidence of imminent risk is present, intervention/plan:         Treatment Goal(s)/Objective(s) addressed:    Anxiety   Goal: Develop strategies to reduce symptoms, or   Reduce anxiety and improve coping skills   Report feeling more positive about self and abilities during therapy sessions   Develop strategies for thought distraction when fixating on the future     Progress toward Treatment Goals: Significant improvement    Plan:    1) The patient will return to the clinic 2-3 weeks.  2) Crisis Response Plan:  Reviewed  emergency resources with the patient and the patient expressed understanding including:  If feeling suicidal, patient will call or present to the [facility] during duty hours or present to closest ED ([facility] or [facility], call 911 or crisis hotline ([phone]-BLSY) after duty hours.  3) Referrals/Consults:  N/A  4) Barriers to Learning:  No  5) Readiness to Learn:  Yes  6) Cultural Concerns:  No  7) Patient voiced understanding of, and agreement with, plan and goals as annotated above.  8) Declare these services are medically necessary and appropriate to the patient’s diagnosis and needs  9) The point of contact at the [facility] regarding this evaluation is Dr. Chavez, Psychologist.      Jordin Chavez III, Ed.D.

## 2020-09-01 ENCOUNTER — TELEMEDICINE (OUTPATIENT)
Dept: BEHAVIORAL HEALTH | Facility: CLINIC | Age: 20
End: 2020-09-01
Payer: COMMERCIAL

## 2020-09-01 VITALS — BODY MASS INDEX: 27.47 KG/M2 | HEIGHT: 67 IN | WEIGHT: 175 LBS

## 2020-09-01 DIAGNOSIS — F41.1 GENERALIZED ANXIETY DISORDER: ICD-10-CM

## 2020-09-01 DIAGNOSIS — F32.A DEPRESSIVE DISORDER: ICD-10-CM

## 2020-09-01 DIAGNOSIS — F40.10 SOCIAL PHOBIA: ICD-10-CM

## 2020-09-01 PROCEDURE — 99214 OFFICE O/P EST MOD 30 MIN: CPT | Mod: 95,CR | Performed by: PSYCHIATRY & NEUROLOGY

## 2020-09-01 RX ORDER — SERTRALINE HYDROCHLORIDE 100 MG/1
100 TABLET, FILM COATED ORAL
Qty: 90 TAB | Refills: 0 | Status: SHIPPED | OUTPATIENT
Start: 2020-09-01 | End: 2020-12-03

## 2020-09-01 RX ORDER — BUPROPION HYDROCHLORIDE 150 MG/1
150 TABLET, EXTENDED RELEASE ORAL EVERY MORNING
Qty: 90 TAB | Refills: 0 | Status: SHIPPED | OUTPATIENT
Start: 2020-09-01 | End: 2020-12-21

## 2020-09-01 ASSESSMENT — FIBROSIS 4 INDEX: FIB4 SCORE: .5102499677469845922

## 2020-09-01 NOTE — PROGRESS NOTES
PSYCHIATRY VIRTUAL VISIT FOLLOW-UP NOTE      Chief Complaint   Patient presents with   • Follow-Up     depression, anxiety       This evaluation was conducted via Zoom using secure and encrypted videoconferencing technology. The patient was in a private location in the [facility].    The patient's identity was confirmed and verbal consent was obtained for this virtual visit.    History Of Present Illness:  Mike Miller is a 20 y.o. old male with depressive disorder, generalized anxiety disorder, social anxiety disorder, sleep apnea on CPAP comes in today for follow up, was last seen over 3 months ago.  He has been doing all right in regards to his anxiety and depression in the last few months.  He was diagnosed with sleep apnea and for the last 2 months has been using CPAP which has greatly improved his fatigue.  He is getting better quality of sleep and is waking up feeling more energetic.  He has stopped taking his supplements for energy and is no longer using melatonin as well.  He has been taking the higher dose of Wellbutrin but notices increase in his anxiety and irritability at times.  He feels that Zoloft helps more with his anxiety symptoms compared to Wellbutrin.  He still struggles with anxiety and racing thoughts which she feels adds to his irritability.  He denies any symptoms of depression.  He has been working 2 jobs and has been doing well at both places.  He has been going to the gym on a regular basis as well.  He denies having thoughts of wanting to hurt himself or others.    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    Substance Use:  Alcohol - Infrequent alcohol use   Nicotine - Denies  Illicit drugs - CBD oil drops twice daily for anxiety    Past Medication Trials:  None     Medications:  Current Outpatient Medications   Medication Sig Dispense Refill   • buPROPion (WELLBUTRIN SR) 200 MG  "SR tablet Take 1 Tab by mouth every day. 30 day prescription to cover until follow up appointment 30 Tab 0   • doxycycline (VIBRAMYCIN) 100 MG Tab Take 1 Tab by mouth 2 times a day. 60 Tab 11   • pantoprazole (PROTONIX) 40 MG Tablet Delayed Response Take 1 Tab by mouth every day. 30 Tab 10   • NON SPECIFIED OTC Adrafinil - 300 mg daily     • sertraline (ZOLOFT) 100 MG Tab Take 1 Tab by mouth every bedtime. 90 Tab 0   • MELATONIN PO Take  by mouth.     • multivitamin (THERAGRAN) Tab Take 1 Tab by mouth every day.       No current facility-administered medications for this visit.        Review Of Systems:    Constitutional - Negative for fatigue  Respiratory - Negative for shortness of breath, cough  CVS - Negative for chest pain, palpitations  GI - Negative for nausea, vomiting, abdominal pain, diarrhea, constipation  Musculoskeletal - Negative for back pain  Neurological - Negative for headaches  Skin - Positive for acne  Psychiatric - Negative for depression, sleep problems.  Positive for anxiety    Physical Examination:  Vital signs: Ht 1.702 m (5' 7\")   Wt 79.4 kg (175 lb)   BMI 27.41 kg/m²     Musculoskeletal: No abnormal movements.     Mental Status Evaluation:   General: Young male, dressed in casual attire, good grooming and hygiene, in no apparent distress, calm and cooperative, good eye contact, no psychomotor agitation or retardation   Orientation: Alert and oriented to person, place and time  Recent and remote memory: Grossly intact  Attention span and concentration: Grossly intact  Speech: Spontaneous, normal rate, rhythm and tone  Thought Process: Linear, logical and goal directed  Thought Content: Denies suicidal or homicidal ideations, intent or plan  Perception: Denies auditory or visual hallucinations. No delusions noted  Associations: Intact  Language: Appropriate  Fund of knowledge and vocabulary: Grossly adequate  Mood: \"good\"  Affect: Euthymic, mood congruent  Insight: Good  Judgment: " Good    Depression screening:  Depression Screen (PHQ-2/PHQ-9) 3/27/2019 10/9/2019 2/10/2020   PHQ-2 Total Score - - 2   PHQ-2 Total Score - - -   PHQ-2 Total Score 0 0 -   PHQ-9 Total Score - - 13     Interpretation of PHQ-9 Total Score   Score Severity   1-4 No Depression   5-9 Mild Depression   10-14 Moderate Depression   15-19 Moderately Severe Depression   20-27 Severe Depression    Medical Records/Labs/Diagnostic Tests Reviewed:  [place]  records - no prescribed controlled medications found in the last 1 year      Impression:  1.  Generalized anxiety disorder - stable  2.  Social anxiety disorder - stable  3.  Depressive disorder, unspecified type - stable    Plan:  1.  Continue Zoloft 100 mg daily bedtime for anxiety and depression  2.  Decrease Wellbutrin SR to 150 mg in the morning for anxiety and depression.  He is feeling better in regards to his fatigue and the higher dose as to his irritability and he is willing to try a lower dose.  3.  Continue individual psychotherapy with Dr. Jordin Chavez, Ed.D    Return to clinic in 3 months or sooner if symptoms worsen    The proposed treatment plan was discussed with the patient who was provided the opportunity to ask questions and make suggestions regarding alternative treatment. Patient verbalized understanding and expressed agreement with the plan.     Leona Emerson M.D.  09/01/20    This note was created using voice recognition software (Dragon). The accuracy of the dictation is limited by the abilities of the software. I have reviewed the note prior to signing, however some errors in grammar and context are still possible. If you have any questions related to this note please do not hesitate to contact our office.

## 2020-09-03 ENCOUNTER — APPOINTMENT (OUTPATIENT)
Dept: BEHAVIORAL HEALTH | Facility: CLINIC | Age: 20
End: 2020-09-03
Payer: COMMERCIAL

## 2020-09-16 ENCOUNTER — OFFICE VISIT (OUTPATIENT)
Dept: DERMATOLOGY | Facility: IMAGING CENTER | Age: 20
End: 2020-09-16
Payer: COMMERCIAL

## 2020-09-16 DIAGNOSIS — L70.0 ACNE VULGARIS: ICD-10-CM

## 2020-09-16 DIAGNOSIS — Z79.899 ENCOUNTER FOR LONG-TERM CURRENT USE OF HIGH RISK MEDICATION: ICD-10-CM

## 2020-09-16 PROCEDURE — 99214 OFFICE O/P EST MOD 30 MIN: CPT | Performed by: NURSE PRACTITIONER

## 2020-09-16 ASSESSMENT — ENCOUNTER SYMPTOMS
CHILLS: 0
DIAPHORESIS: 0
FEVER: 0
WEIGHT LOSS: 0

## 2020-09-16 NOTE — PROGRESS NOTES
"Dermatology New Patient Visit    Chief Complaint   Patient presents with   • Acne       Subjective:     HPI:   Mike Miller is a 20 y.o. male presenting for    Acne  Started: 9 years of age  Active on: face  Aggravated by: touching face, has to bathe and wash hair every night  Treatment currently used: doxycycline, OTC moisturizer  Prior treatments used: None  Face wash/moisturizer: OTC  Family history of scarring acne: Paternal uncle      Past Medical History:   Diagnosis Date   • Anxiety    • Asthma in remission    • Back pain    • Cold 12/21/2017    \"I had a head cold about 2 weeks ago\"  denies SOB, productive cough   • Depression    • GERD (gastroesophageal reflux disease)    • Panic disorder    • Pneumonia    • Psychiatric problem 12/2017    depression   • Tic disorder        Current Outpatient Medications on File Prior to Visit   Medication Sig Dispense Refill   • buPROPion SR (WELLBUTRIN-SR) 150 MG TABLET SR 12 HR sustained-release tablet Take 1 Tab by mouth every morning. 90 Tab 0   • sertraline (ZOLOFT) 100 MG Tab Take 1 Tab by mouth every bedtime. 90 Tab 0   • doxycycline (VIBRAMYCIN) 100 MG Tab Take 1 Tab by mouth 2 times a day. 60 Tab 11   • pantoprazole (PROTONIX) 40 MG Tablet Delayed Response Take 1 Tab by mouth every day. 30 Tab 10     No current facility-administered medications on file prior to visit.        No Known Allergies    Family History   Problem Relation Age of Onset   • Hypertension Father    • Heart Disease Father         s/p CABG   • Depression Father    • Other Father         Spinal stenosis   • Depression Mother         on Wellbutrin   • Sleep Apnea Neg Hx        Social History     Socioeconomic History   • Marital status: Single     Spouse name: Not on file   • Number of children: Not on file   • Years of education: Not on file   • Highest education level: Not on file   Occupational History   • Not on file   Social Needs   • Financial resource strain: Not on file   • Food " insecurity     Worry: Not on file     Inability: Not on file   • Transportation needs     Medical: Not on file     Non-medical: Not on file   Tobacco Use   • Smoking status: Former Smoker     Packs/day: 0.00   • Smokeless tobacco: Never Used   Substance and Sexual Activity   • Alcohol use: Yes     Alcohol/week: 0.0 oz     Comment: infrequent   • Drug use: Yes     Frequency: 7.0 times per week     Types: Marijuana, Oral     Comment: CBD drops    • Sexual activity: Not Currently     Partners: Female   Lifestyle   • Physical activity     Days per week: Not on file     Minutes per session: Not on file   • Stress: Not on file   Relationships   • Social connections     Talks on phone: Not on file     Gets together: Not on file     Attends [employer] service: Not on file     Active member of club or organization: Not on file     Attends meetings of clubs or organizations: Not on file     Relationship status: Not on file   • Intimate partner violence     Fear of current or ex partner: Not on file     Emotionally abused: Not on file     Physically abused: Not on file     Forced sexual activity: Not on file   Other Topics Concern   • Behavioral problems Not Asked   • Interpersonal relationships Not Asked   • Sad or not enjoying activities Not Asked   • Suicidal thoughts Not Asked   • Poor school performance Not Asked   • Reading difficulties Not Asked   • Speech difficulties Not Asked   • Writing difficulties Not Asked   • Inadequate sleep Not Asked   • Excessive TV viewing Not Asked   • Excessive video game use Not Asked   • Inadequate exercise Not Asked   • Sports related Not Asked   • Poor diet Not Asked   • Family concerns for drug/alcohol abuse Not Asked   • Poor oral hygiene Not Asked   • Bike safety Not Asked   • Family concerns vehicle safety Not Asked   Social History Narrative   • Not on file       Review of Systems   Constitutional: Negative for chills, diaphoresis, fever, malaise/fatigue and weight loss.   Skin:  Negative for itching and rash.        Objective:     A focused cutaneous exam was completed including: face, lips and neck with the following pertinent findings listed below. Remaining above-listed examined areas within normal limits / negative for rashes or lesions.        There were no vitals taken for this visit.    Physical Exam   Constitutional: He is oriented to person, place, and time and well-developed, well-nourished, and in no distress. No distress.   HENT:   Head: Normocephalic.   numerous erythematous papules, numerous pustules, numerous open and closed comedones to forehead primarily and nose cheeks and chin. pih and some pitting scarring noted    Chest and back are spared     Pulmonary/Chest: Effort normal.   Neurological: He is alert and oriented to person, place, and time.   Skin: Skin is warm and dry. No rash noted. There is erythema.   Psychiatric: Mood normal.       DATA: none applicable to review    Assessment and Plan:     Acne vulgaris - severe, inflammatory, scaring  - educated patient about diagnosis, management options, and expectations of treatment  - given lack of response/clearance with prior treatment, I discussed options of isotretinoin.    - Introductory packet for isotretinoin treatment provided and reviewed.    I extensively discussed the potential adverse effects of treatment, including, but not limited to, teratogenicity, ocular disturbances, bony abnormailities, lipid disturbances, elevated LFTs/liver inflammation, reported associations with IBD, depression, endocrine disturbances, hematologic abnormalities, and myopathy. More common side effects of dry skin/eyes/mucosa, photosensitivity, sticky skin, hair loss, fragile nails, paronychia, myalgias, fatigue, headache, and abdominal pain/nausea/diarrhea were also reviewed.IPatient is aware he cannot share the medication, and he cannot donate blood during treatment, and through one month after completion of treatment.  - we discussed  the need for baseline blood work, and follow-up blood work once treatment is initiated    - pt aware to notify me asap if any new/concerning symptoms  - lubricating eye drops, nasal spray, vaseline  - dose: isotretinoin 80mg daily (divided doses)   - ipledge ID: [id number]  Goal dose: 120-150mg/kg; 79.4 kg = 9528mg-18995ku; total cumulative dose on day of evaluation: 0mg      2. Encounter for long-term current use of high risk medication    - Lipid Profile  - ALANINE AMINO-TRANS  - ASPARTATE AMINO-FARRIS      Followup: Return for one month after initiation of isotretinoin .    PHILL Puckett.

## 2020-09-17 ENCOUNTER — TELEPHONE (OUTPATIENT)
Dept: DERMATOLOGY | Facility: IMAGING CENTER | Age: 20
End: 2020-09-17

## 2020-09-24 ENCOUNTER — HOSPITAL ENCOUNTER (OUTPATIENT)
Dept: LAB | Facility: MEDICAL CENTER | Age: 20
End: 2020-09-24
Attending: NURSE PRACTITIONER
Payer: COMMERCIAL

## 2020-09-24 LAB
ALT SERPL-CCNC: 17 U/L (ref 2–50)
AST SERPL-CCNC: 18 U/L (ref 12–45)
CHOLEST SERPL-MCNC: 175 MG/DL (ref 100–199)
FASTING STATUS PATIENT QL REPORTED: NORMAL
HDLC SERPL-MCNC: 50 MG/DL
LDLC SERPL CALC-MCNC: 100 MG/DL
TRIGL SERPL-MCNC: 125 MG/DL (ref 0–149)

## 2020-09-24 PROCEDURE — 84450 TRANSFERASE (AST) (SGOT): CPT

## 2020-09-24 PROCEDURE — 84460 ALANINE AMINO (ALT) (SGPT): CPT

## 2020-09-24 PROCEDURE — 80061 LIPID PANEL: CPT

## 2020-09-24 PROCEDURE — 36415 COLL VENOUS BLD VENIPUNCTURE: CPT

## 2020-09-28 ENCOUNTER — TELEPHONE (OUTPATIENT)
Dept: DERMATOLOGY | Facility: IMAGING CENTER | Age: 20
End: 2020-09-28

## 2020-09-28 DIAGNOSIS — Z79.899 ENCOUNTER FOR LONG-TERM CURRENT USE OF HIGH RISK MEDICATION: ICD-10-CM

## 2020-09-28 RX ORDER — ISOTRETINOIN 40 MG/1
40 CAPSULE ORAL DAILY
Qty: 30 CAP | Refills: 0 | Status: SHIPPED | OUTPATIENT
Start: 2020-09-28 | End: 2020-11-02 | Stop reason: SDUPTHER

## 2020-09-28 NOTE — TELEPHONE ENCOUNTER
Please let pt know that labs are normal and I have sent the Accutane to pharmacy on file.  Tell him he has to stop the doxycycline, if he hasn't already as it can interact with Accutane and doxy is no longer needed as we are starting Accutane. Labs are ordered and to be done 2 -3 days before appt with me. He needs to make appt with me for 30 days after starting Accutane.

## 2020-10-20 ENCOUNTER — TELEMEDICINE (OUTPATIENT)
Dept: BEHAVIORAL HEALTH | Facility: CLINIC | Age: 20
End: 2020-10-20
Payer: COMMERCIAL

## 2020-10-20 DIAGNOSIS — F40.10 SOCIAL PHOBIA: ICD-10-CM

## 2020-10-20 DIAGNOSIS — F41.1 GENERALIZED ANXIETY DISORDER: ICD-10-CM

## 2020-10-20 PROCEDURE — 90834 PSYTX W PT 45 MINUTES: CPT | Mod: 95,CR | Performed by: PSYCHOLOGIST

## 2020-10-20 NOTE — BH THERAPY
[facility]  Therapy Progress Note    Met with the patient per their request via (HIPPA compliant) Zoom video conference to accommodate state imposed restrictions on social contact due to the COVID-19 pandemic.    Patient Name: Mike Miller  Patient MRN: [id number]  Today's Date: 10/20/2020    Type of session:Individual psychotherapy  Length of session: 38 minutes  Persons in attendance:Patient    Subjective/New Info: The patient ID/Chief Complaint:  The patient is a 20 year old male, single, .  The patient was referred by psychiatry and voluntarily presented for an individual intake to address anxiety symptoms including chronic worrying and mild to moderate social anxiety with some evidence of vocal tic related to anxiety.  Reviewed limits of confidentiality and [facility] policies; patient expressed understanding and agreed to voluntarily proceed with evaluation and treatment.     Interval History:   Session Focus: The patient's estimated global assessment of functioning indicates a mild level of difficulty with some problems in relationships and work functioning.  Patient continues to struggle with the idea that he is narcissistic even other appears to be limited evidence developmentally and there is no pervasive pattern.  The patient does recognize that his concerns about narcissistic is primarily an attempt to avoid behaving in a way consisting with his father.  The patient has moved out of the family home and was living with a friend.  Also discussed today was the role that narcissistic employees related to his physical disability and the lack of evidence to support individuals with physical disability are narcissistic.    Therapeutic Intervention: Cognitive Behavioral Therapy      Planned Intervention: Examining automatic thoughts and schemas    Objective/Observations:      Patient did not present in acute distress. Patient was appropriately groomed. Patient was  alert and oriented x4. Eye contact was appropriate. No abnormalities in attention or concentration were noted. No abnormalities of movement present; psychomotor activity was normal. Speech was fluent and regular in rhythm, rate, volume, and tone. Thought processes linear, logical and goal directed. There was no evidence of thought disorder. No auditory or visual hallucinations. Long and short term memory appeared to be intact. Insight, judgment, and impulse control were deemed to be good.  Reported mood was “comfortable.” Affect was full-ranging and appropriate to thought content and conversation.  Patient denied past and current suicidal and homicidal ideation in plan, intent, and preparatory behavior.         Diagnoses:   1. IRMA (generalized anxiety disorder)    2. Social anxiety disorder       The patient denied any suicide-related ideation and/or behaviors and intent/plan, denied thoughts about death and dying both in session and during the period since last appointment, or past 2 weeks.    Risk Level: Not Currently at Clinically Significant Risk  Hospitalization is not deemed necessary at this time as the patient does not present a clear or imminent danger to self or others. No indication for pursuing higher level of care. Outpatient management is currently most appropriate and least restrictive level of care.  Current risk:   SUICIDE: Low   Homicide: Not applicable   Self-harm: Not applicable   Relapse: Not applicable   Other:    Safety Plan reviewed? Not Indicated   If evidence of imminent risk is present, intervention/plan:         Treatment Goal(s)/Objective(s) addressed:    Anxiety   Goal: Develop strategies to reduce symptoms, or   Reduce anxiety and improve coping skills   Report feeling more positive about self and abilities during therapy sessions   Develop strategies for thought distraction when fixating on the future     Progress toward Treatment Goals: Moderate improvement    Plan:    1) The patient  will return to the clinic 3-4 weeks.  2) Crisis Response Plan:  Reviewed emergency resources with the patient and the patient expressed understanding including:  If feeling suicidal, patient will call or present to the [facility] during duty hours or present to closest ED ([facility] or [facility], call 911 or crisis hotline (2-294-805-VPOM) after duty hours.  3) Referrals/Consults:  N/A  4) Barriers to Learning:  No  5) Readiness to Learn:  Yes  6) Cultural Concerns:  No  7) Patient voiced understanding of, and agreement with, plan and goals as annotated above.  8) Declare these services are medically necessary and appropriate to the patient’s diagnosis and needs  9) The point of contact at the [facility] regarding this evaluation is Dr. Chavez, Psychologist.      Jordin Chavez III, Ed.D.

## 2020-10-28 ENCOUNTER — HOSPITAL ENCOUNTER (OUTPATIENT)
Dept: LAB | Facility: MEDICAL CENTER | Age: 20
End: 2020-10-28
Attending: NURSE PRACTITIONER
Payer: COMMERCIAL

## 2020-10-28 LAB
ALT SERPL-CCNC: 16 U/L (ref 2–50)
AST SERPL-CCNC: 18 U/L (ref 12–45)
CHOLEST SERPL-MCNC: 210 MG/DL (ref 100–199)
FASTING STATUS PATIENT QL REPORTED: NORMAL
HDLC SERPL-MCNC: 47 MG/DL
LDLC SERPL CALC-MCNC: 137 MG/DL
TRIGL SERPL-MCNC: 130 MG/DL (ref 0–149)

## 2020-10-28 PROCEDURE — 80061 LIPID PANEL: CPT

## 2020-10-28 PROCEDURE — 84450 TRANSFERASE (AST) (SGOT): CPT

## 2020-10-28 PROCEDURE — 84460 ALANINE AMINO (ALT) (SGPT): CPT

## 2020-10-28 PROCEDURE — 36415 COLL VENOUS BLD VENIPUNCTURE: CPT

## 2020-11-02 ENCOUNTER — OFFICE VISIT (OUTPATIENT)
Dept: DERMATOLOGY | Facility: IMAGING CENTER | Age: 20
End: 2020-11-02
Payer: COMMERCIAL

## 2020-11-02 DIAGNOSIS — L70.0 ACNE VULGARIS: ICD-10-CM

## 2020-11-02 DIAGNOSIS — Z79.899 ENCOUNTER FOR LONG-TERM CURRENT USE OF HIGH RISK MEDICATION: ICD-10-CM

## 2020-11-02 PROCEDURE — 99212 OFFICE O/P EST SF 10 MIN: CPT | Performed by: NURSE PRACTITIONER

## 2020-11-02 RX ORDER — ISOTRETINOIN 40 MG/1
40 CAPSULE ORAL 2 TIMES DAILY
Qty: 60 CAP | Refills: 0 | Status: SHIPPED | OUTPATIENT
Start: 2020-11-02 | End: 2021-01-06 | Stop reason: SDUPTHER

## 2020-11-02 ASSESSMENT — ENCOUNTER SYMPTOMS
CHILLS: 0
DIAPHORESIS: 0
FEVER: 0
WEIGHT LOSS: 0

## 2020-11-02 NOTE — PROGRESS NOTES
"Dermatology Return Patient Visit    Chief Complaint   Patient presents with   • Follow-Up   • Acne       Subjective:     HPI:   Mike Miller is a 20 y.o. male presenting for    Follow up acne, isotretinoin-completed first month  Started treatment:  Dose:  40 mg daily  Notes fewer breakouts  Tolerating well, +dry eyes/skin, no nausea/vomiting/diarrhea, myalgias, arthralgias, mood disturbances      Initial history:  Acne  Started: 9 years of age  Active on: face  Aggravated by: touching face, has to bathe and wash hair every night  Treatment currently used: doxycycline, OTC moisturizer  Prior treatments used: None  Face wash/moisturizer: OTC  Family history of scarring acne: Paternal uncle      Past Medical History:   Diagnosis Date   • Anxiety    • Asthma in remission    • Back pain    • Cold 12/21/2017    \"I had a head cold about 2 weeks ago\"  denies SOB, productive cough   • Depression    • GERD (gastroesophageal reflux disease)    • Panic disorder    • Pneumonia    • Psychiatric problem 12/2017    depression   • Tic disorder        Current Outpatient Medications on File Prior to Visit   Medication Sig Dispense Refill   • buPROPion SR (WELLBUTRIN-SR) 150 MG TABLET SR 12 HR sustained-release tablet Take 1 Tab by mouth every morning. 90 Tab 0   • sertraline (ZOLOFT) 100 MG Tab Take 1 Tab by mouth every bedtime. 90 Tab 0   • pantoprazole (PROTONIX) 40 MG Tablet Delayed Response Take 1 Tab by mouth every day. 30 Tab 10     No current facility-administered medications on file prior to visit.        No Known Allergies    Family History   Problem Relation Age of Onset   • Hypertension Father    • Heart Disease Father         s/p CABG   • Depression Father    • Other Father         Spinal stenosis   • Depression Mother         on Wellbutrin   • Sleep Apnea Neg Hx        Social History     Socioeconomic History   • Marital status: Single     Spouse name: Not on file   • Number of children: Not on file   • Years of " education: Not on file   • Highest education level: Not on file   Occupational History   • Not on file   Social Needs   • Financial resource strain: Not on file   • Food insecurity     Worry: Not on file     Inability: Not on file   • Transportation needs     Medical: Not on file     Non-medical: Not on file   Tobacco Use   • Smoking status: Former Smoker     Packs/day: 0.00   • Smokeless tobacco: Never Used   Substance and Sexual Activity   • Alcohol use: Yes     Alcohol/week: 0.0 oz     Comment: infrequent   • Drug use: Yes     Frequency: 7.0 times per week     Types: Marijuana, Oral     Comment: CBD drops    • Sexual activity: Not Currently     Partners: Female   Lifestyle   • Physical activity     Days per week: Not on file     Minutes per session: Not on file   • Stress: Not on file   Relationships   • Social connections     Talks on phone: Not on file     Gets together: Not on file     Attends Evangelical service: Not on file     Active member of club or organization: Not on file     Attends meetings of clubs or organizations: Not on file     Relationship status: Not on file   • Intimate partner violence     Fear of current or ex partner: Not on file     Emotionally abused: Not on file     Physically abused: Not on file     Forced sexual activity: Not on file   Other Topics Concern   • Behavioral problems Not Asked   • Interpersonal relationships Not Asked   • Sad or not enjoying activities Not Asked   • Suicidal thoughts Not Asked   • Poor school performance Not Asked   • Reading difficulties Not Asked   • Speech difficulties Not Asked   • Writing difficulties Not Asked   • Inadequate sleep Not Asked   • Excessive TV viewing Not Asked   • Excessive video game use Not Asked   • Inadequate exercise Not Asked   • Sports related Not Asked   • Poor diet Not Asked   • Family concerns for drug/alcohol abuse Not Asked   • Poor oral hygiene Not Asked   • Bike safety Not Asked   • Family concerns vehicle safety Not Asked    Social History Narrative   • Not on file       Review of Systems   Constitutional: Negative for chills, diaphoresis, fever, malaise/fatigue and weight loss.   Skin: Negative for itching and rash.        Objective:     A focused cutaneous exam was completed including: face, lips and neck with the following pertinent findings listed below. Remaining above-listed examined areas within normal limits / negative for rashes or lesions.        There were no vitals taken for this visit.    Physical Exam   Constitutional: He is oriented to person, place, and time and well-developed, well-nourished, and in no distress. No distress.   HENT:   Head: Normocephalic.   numerous erythematous papules, 1-2 pustules, numerous open and closed comedones to forehead primarily and nose cheeks and chin. pih and some pitting scarring noted    Chest and back are spared     Pulmonary/Chest: Effort normal.   Neurological: He is alert and oriented to person, place, and time.   Skin: Skin is warm and dry. No rash noted. No erythema.   Psychiatric: Mood normal.       DATA: Results for NEGRO SANTIAGO (MRN [id number]) as of 11/2/2020 14:00   Ref. Range 10/28/2020 07:50   AST(SGOT) Latest Ref Range: 12 - 45 U/L 18   ALT(SGPT) Latest Ref Range: 2 - 50 U/L 16   Fasting Status Unknown Fasting   Cholesterol,Tot Latest Ref Range: 100 - 199 mg/dL 210 (H)   Triglycerides Latest Ref Range: 0 - 149 mg/dL 130   HDL Latest Ref Range: >=40 mg/dL 47   LDL Latest Ref Range: <100 mg/dL 137 (H)       Assessment and Plan:     Acne vulgaris - severe, inflammatory, scaring  - re-educated patient about diagnosis, management options, and expectations of treatment  -continue accutane at 40mg bid    re-extensively discussed the potential adverse effects of treatment, including, but not limited to, teratogenicity, ocular disturbances, bony abnormailities, lipid disturbances, elevated LFTs/liver inflammation, reported associations with IBD, depression, endocrine  disturbances, hematologic abnormalities, and myopathy. More common side effects of dry skin/eyes/mucosa, photosensitivity, sticky skin, hair loss, fragile nails, paronychia, myalgias, fatigue, headache, and abdominal pain/nausea/diarrhea were also reviewed.IPatient is aware he cannot share the medication, and he cannot donate blood during treatment, and through one month after completion of treatment.  - we discussed the need for baseline blood work, and follow-up blood work once treatment is initiated    - pt aware to notify me asap if any new/concerning symptoms  - lubricating eye drops, nasal spray, vaseline  - dose: isotretinoin 80mg daily (divided doses)   - ipledge ID: [id number]  Goal dose: 120-150mg/kg; 79.4 kg = 9528mg-10942er; total cumulative dose on day of evaluation: 1200mg  Labs stable    2. Encounter for long-term current use of high risk medication    - Lipid Profile  - ALANINE AMINO-TRANS  - ASPARTATE AMINO-FARRIS      Followup: Return in about 1 month (around 12/2/2020) for accutane or sooner for any concerns.    PHILL Puckett.

## 2020-11-18 ENCOUNTER — HOSPITAL ENCOUNTER (OUTPATIENT)
Dept: LAB | Facility: MEDICAL CENTER | Age: 20
End: 2020-11-18
Attending: NURSE PRACTITIONER
Payer: COMMERCIAL

## 2020-11-18 DIAGNOSIS — Z20.822 COUGH WITH EXPOSURE TO COVID-19 VIRUS: ICD-10-CM

## 2020-11-18 DIAGNOSIS — R05.8 COUGH WITH EXPOSURE TO COVID-19 VIRUS: ICD-10-CM

## 2020-11-18 PROCEDURE — U0003 INFECTIOUS AGENT DETECTION BY NUCLEIC ACID (DNA OR RNA); SEVERE ACUTE RESPIRATORY SYNDROME CORONAVIRUS 2 (SARS-COV-2) (CORONAVIRUS DISEASE [COVID-19]), AMPLIFIED PROBE TECHNIQUE, MAKING USE OF HIGH THROUGHPUT TECHNOLOGIES AS DESCRIBED BY CMS-2020-01-R: HCPCS

## 2020-11-18 PROCEDURE — C9803 HOPD COVID-19 SPEC COLLECT: HCPCS

## 2020-11-19 LAB — COVID ORDER STATUS COVID19: NORMAL

## 2020-11-20 LAB
SARS-COV-2 RNA RESP QL NAA+PROBE: DETECTED
SPECIMEN SOURCE: ABNORMAL

## 2020-11-23 ENCOUNTER — TELEPHONE (OUTPATIENT)
Dept: MEDICAL GROUP | Facility: MEDICAL CENTER | Age: 20
End: 2020-11-23

## 2020-11-23 NOTE — TELEPHONE ENCOUNTER
Phone Number Called: [phone]    Call outcome: Left detailed message for patient. Informed to call back with any additional questions.    Message: Called to inform patient that COVID result was positive. Told patient to call with any questions.      ----- Message from CHAZ Duarte sent at 11/20/2020 11:46 AM PST -----  Patient sent my chart message and also advised him by telephone that he was positive for COVID-19 and his [employer] message should give him the information as to what to do next.

## 2020-12-15 ENCOUNTER — TELEMEDICINE (OUTPATIENT)
Dept: BEHAVIORAL HEALTH | Facility: CLINIC | Age: 20
End: 2020-12-15
Payer: COMMERCIAL

## 2020-12-15 DIAGNOSIS — F40.10 SOCIAL PHOBIA: ICD-10-CM

## 2020-12-15 DIAGNOSIS — F41.1 GENERALIZED ANXIETY DISORDER: ICD-10-CM

## 2020-12-15 PROCEDURE — 90834 PSYTX W PT 45 MINUTES: CPT | Mod: 95,CR | Performed by: PSYCHOLOGIST

## 2020-12-15 NOTE — BH THERAPY
[facility]  Therapy Progress Note    Met with the patient per their request via (HIPPA compliant) Zoom video conference to accommodate state imposed restrictions on social contact due to the COVID-19 pandemic.    Patient Name: Mike Miller  Patient MRN: [id number]  Today's Date: 12/15/2020    Type of session:Individual psychotherapy  Length of session: 38 minutes  Persons in attendance:Patient    Subjective/New Info: The patient ID/Chief Complaint:  The patient is a 20 year old male, single, .  The patient was referred by psychiatry and voluntarily presented for an individual intake to address anxiety symptoms including chronic worrying and mild to moderate social anxiety with some evidence of vocal tic related to anxiety.  Reviewed limits of confidentiality and [facility] policies; patient expressed understanding and agreed to voluntarily proceed with evaluation and treatment.     Interval History:   Session Focus: The patient's estimated global assessment of functioning appeared reasonably good with only short lived and expectable reactions to everyday stressful events. The patient shows only slight difficulty in relational and/or work/school functioning.  Talked about moving forward in social relationships as the pandemic to an end.  The patient has a number of questions and concerns about dating relationships and wants only to be interested in his presents.  Discussed ways of identifying positive characteristics and marking as positive characteristics in dating relationship.  Patient was also informed that this provider will be leaving at the end of December.    Therapeutic Intervention: Cognitive Behavioral Therapy      Planned Intervention: Examining automatic thoughts and schemas    Objective/Observations:      Patient did not present in acute distress. Patient was appropriately groomed. Patient was alert and oriented x4. Eye contact was appropriate. No  abnormalities in attention or concentration were noted. No abnormalities of movement present; psychomotor activity was normal. Speech was fluent and regular in rhythm, rate, volume, and tone. Thought processes linear, logical and goal directed. There was no evidence of thought disorder. No auditory or visual hallucinations. Long and short term memory appeared to be intact. Insight, judgment, and impulse control were deemed to be good.  Reported mood was “at ease.” Affect was full-ranging and appropriate to thought content and conversation.  Patient denied past and current suicidal and homicidal ideation in plan, intent, and preparatory behavior.         Diagnoses:   1. IRMA (generalized anxiety disorder)    2. Social anxiety disorder       The patient denied any suicide-related ideation and/or behaviors and intent/plan, denied thoughts about death and dying both in session and during the period since last appointment, or past 2 weeks.    Risk Level: Not Currently at Clinically Significant Risk  Hospitalization is not deemed necessary at this time as the patient does not present a clear or imminent danger to self or others. No indication for pursuing higher level of care. Outpatient management is currently most appropriate and least restrictive level of care.  Current risk:   SUICIDE: Low   Homicide: Not applicable   Self-harm: Not applicable   Relapse: Not applicable   Other:    Safety Plan reviewed? Not Indicated   If evidence of imminent risk is present, intervention/plan:         Treatment Goal(s)/Objective(s) addressed:    Anxiety   Goal: Develop strategies to reduce symptoms, or   Reduce anxiety and improve coping skills   Report feeling more positive about self and abilities during therapy sessions   Develop strategies for thought distraction when fixating on the future     Progress toward Treatment Goals: Moderate improvement    Plan:    1) The patient will return to the clinic 2-3 weeks.  2) Crisis Response  Plan:  Reviewed emergency resources with the patient and the patient expressed understanding including:  If feeling suicidal, patient will call or present to the [facility] during duty hours or present to closest ED ([facility] or [facility], call 911 or crisis hotline (6-290-462-FIXK) after duty hours.  3) Referrals/Consults:  N/A  4) Barriers to Learning:  No  5) Readiness to Learn:  Yes  6) Cultural Concerns:  No  7) Patient voiced understanding of, and agreement with, plan and goals as annotated above.  8) Declare these services are medically necessary and appropriate to the patient’s diagnosis and needs  9) The point of contact at the [facility] regarding this evaluation is Dr. Chavez, Psychologist.      Jordin Chavez III, Ed.D.

## 2020-12-22 ENCOUNTER — TELEMEDICINE (OUTPATIENT)
Dept: BEHAVIORAL HEALTH | Facility: CLINIC | Age: 20
End: 2020-12-22
Payer: COMMERCIAL

## 2020-12-22 DIAGNOSIS — F41.1 GENERALIZED ANXIETY DISORDER: ICD-10-CM

## 2020-12-22 DIAGNOSIS — F32.A DEPRESSIVE DISORDER: ICD-10-CM

## 2020-12-22 PROCEDURE — 90834 PSYTX W PT 45 MINUTES: CPT | Mod: 95,CR | Performed by: PSYCHOLOGIST

## 2020-12-24 NOTE — BH THERAPY
[facility]  Therapy Progress Note    Met with the patient per their request via (HIPPA compliant) Zoom video conference to accommodate state imposed restrictions on social contact due to the COVID-19 pandemic.    Patient Name: Mike Miller  Patient MRN: [id number]  Today's Date: 12/22/2020    Type of session:Individual psychotherapy  Length of session: 38 minutes  Persons in attendance:Patient    Subjective/New Info: The patient ID/Chief Complaint:  The patient is a 20 year old male, single, .  The patient was referred by psychiatry and voluntarily presented for an individual intake to address anxiety symptoms including chronic worrying and mild to moderate social anxiety with some evidence of vocal tic related to anxiety.  Reviewed limits of confidentiality and [facility] policies; patient expressed understanding and agreed to voluntarily proceed with evaluation and treatment.     Interval History:   Session Focus: The patient's estimated global assessment of functioning appeared reasonably good with only short lived and expectable reactions to everyday stressful events. The patient shows only slight difficulty in relational and/or work/school functioning.  Continue to focus on reinforcing the efforts that the patient is made improving his understanding of himself and talking about the limitations that the pandemic has placed on increasing social skills.  At this point time this patient is definitely ready to engage in increased social interactions and appears to have a good understanding of how previous interactions have negatively impacted the way in which he looks at himself.  Discussed with the patient resources in the community to continue psychotherapy.  Patient was informed that this provider is leaving at the end of the month.    Therapeutic Intervention: Cognitive Behavioral Therapy      Planned Intervention: Patient will be referred to the community for  continued psychotherapy  Objective/Observations:      Patient did not present in acute distress. Patient was appropriately groomed. Patient was alert and oriented x4. Eye contact was appropriate. No abnormalities in attention or concentration were noted. No abnormalities of movement present; psychomotor activity was normal. Speech was fluent and regular in rhythm, rate, volume, and tone. Thought processes linear, logical and goal directed. There was no evidence of thought disorder. No auditory or visual hallucinations. Long and short term memory appeared to be intact. Insight, judgment, and impulse control were deemed to be good.  Reported mood was “comfortable.” Affect was full-ranging and appropriate to thought content and conversation.  Patient denied past and current suicidal and homicidal ideation in plan, intent, and preparatory behavior.         Diagnoses:   1. Depressive disorder    2. IRMA (generalized anxiety disorder)       The patient denied any suicide-related ideation and/or behaviors and intent/plan, denied thoughts about death and dying both in session and during the period since last appointment, or past 2 weeks.    Risk Level: Not Currently at Clinically Significant Risk  Hospitalization is not deemed necessary at this time as the patient does not present a clear or imminent danger to self or others. No indication for pursuing higher level of care. Outpatient management is currently most appropriate and least restrictive level of care.  Current risk:   SUICIDE: Low   Homicide: Not applicable   Self-harm: Not applicable   Relapse: Not applicable   Other:    Safety Plan reviewed? Not Indicated   If evidence of imminent risk is present, intervention/plan:         Treatment Goal(s)/Objective(s) addressed:    Anxiety   Goal: Develop strategies to reduce symptoms, or   Reduce anxiety and improve coping skills   Report feeling more positive about self and abilities during therapy sessions   Develop  strategies for thought distraction when fixating on the future     Progress toward Treatment Goals: Moderate improvement    Plan:    1) The patient will be referred to the community for continued psychotherapy.  2) Crisis Response Plan:  Reviewed emergency resources with the patient and the patient expressed understanding including:  If feeling suicidal, patient will call or present to the [facility] during duty hours or present to closest ED ([facility] or [facility], call 911 or crisis hotline (9-227-217-EFRJ) after duty hours.  3) Referrals/Consults:  N/A  4) Barriers to Learning:  No  5) Readiness to Learn:  Yes  6) Cultural Concerns:  No  7) Patient voiced understanding of, and agreement with, plan and goals as annotated above.  8) Declare these services are medically necessary and appropriate to the patient’s diagnosis and needs  9) The point of contact at the [facility] regarding this evaluation is Dr. Chavez, Psychologist.      Jordin Chavez III, Ed.D.

## 2020-12-30 ENCOUNTER — OFFICE VISIT (OUTPATIENT)
Dept: DERMATOLOGY | Facility: IMAGING CENTER | Age: 20
End: 2020-12-30
Payer: COMMERCIAL

## 2020-12-30 DIAGNOSIS — Z79.899 ENCOUNTER FOR LONG-TERM CURRENT USE OF HIGH RISK MEDICATION: ICD-10-CM

## 2020-12-30 DIAGNOSIS — L70.0 ACNE VULGARIS: ICD-10-CM

## 2020-12-30 PROCEDURE — 99213 OFFICE O/P EST LOW 20 MIN: CPT | Performed by: NURSE PRACTITIONER

## 2020-12-30 ASSESSMENT — ENCOUNTER SYMPTOMS
CHILLS: 0
DIAPHORESIS: 0
FEVER: 0
WEIGHT LOSS: 0

## 2020-12-30 NOTE — PROGRESS NOTES
"Dermatology Return Patient Visit    Chief Complaint   Patient presents with   • Acne       Subjective:     HPI:   Mike Miller is a 20 y.o. male presenting for    Follow up acne, isotretinoin-completed first month  Started treatment:  Dose:  40 mg BID  Notes fewer breakouts  Tolerating well, +dry eyes/skin, no nausea/vomiting/diarrhea, myalgias, arthralgias, mood disturbances      Initial history:  Acne  Started: 9 years of age  Active on: face  Aggravated by: touching face, has to bathe and wash hair every night  Treatment currently used: doxycycline, OTC moisturizer  Prior treatments used: None  Face wash/moisturizer: OTC  Family history of scarring acne: Paternal uncle      Past Medical History:   Diagnosis Date   • Anxiety    • Asthma in remission    • Back pain    • Cold 12/21/2017    \"I had a head cold about 2 weeks ago\"  denies SOB, productive cough   • Depression    • GERD (gastroesophageal reflux disease)    • Panic disorder    • Pneumonia    • Psychiatric problem 12/2017    depression   • Tic disorder        Current Outpatient Medications on File Prior to Visit   Medication Sig Dispense Refill   • buPROPion SR (ZYBAN) 150 MG SR tablet Take 1 Tab by mouth every morning. 90 Tab 0   • sertraline (ZOLOFT) 100 MG Tab Take 1 Tab by mouth every day. 90 Tab 0   • isotretinoin (ACCUTANE) 40 MG capsule Take 1 Cap by mouth 2 times a day. 60 Cap 0   • pantoprazole (PROTONIX) 40 MG Tablet Delayed Response Take 1 Tab by mouth every day. 30 Tab 10     No current facility-administered medications on file prior to visit.        No Known Allergies    Family History   Problem Relation Age of Onset   • Hypertension Father    • Heart Disease Father         s/p CABG   • Depression Father    • Other Father         Spinal stenosis   • Depression Mother         on Wellbutrin   • Sleep Apnea Neg Hx        Social History     Socioeconomic History   • Marital status: Single     Spouse name: Not on file   • Number of children: " Not on file   • Years of education: Not on file   • Highest education level: Not on file   Occupational History   • Not on file   Social Needs   • Financial resource strain: Not on file   • Food insecurity     Worry: Not on file     Inability: Not on file   • Transportation needs     Medical: Not on file     Non-medical: Not on file   Tobacco Use   • Smoking status: Former Smoker     Packs/day: 0.00   • Smokeless tobacco: Never Used   Substance and Sexual Activity   • Alcohol use: Yes     Alcohol/week: 0.0 oz     Comment: infrequent   • Drug use: Yes     Frequency: 7.0 times per week     Types: Marijuana, Oral     Comment: CBD drops    • Sexual activity: Not Currently     Partners: Female   Lifestyle   • Physical activity     Days per week: Not on file     Minutes per session: Not on file   • Stress: Not on file   Relationships   • Social connections     Talks on phone: Not on file     Gets together: Not on file     Attends Yazidism service: Not on file     Active member of club or organization: Not on file     Attends meetings of clubs or organizations: Not on file     Relationship status: Not on file   • Intimate partner violence     Fear of current or ex partner: Not on file     Emotionally abused: Not on file     Physically abused: Not on file     Forced sexual activity: Not on file   Other Topics Concern   • Behavioral problems Not Asked   • Interpersonal relationships Not Asked   • Sad or not enjoying activities Not Asked   • Suicidal thoughts Not Asked   • Poor school performance Not Asked   • Reading difficulties Not Asked   • Speech difficulties Not Asked   • Writing difficulties Not Asked   • Inadequate sleep Not Asked   • Excessive TV viewing Not Asked   • Excessive video game use Not Asked   • Inadequate exercise Not Asked   • Sports related Not Asked   • Poor diet Not Asked   • Family concerns for drug/alcohol abuse Not Asked   • Poor oral hygiene Not Asked   • Bike safety Not Asked   • Family concerns  vehicle safety Not Asked   Social History Narrative   • Not on file       Review of Systems   Constitutional: Negative for chills, diaphoresis, fever, malaise/fatigue and weight loss.   Skin: Negative for itching and rash.        Objective:     A focused cutaneous exam was completed including: face, lips and neck with the following pertinent findings listed below. Remaining above-listed examined areas within normal limits / negative for rashes or lesions.        There were no vitals taken for this visit.    Physical Exam   Constitutional: He is oriented to person, place, and time and well-developed, well-nourished, and in no distress. No distress.   HENT:   Head: Normocephalic.   Face clear of active acne. pih and some pitting scarring noted    Chest and back are spared     Pulmonary/Chest: Effort normal.   Neurological: He is alert and oriented to person, place, and time.   Skin: Skin is warm and dry. No rash noted. No erythema.   Psychiatric: Mood normal.         Assessment and Plan:     Acne vulgaris - severe, inflammatory, scaring-improving on Accutane  - re-educated patient about diagnosis, management options, and expectations of treatment  -continue accutane at 40mg bid    re-extensively discussed the potential adverse effects of treatment, including, but not limited to, teratogenicity, ocular disturbances, bony abnormailities, lipid disturbances, elevated LFTs/liver inflammation, reported associations with IBD, depression, endocrine disturbances, hematologic abnormalities, and myopathy. More common side effects of dry skin/eyes/mucosa, photosensitivity, sticky skin, hair loss, fragile nails, paronychia, myalgias, fatigue, headache, and abdominal pain/nausea/diarrhea were also reviewed.IPatient is aware he cannot share the medication, and he cannot donate blood during treatment, and through one month after completion of treatment.  - we discussed the need for baseline blood work, and follow-up blood work once  treatment is initiated    - pt aware to notify me asap if any new/concerning symptoms  - lubricating eye drops, nasal spray, vaseline  - dose: isotretinoin 80mg daily (divided doses)   - ipledge ID: [id number]  Goal dose: 120-150mg/kg; 79.4 kg = 9528mg-39287mk; total cumulative dose on day of evaluation: 3600mg        2. Encounter for long-term current use of high risk medication    -needs to do previously ordered labs and then we can refill medication if WNL    I have performed a physical exam and reviewed and updated ROS and Plan today (12/30/2020). In review of dermatology visit (11/2/2020), there are no changes except as documented above.         Followup: Return in about 1 month (around 1/30/2021) for acne or sooner for any concerns.    PHILL Puckett.

## 2020-12-31 ENCOUNTER — HOSPITAL ENCOUNTER (OUTPATIENT)
Dept: LAB | Facility: MEDICAL CENTER | Age: 20
End: 2020-12-31
Attending: NURSE PRACTITIONER
Payer: COMMERCIAL

## 2020-12-31 LAB
ALT SERPL-CCNC: 12 U/L (ref 2–50)
AST SERPL-CCNC: 17 U/L (ref 12–45)
CHOLEST SERPL-MCNC: 187 MG/DL (ref 100–199)
FASTING STATUS PATIENT QL REPORTED: NORMAL
HDLC SERPL-MCNC: 48 MG/DL
LDLC SERPL CALC-MCNC: 107 MG/DL
TRIGL SERPL-MCNC: 158 MG/DL (ref 0–149)

## 2020-12-31 PROCEDURE — 36415 COLL VENOUS BLD VENIPUNCTURE: CPT

## 2020-12-31 PROCEDURE — 84460 ALANINE AMINO (ALT) (SGPT): CPT

## 2020-12-31 PROCEDURE — 80061 LIPID PANEL: CPT

## 2020-12-31 PROCEDURE — 84450 TRANSFERASE (AST) (SGOT): CPT

## 2021-01-06 ENCOUNTER — TELEPHONE (OUTPATIENT)
Dept: DERMATOLOGY | Facility: IMAGING CENTER | Age: 21
End: 2021-01-06

## 2021-01-06 DIAGNOSIS — Z79.899 ENCOUNTER FOR LONG-TERM CURRENT USE OF HIGH RISK MEDICATION: ICD-10-CM

## 2021-01-06 DIAGNOSIS — L70.0 ACNE VULGARIS: ICD-10-CM

## 2021-01-06 RX ORDER — ISOTRETINOIN 40 MG/1
40 CAPSULE ORAL 2 TIMES DAILY
Qty: 60 CAP | Refills: 0 | Status: SHIPPED | OUTPATIENT
Start: 2021-01-06 | End: 2021-02-09 | Stop reason: SDUPTHER

## 2021-01-06 NOTE — TELEPHONE ENCOUNTER
Please inform patient that I have refilled isotretinoin 40 mg to be taken twice daily.  Labs reordered.  Triglycerides are slightly elevated but not so much that we have to reduce the dose.  We will check in 1 month.  Please update I pledge.

## 2021-02-09 ENCOUNTER — TELEPHONE (OUTPATIENT)
Dept: DERMATOLOGY | Facility: IMAGING CENTER | Age: 21
End: 2021-02-09

## 2021-02-09 ENCOUNTER — TELEMEDICINE (OUTPATIENT)
Dept: DERMATOLOGY | Facility: IMAGING CENTER | Age: 21
End: 2021-02-09
Payer: COMMERCIAL

## 2021-02-09 DIAGNOSIS — L70.0 ACNE VULGARIS: ICD-10-CM

## 2021-02-09 DIAGNOSIS — Z79.899 ENCOUNTER FOR LONG-TERM CURRENT USE OF HIGH RISK MEDICATION: ICD-10-CM

## 2021-02-09 PROCEDURE — 99213 OFFICE O/P EST LOW 20 MIN: CPT | Mod: 95,CR | Performed by: NURSE PRACTITIONER

## 2021-02-09 RX ORDER — ISOTRETINOIN 40 MG/1
40 CAPSULE ORAL 2 TIMES DAILY
Qty: 60 CAP | Refills: 0 | Status: SHIPPED | OUTPATIENT
Start: 2021-02-09 | End: 2021-03-09 | Stop reason: SDUPTHER

## 2021-02-09 ASSESSMENT — ENCOUNTER SYMPTOMS
DIAPHORESIS: 0
FEVER: 0
WEIGHT LOSS: 0
CHILLS: 0

## 2021-02-09 NOTE — PROGRESS NOTES
"Dermatology Return Patient Visit    As a means of avoiding spread of COVID-19, this visit is being conducted by televisit. This encounter was conducted via Zoom .   Verbal consent was obtained. Patient's identity was verified.      Chief Complaint   Patient presents with   • Acne       Subjective:     HPI:   Mike Miller is a 20 y.o. male presenting for    Follow up acne, isotretinoin-completed first month  Started treatment:  Dose:  40 mg BID  Notes fewer breakouts  Tolerating well, +dry eyes/skin, no nausea/vomiting/diarrhea, myalgias, arthralgias, mood disturbances      Initial history:  Acne  Started: 9 years of age  Active on: face  Aggravated by: touching face, has to bathe and wash hair every night  Treatment currently used: doxycycline, OTC moisturizer  Prior treatments used: None  Face wash/moisturizer: OTC  Family history of scarring acne: Paternal uncle      Past Medical History:   Diagnosis Date   • Anxiety    • Asthma in remission    • Back pain    • Cold 12/21/2017    \"I had a head cold about 2 weeks ago\"  denies SOB, productive cough   • Depression    • GERD (gastroesophageal reflux disease)    • Panic disorder    • Pneumonia    • Psychiatric problem 12/2017    depression   • Tic disorder        Current Outpatient Medications on File Prior to Visit   Medication Sig Dispense Refill   • buPROPion SR (ZYBAN) 150 MG SR tablet Take 1 Tab by mouth every morning. 90 Tab 0   • sertraline (ZOLOFT) 100 MG Tab Take 1 Tab by mouth every day. 90 Tab 0   • pantoprazole (PROTONIX) 40 MG Tablet Delayed Response Take 1 Tab by mouth every day. 30 Tab 10     No current facility-administered medications on file prior to visit.        No Known Allergies    Family History   Problem Relation Age of Onset   • Hypertension Father    • Heart Disease Father         s/p CABG   • Depression Father    • Other Father         Spinal stenosis   • Depression Mother         on Wellbutrin   • Sleep Apnea Neg Hx        Social " History     Socioeconomic History   • Marital status: Single     Spouse name: Not on file   • Number of children: Not on file   • Years of education: Not on file   • Highest education level: Not on file   Occupational History   • Not on file   Social Needs   • Financial resource strain: Not on file   • Food insecurity     Worry: Not on file     Inability: Not on file   • Transportation needs     Medical: Not on file     Non-medical: Not on file   Tobacco Use   • Smoking status: Former Smoker     Packs/day: 0.00   • Smokeless tobacco: Never Used   Substance and Sexual Activity   • Alcohol use: Yes     Alcohol/week: 0.0 oz     Comment: infrequent   • Drug use: Yes     Frequency: 7.0 times per week     Types: Marijuana, Oral     Comment: CBD drops    • Sexual activity: Not Currently     Partners: Female   Lifestyle   • Physical activity     Days per week: Not on file     Minutes per session: Not on file   • Stress: Not on file   Relationships   • Social connections     Talks on phone: Not on file     Gets together: Not on file     Attends Rastafari service: Not on file     Active member of club or organization: Not on file     Attends meetings of clubs or organizations: Not on file     Relationship status: Not on file   • Intimate partner violence     Fear of current or ex partner: Not on file     Emotionally abused: Not on file     Physically abused: Not on file     Forced sexual activity: Not on file   Other Topics Concern   • Behavioral problems Not Asked   • Interpersonal relationships Not Asked   • Sad or not enjoying activities Not Asked   • Suicidal thoughts Not Asked   • Poor school performance Not Asked   • Reading difficulties Not Asked   • Speech difficulties Not Asked   • Writing difficulties Not Asked   • Inadequate sleep Not Asked   • Excessive TV viewing Not Asked   • Excessive video game use Not Asked   • Inadequate exercise Not Asked   • Sports related Not Asked   • Poor diet Not Asked   • Family  concerns for drug/alcohol abuse Not Asked   • Poor oral hygiene Not Asked   • Bike safety Not Asked   • Family concerns vehicle safety Not Asked   Social History Narrative   • Not on file       Review of Systems   Constitutional: Negative for chills, diaphoresis, fever, malaise/fatigue and weight loss.   Skin: Negative for itching and rash.        Objective:     A focused cutaneous exam was completed including: face, lips and neck with the following pertinent findings listed below. Remaining above-listed examined areas within normal limits / negative for rashes or lesions.    Face clear of acne. Pt reports some scarring.     Exam limited by video and/or photos.     There were no vitals taken for this visit.        Assessment and Plan:     Acne vulgaris - severe, inflammatory, scaring-improving on Accutane  - re-educated patient about diagnosis, management options, and expectations of treatment  -continue accutane at 40mg bid    re-extensively discussed the potential adverse effects of treatment, including, but not limited to, teratogenicity, ocular disturbances, bony abnormailities, lipid disturbances, elevated LFTs/liver inflammation, reported associations with IBD, depression, endocrine disturbances, hematologic abnormalities, and myopathy. More common side effects of dry skin/eyes/mucosa, photosensitivity, sticky skin, hair loss, fragile nails, paronychia, myalgias, fatigue, headache, and abdominal pain/nausea/diarrhea were also reviewed.IPatient is aware he cannot share the medication, and he cannot donate blood during treatment, and through one month after completion of treatment.  - we discussed the need for baseline blood work, and follow-up blood work once treatment is initiated    - pt aware to notify me asap if any new/concerning symptoms  - lubricating eye drops, nasal spray, vaseline  - dose: isotretinoin 80mg daily (divided doses)   - ipledge ID: [id number]  Goal dose: 120-150mg/kg; 79.4 kg =  9528mg-88391at; total cumulative dose on day of evaluation: 4800mg        I have performed a physical exam and reviewed and updated ROS and Plan today (2/9/2021). In review of dermatology visit (12/30/2020), there are no changes except as documented above.         Followup: Return in about 1 month (around 3/9/2021) for acne.    PHILL Puckett.

## 2021-03-09 ENCOUNTER — TELEMEDICINE (OUTPATIENT)
Dept: DERMATOLOGY | Facility: IMAGING CENTER | Age: 21
End: 2021-03-09
Payer: COMMERCIAL

## 2021-03-09 DIAGNOSIS — L70.0 ACNE VULGARIS: ICD-10-CM

## 2021-03-09 DIAGNOSIS — Z79.899 ENCOUNTER FOR LONG-TERM CURRENT USE OF HIGH RISK MEDICATION: ICD-10-CM

## 2021-03-09 PROCEDURE — 99213 OFFICE O/P EST LOW 20 MIN: CPT | Mod: 95,CR | Performed by: NURSE PRACTITIONER

## 2021-03-09 RX ORDER — ISOTRETINOIN 40 MG/1
40 CAPSULE ORAL 2 TIMES DAILY
Qty: 60 CAPSULE | Refills: 0 | Status: SHIPPED | OUTPATIENT
Start: 2021-03-09 | End: 2021-04-30 | Stop reason: SDUPTHER

## 2021-03-09 ASSESSMENT — ENCOUNTER SYMPTOMS
WEIGHT LOSS: 0
FEVER: 0
CHILLS: 0
DIAPHORESIS: 0

## 2021-03-10 NOTE — PROGRESS NOTES
"Dermatology Return Patient Visit    As a means of avoiding spread of COVID-19, this visit is being conducted by televisit. This encounter was conducted via Zoom .   Verbal consent was obtained. Patient's identity was verified.      Chief Complaint   Patient presents with   • Acne       Subjective:     HPI:   Mike Miller is a 20 y.o. male presenting for    Follow up acne, isotretinoin-completed 4 months  Started treatment:  Dose:  40 mg BID  Notes fewer breakouts  Tolerating well, +dry eyes/skin, no nausea/vomiting/diarrhea, myalgias, arthralgias, mood disturbances      Initial history:  Acne  Started: 9 years of age  Active on: face  Aggravated by: touching face, has to bathe and wash hair every night  Treatment currently used: doxycycline, OTC moisturizer  Prior treatments used: None  Face wash/moisturizer: OTC  Family history of scarring acne: Paternal uncle      Past Medical History:   Diagnosis Date   • Anxiety    • Asthma in remission    • Back pain    • Cold 12/21/2017    \"I had a head cold about 2 weeks ago\"  denies SOB, productive cough   • Depression    • GERD (gastroesophageal reflux disease)    • Panic disorder    • Pneumonia    • Psychiatric problem 12/2017    depression   • Tic disorder        Current Outpatient Medications on File Prior to Visit   Medication Sig Dispense Refill   • buPROPion SR (ZYBAN) 150 MG SR tablet Take 1 Tab by mouth every morning. 90 Tab 0   • sertraline (ZOLOFT) 100 MG Tab Take 1 Tab by mouth every day. 90 Tab 0   • pantoprazole (PROTONIX) 40 MG Tablet Delayed Response Take 1 Tab by mouth every day. 30 Tab 10     No current facility-administered medications on file prior to visit.       No Known Allergies    Family History   Problem Relation Age of Onset   • Hypertension Father    • Heart Disease Father         s/p CABG   • Depression Father    • Other Father         Spinal stenosis   • Depression Mother         on Wellbutrin   • Sleep Apnea Neg Hx        Social History "     Socioeconomic History   • Marital status: Single     Spouse name: Not on file   • Number of children: Not on file   • Years of education: Not on file   • Highest education level: Not on file   Occupational History   • Not on file   Tobacco Use   • Smoking status: Former Smoker     Packs/day: 0.00   • Smokeless tobacco: Never Used   Substance and Sexual Activity   • Alcohol use: Yes     Alcohol/week: 0.0 oz     Comment: infrequent   • Drug use: Yes     Frequency: 7.0 times per week     Types: Marijuana, Oral     Comment: CBD drops    • Sexual activity: Not Currently     Partners: Female   Other Topics Concern   • Behavioral problems Not Asked   • Interpersonal relationships Not Asked   • Sad or not enjoying activities Not Asked   • Suicidal thoughts Not Asked   • Poor school performance Not Asked   • Reading difficulties Not Asked   • Speech difficulties Not Asked   • Writing difficulties Not Asked   • Inadequate sleep Not Asked   • Excessive TV viewing Not Asked   • Excessive video game use Not Asked   • Inadequate exercise Not Asked   • Sports related Not Asked   • Poor diet Not Asked   • Family concerns for drug/alcohol abuse Not Asked   • Poor oral hygiene Not Asked   • Bike safety Not Asked   • Family concerns vehicle safety Not Asked   Social History Narrative   • Not on file     Social Determinants of Health     Financial Resource Strain:    • Difficulty of Paying Living Expenses:    Food Insecurity:    • Worried About Running Out of Food in the Last Year:    • Ran Out of Food in the Last Year:    Transportation Needs:    • Lack of Transportation (Medical):    • Lack of Transportation (Non-Medical):    Physical Activity:    • Days of Exercise per Week:    • Minutes of Exercise per Session:    Stress:    • Feeling of Stress :    Social Connections:    • Frequency of Communication with Friends and Family:    • Frequency of Social Gatherings with Friends and Family:    • Attends Cheondoism Services:    •  Active Member of Clubs or Organizations:    • Attends Club or Organization Meetings:    • Marital Status:    Intimate Partner Violence:    • Fear of Current or Ex-Partner:    • Emotionally Abused:    • Physically Abused:    • Sexually Abused:        Review of Systems   Constitutional: Negative for chills, diaphoresis, fever, malaise/fatigue and weight loss.   Skin: Negative for itching and rash.        Objective:     A focused cutaneous exam was completed including: face, lips and neck with the following pertinent findings listed below. Remaining above-listed examined areas within normal limits / negative for rashes or lesions.    Face clear of acne. Pt reports some scarring.     Exam limited by video and/or photos.     There were no vitals taken for this visit.        Assessment and Plan:     Acne vulgaris - severe, inflammatory, scaring-improving on Accutane  - re-educated patient about diagnosis, management options, and expectations of treatment  -continue accutane at 40mg bid    re-extensively discussed the potential adverse effects of treatment, including, but not limited to, teratogenicity, ocular disturbances, bony abnormailities, lipid disturbances, elevated LFTs/liver inflammation, reported associations with IBD, depression, endocrine disturbances, hematologic abnormalities, and myopathy. More common side effects of dry skin/eyes/mucosa, photosensitivity, sticky skin, hair loss, fragile nails, paronychia, myalgias, fatigue, headache, and abdominal pain/nausea/diarrhea were also reviewed.IPatient is aware he cannot share the medication, and he cannot donate blood during treatment, and through one month after completion of treatment.  - we discussed the need for baseline blood work, and follow-up blood work once treatment is initiated    - pt aware to notify me asap if any new/concerning symptoms  - lubricating eye drops, nasal spray, vaseline  - dose: isotretinoin 80mg daily (divided doses)   - ipledge ID:  [id number]  Goal dose: 120-150mg/kg; 79.4 kg = 9528mg-19460vn  No need for routine labs unless he develops symptoms.  I have performed a physical exam and reviewed and updated ROS and Plan today (3/9/2021). In review of dermatology visit (2/9/2021), there are no changes except as documented above.         Followup: Return in about 1 month (around 4/9/2021) for acne or sooner for concerns.    PHILL Puckett.

## 2021-04-14 ENCOUNTER — TELEMEDICINE (OUTPATIENT)
Dept: BEHAVIORAL HEALTH | Facility: CLINIC | Age: 21
End: 2021-04-14
Payer: COMMERCIAL

## 2021-04-14 VITALS — BODY MASS INDEX: 27.47 KG/M2 | WEIGHT: 175 LBS | HEIGHT: 67 IN

## 2021-04-14 DIAGNOSIS — F32.A DEPRESSIVE DISORDER: ICD-10-CM

## 2021-04-14 DIAGNOSIS — F40.10 SOCIAL PHOBIA: ICD-10-CM

## 2021-04-14 DIAGNOSIS — F41.1 GENERALIZED ANXIETY DISORDER: ICD-10-CM

## 2021-04-14 PROCEDURE — 99214 OFFICE O/P EST MOD 30 MIN: CPT | Mod: 95,CR | Performed by: PSYCHIATRY & NEUROLOGY

## 2021-04-14 PROCEDURE — 96127 BRIEF EMOTIONAL/BEHAV ASSMT: CPT | Mod: 95,CR | Performed by: PSYCHIATRY & NEUROLOGY

## 2021-04-14 RX ORDER — SERTRALINE HYDROCHLORIDE 100 MG/1
100 TABLET, FILM COATED ORAL DAILY
Qty: 90 TABLET | Refills: 0 | Status: SHIPPED | OUTPATIENT
Start: 2021-04-14 | End: 2021-06-28

## 2021-04-14 RX ORDER — BUPROPION HYDROCHLORIDE 100 MG/1
100 TABLET, EXTENDED RELEASE ORAL EVERY MORNING
Qty: 90 TABLET | Refills: 0 | Status: SHIPPED | OUTPATIENT
Start: 2021-04-14 | End: 2021-07-12

## 2021-04-14 ASSESSMENT — PATIENT HEALTH QUESTIONNAIRE - PHQ9
6. FEELING BAD ABOUT YOURSELF - OR THAT YOU ARE A FAILURE OR HAVE LET YOURSELF OR YOUR FAMILY DOWN: NOT AL ALL
SUM OF ALL RESPONSES TO PHQ QUESTIONS 1-9: 10
5. POOR APPETITE OR OVEREATING: MORE THAN HALF THE DAYS
1. LITTLE INTEREST OR PLEASURE IN DOING THINGS: NOT AT ALL
SUM OF ALL RESPONSES TO PHQ9 QUESTIONS 1 AND 2: 0
7. TROUBLE CONCENTRATING ON THINGS, SUCH AS READING THE NEWSPAPER OR WATCHING TELEVISION: MORE THAN HALF THE DAYS
2. FEELING DOWN, DEPRESSED, IRRITABLE, OR HOPELESS: NOT AT ALL
8. MOVING OR SPEAKING SO SLOWLY THAT OTHER PEOPLE COULD HAVE NOTICED. OR THE OPPOSITE, BEING SO FIGETY OR RESTLESS THAT YOU HAVE BEEN MOVING AROUND A LOT MORE THAN USUAL: NOT AT ALL
4. FEELING TIRED OR HAVING LITTLE ENERGY: NEARLY EVERY DAY
3. TROUBLE FALLING OR STAYING ASLEEP OR SLEEPING TOO MUCH: NEARLY EVERY DAY
9. THOUGHTS THAT YOU WOULD BE BETTER OFF DEAD, OR OF HURTING YOURSELF: NOT AT ALL

## 2021-04-14 ASSESSMENT — FIBROSIS 4 INDEX: FIB4 SCORE: 0.51

## 2021-04-14 NOTE — PROGRESS NOTES
PSYCHIATRY VIRTUAL VISIT FOLLOW-UP NOTE      Chief Complaint   Patient presents with   • Follow-Up     depression, anxiety       This evaluation was conducted via Zoom using secure and encrypted videoconferencing technology. The patient was in a private location in the [facility].    The patient's identity was confirmed and verbal consent was obtained for this virtual visit.    History Of Present Illness:  Mike Miller is a 20 y.o. male with depressive disorder, generalized anxiety disorder, social anxiety disorder, sleep apnea on CPAP comes in today for follow up, was last seen over 7 months ago.  He has been doing good in regards to his depression and anxiety since his last visit with me.  He is on the lower dose of Wellbutrin and has not noticed any worsening symptoms.  He continues to work 2 jobs and denies any new psychosocial stressors.  He is looking into going to a plumbing school in the near future.  He is staying active as much as possible.  He is still compliant with his CPAP machine and sleep and energy are better than before.  He denies having thoughts of wanting to hurt himself.    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    Substance Use:  Alcohol - Infrequent alcohol use   Nicotine - Denies  Cannabis - Denies, stopped using cannabis in Dec 2020  Illicit drugs - Denies     Past Medication Trials:  None     Medications:  Current Outpatient Medications   Medication Sig Dispense Refill   • sertraline (ZOLOFT) 100 MG Tab Take 1 tablet by mouth every day. 90 tablet 0   • buPROPion SR (WELLBUTRIN SR) 100 MG TABLET SR 12 HR Take 1 tablet by mouth every morning. 90 tablet 0   • isotretinoin (ACCUTANE) 40 MG capsule Take 1 capsule by mouth 2 times a day. 60 capsule 0     No current facility-administered medications for this visit.       Review Of Systems:    Constitutional - Positive for  "fatigue  Psychiatric - Negative for depression.  Positive for infrequent anxiety    Physical Examination:  Vital signs: Ht 1.702 m (5' 7\")   Wt 79.4 kg (175 lb)   BMI 27.41 kg/m²     Musculoskeletal: No abnormal movements.     Mental Status Evaluation:   General: Young male, dressed in casual attire, good grooming and hygiene, in no apparent distress, calm and cooperative, good eye contact, no psychomotor agitation or retardation   Orientation: Alert and oriented to person, place and time  Recent and remote memory: Grossly intact  Attention span and concentration: Grossly intact  Speech: Spontaneous, normal rate, rhythm and tone  Thought Process: Linear, logical and goal directed  Thought Content: Denies suicidal or homicidal ideations, intent or plan  Perception: Denies auditory or visual hallucinations. No delusions noted  Associations: Intact  Language: Appropriate  Fund of knowledge and vocabulary: Grossly adequate  Mood: \"good\"  Affect: Euthymic, mood congruent  Insight: Good  Judgment: Good    Depression screening:  Depression Screen (PHQ-2/PHQ-9) 10/9/2019 2/10/2020 4/14/2021   PHQ-2 Total Score - 2 0   PHQ-2 Total Score - - -   PHQ-2 Total Score 0 - -   PHQ-9 Total Score - 13 10     Interpretation of PHQ-9 Total Score   Score Severity   1-4 No Depression   5-9 Mild Depression   10-14 Moderate Depression   15-19 Moderately Severe Depression   20-27 Severe Depression    Medical Records/Labs/Diagnostic Tests Reviewed:  [place] PDMP records - no prescribed controlled medications found in the last 2 years       Impression:  1.  Generalized anxiety disorder - stable  2.  Social anxiety disorder - stable  3.  Depressive disorder, unspecified type - stable    Plan:  1.  Continue Zoloft 100 mg daily for anxiety and depression  2.  Decrease Wellbutrin SR to 100 mg in the morning for anxiety and depression.  Will continue to slowly taper him off Wellbutrin given stable symptoms.  3.  He will be continue individual " psychotherapy with Dr. Jordin Chavez, [place] in his private practice, is waiting for a call back from his clinic to schedule an appointment.    Return to clinic in 3 months or sooner if symptoms worsen    The proposed treatment plan was discussed with the patient who was provided the opportunity to ask questions and make suggestions regarding alternative treatment. Patient verbalized understanding and expressed agreement with the plan.     Leona Emerson M.D.  04/14/21    This note was created using voice recognition software (Dragon). The accuracy of the dictation is limited by the abilities of the software. I have reviewed the note prior to signing, however some errors in grammar and context are still possible. If you have any questions related to this note please do not hesitate to contact our office.

## 2021-04-30 ENCOUNTER — OFFICE VISIT (OUTPATIENT)
Dept: DERMATOLOGY | Facility: IMAGING CENTER | Age: 21
End: 2021-04-30
Payer: COMMERCIAL

## 2021-04-30 DIAGNOSIS — L70.0 ACNE VULGARIS: ICD-10-CM

## 2021-04-30 PROCEDURE — 99213 OFFICE O/P EST LOW 20 MIN: CPT | Performed by: NURSE PRACTITIONER

## 2021-04-30 RX ORDER — ISOTRETINOIN 40 MG/1
40 CAPSULE ORAL 2 TIMES DAILY
Qty: 60 CAPSULE | Refills: 0 | Status: SHIPPED | OUTPATIENT
Start: 2021-04-30 | End: 2021-07-15

## 2021-04-30 ASSESSMENT — ENCOUNTER SYMPTOMS
DIAPHORESIS: 0
FEVER: 0
WEIGHT LOSS: 0
CHILLS: 0

## 2021-04-30 NOTE — PROGRESS NOTES
Dermatology Return Patient Visit        Chief Complaint   Patient presents with   • Acne       Subjective:     HPI:   Mike Miller is a 21 y.o. male presenting for    Follow up acne  Started treatment:  Dose:  40 mg BID  No new outbreaks  Completed 5 months   Tolerating well, +dry eyes/skin, no nausea/vomiting/diarrhea, myalgias, arthralgias, mood disturbances      Initial history:  Acne  Started: 9 years of age  Active on: face  Aggravated by: touching face, has to bathe and wash hair every night  Treatment currently used: doxycycline, OTC moisturizer  Prior treatments used: None  Face wash/moisturizer: OTC  Family history of scarring acne: Paternal uncle        No Known Allergies            Review of Systems   Constitutional: Negative for chills, diaphoresis, fever, malaise/fatigue and weight loss.   Skin: Negative for itching and rash.        Objective:     A focused cutaneous exam was completed including: face, lips and neck with the following pertinent findings listed below. Remaining above-listed examined areas within normal limits / negative for rashes or lesions.    Face clear of acne. Pt reports some scarring.       There were no vitals taken for this visit.        Assessment and Plan:     Acne vulgaris - severe, inflammatory, scaring-improving on Accutane  - re-educated patient about diagnosis, management options, and expectations of treatment  -continue accutane at 40mg bid for one more month    re-extensively discussed the potential adverse effects of treatment, including, but not limited to, teratogenicity, ocular disturbances, bony abnormailities, lipid disturbances, elevated LFTs/liver inflammation, reported associations with IBD, depression, endocrine disturbances, hematologic abnormalities, and myopathy. More common side effects of dry skin/eyes/mucosa, photosensitivity, sticky skin, hair loss, fragile nails, paronychia, myalgias, fatigue, headache, and abdominal pain/nausea/diarrhea were  also reviewed.IPatient is aware he cannot share the medication, and he cannot donate blood during treatment, and through one month after completion of treatment.  - we discussed the need for baseline blood work, and follow-up blood work once treatment is initiated    - pt aware to notify me asap if any new/concerning symptoms  - lubricating eye drops, nasal spray, vaseline  - dose: isotretinoin 80mg daily (divided doses)   - ipledge ID: [id number]  Goal dose: 120-150mg/kg; 79.4 kg = 9528mg-13881tf  No need for routine labs unless he develops symptoms.    I have performed a physical exam and reviewed and updated ROS and Plan today (4/30/2021). In review of dermatology visit (2/9/2021) there are no changes except as documented above.       Please note that this dictation was created using voice recognition software. I have made every reasonable attempt to correct obvious errors, but I expect that there are errors of grammar and possibly content that I did not discover before finalizing the note.      Followup: Return if symptoms worsen or fail to improve.    PHILL Puckett.

## 2021-06-28 DIAGNOSIS — F41.1 GENERALIZED ANXIETY DISORDER: ICD-10-CM

## 2021-06-28 RX ORDER — SERTRALINE HYDROCHLORIDE 100 MG/1
100 TABLET, FILM COATED ORAL DAILY
Qty: 90 TABLET | Refills: 0 | Status: SHIPPED | OUTPATIENT
Start: 2021-06-28 | End: 2021-10-14 | Stop reason: SDUPTHER

## 2021-07-10 DIAGNOSIS — F32.A DEPRESSIVE DISORDER: ICD-10-CM

## 2021-07-12 RX ORDER — BUPROPION HYDROCHLORIDE 100 MG/1
100 TABLET, EXTENDED RELEASE ORAL EVERY MORNING
Qty: 90 TABLET | Refills: 0 | Status: SHIPPED | OUTPATIENT
Start: 2021-07-12 | End: 2021-10-14

## 2021-07-15 ENCOUNTER — TELEMEDICINE (OUTPATIENT)
Dept: BEHAVIORAL HEALTH | Facility: CLINIC | Age: 21
End: 2021-07-15
Payer: COMMERCIAL

## 2021-07-15 VITALS — BODY MASS INDEX: 27.41 KG/M2 | HEIGHT: 67 IN

## 2021-07-15 DIAGNOSIS — F40.10 SOCIAL PHOBIA: ICD-10-CM

## 2021-07-15 DIAGNOSIS — F41.1 GENERALIZED ANXIETY DISORDER: ICD-10-CM

## 2021-07-15 DIAGNOSIS — F32.A DEPRESSIVE DISORDER: ICD-10-CM

## 2021-07-15 PROCEDURE — 99214 OFFICE O/P EST MOD 30 MIN: CPT | Mod: 95 | Performed by: PSYCHIATRY & NEUROLOGY

## 2021-07-15 NOTE — PROGRESS NOTES
PSYCHIATRY VIRTUAL VISIT FOLLOW-UP NOTE      Chief Complaint   Patient presents with   • Follow-Up     depression,anxiety       This evaluation was conducted via Zoom using secure and encrypted videoconferencing technology. The patient was in a private location in the [facility].    The patient's identity was confirmed and verbal consent was obtained for this virtual visit.    History Of Present Illness:  Mike Miller is a 21 y.o. male with depressive disorder, generalized anxiety disorder, social anxiety disorder, sleep apnea on CPAP comes in today for follow up, was last seen 3 months ago.  He is doing good in regards to his anxiety and depression.  He was able to tolerate a lower dose of Wellbutrin and has noticed that he is less emotionally numb than before.  He denies any significant struggles with depression or anxiety with the lower dose of Wellbutrin and he is interested in tapering it off.  He has been staying busy at work and is also volunteering on the weekends.  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  He denies any struggles with sleep or appetite.  He denies having thoughts of wanting to hurt himself.    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    Substance Use:  Alcohol - Infrequent alcohol use   Nicotine - Denies  Cannabis - Denies  Illicit drugs - Denies     Past Medication Trials:  None     Medications:  Current Outpatient Medications   Medication Sig Dispense Refill   • buPROPion SR (WELLBUTRIN-SR) 100 MG TABLET SR 12 HR TAKE 1 TABLET BY MOUTH EVERY MORNING. 90 tablet 0   • sertraline (ZOLOFT) 100 MG Tab TAKE 1 TABLET BY MOUTH EVERY DAY 90 tablet 0     No current facility-administered medications for this visit.       Review Of Systems:    Constitutional - Positive for fatigue  Psychiatric - Negative for depression.   "Positive for infrequent anxiety    Physical Examination:  Vital signs: Ht 1.702 m (5' 7\")   BMI 27.41 kg/m²     Musculoskeletal: No abnormal movements.     Mental Status Evaluation:   General: Young male, dressed in casual attire, good grooming and hygiene, in no apparent distress, calm and cooperative, good eye contact, no psychomotor agitation or retardation   Orientation: Alert and oriented to person, place and time  Recent and remote memory: Grossly intact  Attention span and concentration: Grossly intact  Speech: Spontaneous, normal rate, rhythm and tone  Thought Process: Linear, logical and goal directed  Thought Content: Denies suicidal or homicidal ideations, intent or plan  Perception: Denies auditory or visual hallucinations. No delusions noted  Associations: Intact  Language: Appropriate  Fund of knowledge and vocabulary: Grossly adequate  Mood: \"good\"  Affect: Euthymic, mood congruent  Insight: Good  Judgment: Good    Depression screening:  Depression Screen (PHQ-2/PHQ-9) 10/9/2019 2/10/2020 4/14/2021   PHQ-2 Total Score - 2 0   PHQ-2 Total Score - - -   PHQ-2 Total Score 0 - -   PHQ-9 Total Score - 13 10     Interpretation of PHQ-9 Total Score   Score Severity   1-4 No Depression   5-9 Mild Depression   10-14 Moderate Depression   15-19 Moderately Severe Depression   20-27 Severe Depression    Medical Records/Labs/Diagnostic Tests Reviewed:  [place] PDMP records - no prescribed controlled medications found in the last 2 years       Impression:  1.  Generalized anxiety disorder - stable  2.  Social anxiety disorder - stable  3.  Depressive disorder, unspecified type - stable    Plan:  1.  Continue Zoloft 100 mg daily for anxiety and depression  2.  Decrease Wellbutrin SR to 50 mg for 2 weeks and then discontinue given stable symptoms  3.  He was advised to contact me through [employer] if he notices decline in his symptoms once he stops Wellbutrin completely.  4.  He was also advised to contact his previous " therapist Dr. Jordin Chavez Ed.D in his private practice to see if he can restart therapy with him.  If he is unable to continue therapy with him he will call the clinic to set up an appointment with a therapist here.    Return to clinic in 3 months or sooner if symptoms worsen    The proposed treatment plan was discussed with the patient who was provided the opportunity to ask questions and make suggestions regarding alternative treatment. Patient verbalized understanding and expressed agreement with the plan.     Leona Emerson M.D.  07/15/21    This note was created using voice recognition software (Dragon). The accuracy of the dictation is limited by the abilities of the software. I have reviewed the note prior to signing, however some errors in grammar and context are still possible. If you have any questions related to this note please do not hesitate to contact our office.

## 2021-10-14 ENCOUNTER — TELEMEDICINE (OUTPATIENT)
Dept: BEHAVIORAL HEALTH | Facility: CLINIC | Age: 21
End: 2021-10-14
Payer: COMMERCIAL

## 2021-10-14 VITALS — BODY MASS INDEX: 27.41 KG/M2 | HEIGHT: 67 IN

## 2021-10-14 DIAGNOSIS — F41.1 GENERALIZED ANXIETY DISORDER: ICD-10-CM

## 2021-10-14 DIAGNOSIS — F40.10 SOCIAL PHOBIA: ICD-10-CM

## 2021-10-14 DIAGNOSIS — F32.A DEPRESSIVE DISORDER: ICD-10-CM

## 2021-10-14 PROCEDURE — 99214 OFFICE O/P EST MOD 30 MIN: CPT | Mod: 95 | Performed by: PSYCHIATRY & NEUROLOGY

## 2021-10-14 RX ORDER — SERTRALINE HYDROCHLORIDE 100 MG/1
150 TABLET, FILM COATED ORAL DAILY
Qty: 135 TABLET | Refills: 1 | Status: SHIPPED | OUTPATIENT
Start: 2021-10-14 | End: 2022-04-08 | Stop reason: SDUPTHER

## 2021-10-14 NOTE — PROGRESS NOTES
PSYCHIATRY VIRTUAL VISIT FOLLOW-UP NOTE      Chief Complaint   Patient presents with   • Follow-Up     depression, anxiety       This evaluation was conducted via Zoom using secure and encrypted videoconferencing technology. The patient was in a private location in the [facility].    The patient's identity was confirmed and verbal consent was obtained for this virtual visit.    History Of Present Illness:  Mike Miller is a 21 y.o. male with depressive disorder, generalized anxiety disorder, social anxiety disorder, sleep apnea on CPAP comes in today for follow up, was last seen 3 months ago.  He is doing better in regards to his anxiety with Zoloft dose increase.  He was able to taper himself off Wellbutrin but started noticing some struggles with anxiety.  He is feeling a lot better and has not noticed any side effects from the higher dose of Zoloft.  He moved out and is living by himself in an apartment which is going really well for him.  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  He is doing good in regards to his physical health as well.  Denies having thoughts of wanting to hurt himself.    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    Substance Use:  Alcohol - Infrequent alcohol use   Nicotine - Denies  Cannabis - Denies  Illicit drugs - Denies     Past Medication Trials:  Wellbutrin SR (effective)    Medications:  Current Outpatient Medications   Medication Sig Dispense Refill   • sertraline (ZOLOFT) 100 MG Tab TAKE 1 TABLET BY MOUTH EVERY DAY (Patient taking differently: Take 150 mg by mouth every day.) 90 tablet 0     No current facility-administered medications for this visit.       Review Of Systems:    Constitutional - Positive for fatigue  Psychiatric - Negative for depression.  Positive for infrequent anxiety    Physical Examination:  Vital signs: There  "were no vitals taken for this visit.    Musculoskeletal: No abnormal movements.     Mental Status Evaluation:   General: Young male, dressed in casual attire, good grooming and hygiene, in no apparent distress, calm and cooperative, good eye contact, no psychomotor agitation or retardation   Orientation: Alert and oriented to person, place and time  Recent and remote memory: Grossly intact  Attention span and concentration: Grossly intact  Speech: Spontaneous, normal rate, rhythm and tone  Thought Process: Linear, logical and goal directed  Thought Content: Denies suicidal or homicidal ideations, intent or plan  Perception: No delusions noted  Associations: Intact  Language: Appropriate  Fund of knowledge and vocabulary: Grossly adequate  Mood: \"good\"  Affect: Euthymic, mood congruent  Insight: Good  Judgment: Good    Depression screening:  Depression Screen (PHQ-2/PHQ-9) 10/9/2019 2/10/2020 4/14/2021   PHQ-2 Total Score - 2 0   PHQ-2 Total Score - - -   PHQ-2 Total Score 0 - -   PHQ-9 Total Score - 13 10     Interpretation of PHQ-9 Total Score   Score Severity   1-4 No Depression   5-9 Mild Depression   10-14 Moderate Depression   15-19 Moderately Severe Depression   20-27 Severe Depression    Medical Records/Labs/Diagnostic Tests Reviewed:  [place] PDMP records - no prescribed controlled medications found in the last 2 years       Impression:  1.  Generalized anxiety disorder - stable  2.  Social anxiety disorder - stable  3.  Depressive disorder, unspecified type - stable    Plan:  1.  Continue Zoloft 150 mg daily for anxiety and depression    Return to clinic in 6 months or sooner if symptoms worsen    The proposed treatment plan was discussed with the patient who was provided the opportunity to ask questions and make suggestions regarding alternative treatment. Patient verbalized understanding and expressed agreement with the plan.     Leona Emerson M.D.  10/14/21    This note was created using voice recognition " software (Dragon). The accuracy of the dictation is limited by the abilities of the software. I have reviewed the note prior to signing, however some errors in grammar and context are still possible. If you have any questions related to this note please do not hesitate to contact our office.

## 2022-04-08 ENCOUNTER — TELEMEDICINE (OUTPATIENT)
Dept: BEHAVIORAL HEALTH | Facility: CLINIC | Age: 22
End: 2022-04-08
Payer: COMMERCIAL

## 2022-04-08 VITALS — BODY MASS INDEX: 29.82 KG/M2 | HEIGHT: 67 IN | WEIGHT: 190 LBS

## 2022-04-08 DIAGNOSIS — F41.1 GENERALIZED ANXIETY DISORDER: ICD-10-CM

## 2022-04-08 DIAGNOSIS — F32.A DEPRESSIVE DISORDER: ICD-10-CM

## 2022-04-08 DIAGNOSIS — F40.10 SOCIAL PHOBIA: ICD-10-CM

## 2022-04-08 PROCEDURE — 99214 OFFICE O/P EST MOD 30 MIN: CPT | Mod: 95 | Performed by: PSYCHIATRY & NEUROLOGY

## 2022-04-08 PROCEDURE — 96127 BRIEF EMOTIONAL/BEHAV ASSMT: CPT | Mod: 95 | Performed by: PSYCHIATRY & NEUROLOGY

## 2022-04-08 RX ORDER — SERTRALINE HYDROCHLORIDE 100 MG/1
150 TABLET, FILM COATED ORAL DAILY
Qty: 135 TABLET | Refills: 1 | Status: SHIPPED | OUTPATIENT
Start: 2022-04-08 | End: 2022-10-11 | Stop reason: SDUPTHER

## 2022-04-08 ASSESSMENT — PATIENT HEALTH QUESTIONNAIRE - PHQ9
9. THOUGHTS THAT YOU WOULD BE BETTER OFF DEAD, OR OF HURTING YOURSELF: NOT AT ALL
2. FEELING DOWN, DEPRESSED, IRRITABLE, OR HOPELESS: NOT AT ALL
SUM OF ALL RESPONSES TO PHQ9 QUESTIONS 1 AND 2: 1
7. TROUBLE CONCENTRATING ON THINGS, SUCH AS READING THE NEWSPAPER OR WATCHING TELEVISION: NEARLY EVERY DAY
1. LITTLE INTEREST OR PLEASURE IN DOING THINGS: SEVERAL DAYS
5. POOR APPETITE OR OVEREATING: MORE THAN HALF THE DAYS
6. FEELING BAD ABOUT YOURSELF - OR THAT YOU ARE A FAILURE OR HAVE LET YOURSELF OR YOUR FAMILY DOWN: SEVERAL DAYS
SUM OF ALL RESPONSES TO PHQ QUESTIONS 1-9: 8
3. TROUBLE FALLING OR STAYING ASLEEP OR SLEEPING TOO MUCH: SEVERAL DAYS
4. FEELING TIRED OR HAVING LITTLE ENERGY: NOT AT ALL
8. MOVING OR SPEAKING SO SLOWLY THAT OTHER PEOPLE COULD HAVE NOTICED. OR THE OPPOSITE, BEING SO FIGETY OR RESTLESS THAT YOU HAVE BEEN MOVING AROUND A LOT MORE THAN USUAL: NOT AT ALL

## 2022-04-08 NOTE — PROGRESS NOTES
"PSYCHIATRY VIRTUAL VISIT FOLLOW-UP NOTE      Chief Complaint   Patient presents with   • Follow-Up     Depression, anxiety       This evaluation was conducted via Zoom using secure and encrypted videoconferencing technology.   The patient was in their home in the [facility].    The patient's identity was confirmed and verbal consent was obtained for this virtual visit.    History Of Present Illness:  Mike Miller is a 21 y.o. male with depressive disorder, generalized anxiety disorder, social anxiety disorder, sleep apnea on CPAP comes in today for follow up, was last seen 6 months ago.  He has been doing good in regards to both anxiety and depression since his last appointment with me.  He continues to have both his jobs and has been working a lot of overtime as well.  He has noticed some struggles with concentration, forgetting things skills and things which appears to be more related to having a busy life.  He denies any other psychosocial stressors.  He continues to see his friends and family on a regular basis.  He has been compliant with Zoloft and denies any side effects.  He denies any struggles with anger.  He denies having thoughts of wanting to hurt himself.    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    Substance Use:  Alcohol - Infrequent alcohol use   Nicotine - Denies  Cannabis - Denies  Illicit drugs - Denies     Past Medication Trials:  Wellbutrin SR (effective)    Medications:  Current Outpatient Medications   Medication Sig Dispense Refill   • sertraline (ZOLOFT) 100 MG Tab Take 1.5 Tablets by mouth every day. 135 Tablet 1     No current facility-administered medications for this visit.       Review Of Systems:    Constitutional - Positive for fatigue  Psychiatric - Negative for depression.  Positive for infrequent anxiety    Physical Examination:  Vital signs: Ht 1.702 m (5' 7\")  " " Wt 86.2 kg (190 lb) Comment: patient reported  BMI 29.76 kg/m²     Musculoskeletal: No abnormal movements.     Mental Status Evaluation:   General: Young male, dressed in casual attire, good grooming and hygiene, in no apparent distress, calm and cooperative, good eye contact, no psychomotor agitation or retardation   Orientation: Alert and oriented to person, place and time  Recent and remote memory: Grossly intact  Attention span and concentration: Grossly intact  Speech: Spontaneous, normal rate, rhythm and tone  Thought Process: Linear, logical and goal directed  Thought Content: Denies suicidal or homicidal ideations, intent or plan  Perception: No delusions noted  Associations: Intact  Language: Appropriate  Fund of knowledge and vocabulary: Grossly adequate  Mood: \"good\"  Affect: Euthymic, mood congruent  Insight: Good  Judgment: Good    Depression screening:  Depression Screen (PHQ-2/PHQ-9) 2/10/2020 4/14/2021 4/8/2022   PHQ-2 Total Score 2 0 1   PHQ-2 Total Score - - -   PHQ-2 Total Score - - -   PHQ-9 Total Score 13 10 8     Interpretation of PHQ-9 Total Score   Score Severity   1-4 No Depression   5-9 Mild Depression   10-14 Moderate Depression   15-19 Moderately Severe Depression   20-27 Severe Depression    Medical Records/Labs/Diagnostic Tests Reviewed:  [place] PD[place] records - no prescribed controlled medications found in the last 2 years       Impression:  1.  Generalized anxiety disorder - stable  2.  Social anxiety disorder - stable  3.  Depressive disorder, unspecified type - stable    Plan:  1.  Continue Zoloft 150 mg daily for anxiety and depression  2.  He was advised to set up an appointment with Dr. Chavez EdZandraD in his private practice    Return to clinic in 6 months or sooner if symptoms worsen    The proposed treatment plan was discussed with the patient who was provided the opportunity to ask questions and make suggestions regarding alternative treatment. Patient verbalized understanding and " expressed agreement with the plan.     Leona Emerson M.D.  04/08/22    This note was created using voice recognition software (Dragon). The accuracy of the dictation is limited by the abilities of the software. I have reviewed the note prior to signing, however some errors in grammar and context are still possible. If you have any questions related to this note please do not hesitate to contact our office.

## 2022-05-26 ENCOUNTER — OFFICE VISIT (OUTPATIENT)
Dept: URGENT CARE | Facility: CLINIC | Age: 22
End: 2022-05-26
Payer: COMMERCIAL

## 2022-05-26 ENCOUNTER — HOSPITAL ENCOUNTER (OUTPATIENT)
Facility: MEDICAL CENTER | Age: 22
End: 2022-05-26
Attending: PHYSICIAN ASSISTANT
Payer: COMMERCIAL

## 2022-05-26 VITALS
DIASTOLIC BLOOD PRESSURE: 74 MMHG | HEART RATE: 99 BPM | RESPIRATION RATE: 16 BRPM | OXYGEN SATURATION: 97 % | WEIGHT: 196 LBS | BODY MASS INDEX: 31.5 KG/M2 | TEMPERATURE: 97.3 F | SYSTOLIC BLOOD PRESSURE: 122 MMHG | HEIGHT: 66 IN

## 2022-05-26 DIAGNOSIS — Z20.822 SUSPECTED COVID-19 VIRUS INFECTION: ICD-10-CM

## 2022-05-26 DIAGNOSIS — B34.9 VIRAL ILLNESS: ICD-10-CM

## 2022-05-26 LAB
COVID ORDER STATUS COVID19: NORMAL
SARS-COV-2 RNA RESP QL NAA+PROBE: NOTDETECTED
SPECIMEN SOURCE: NORMAL

## 2022-05-26 PROCEDURE — U0005 INFEC AGEN DETEC AMPLI PROBE: HCPCS

## 2022-05-26 PROCEDURE — U0003 INFECTIOUS AGENT DETECTION BY NUCLEIC ACID (DNA OR RNA); SEVERE ACUTE RESPIRATORY SYNDROME CORONAVIRUS 2 (SARS-COV-2) (CORONAVIRUS DISEASE [COVID-19]), AMPLIFIED PROBE TECHNIQUE, MAKING USE OF HIGH THROUGHPUT TECHNOLOGIES AS DESCRIBED BY CMS-2020-01-R: HCPCS

## 2022-05-26 PROCEDURE — 99213 OFFICE O/P EST LOW 20 MIN: CPT | Mod: CS | Performed by: PHYSICIAN ASSISTANT

## 2022-05-26 NOTE — LETTER
May 26, 2022         Patient: Mike Miller   YOB: 2000   Date of Visit: 5/26/2022     Your employee/student was seen in our clinic today.  A concern for COVID-19 has been identified and testing is in progress. We are asking you to excuse absences while following self-isolation protocol per Center for Disease Control (CDC) guidelines.  Your employee will be able to access test results through our electronic delivery system called [employer].    If the results of testing are positive, your employee should follow the CDC guidelines.  These are isolation for a minimum of 5 days and at least 24 hours have passed since your last fever without the use of fever-reducing medications and all other symptoms have improved.  In addition, it is recommended you wear a mask for an additional 5 days. The health department may contact you and provide further directions regarding self-isolation and return to work.    Negative result without close contact*:  If your employee was tested due to their symptoms without close contact to someone with COVID-19 and their test is negative: your employee should not return to work or regular activities until 24 hours after symptoms fully improve. (For example, if patient feels back to normal on Tuesday, should remain isolated through Wednesday).     Negative with close contact*:  If your employee was tested due to close contact to someone, they should self isolate for 5 days after their exposure.    Negative with positive household member  If your employee was due to a positive household member they should still quarantine for a period of 5 days.  The 5 day period begins once the household member is isolated. If unable to quarantine (for example mom from infant), the CDC advises an additional 5-day quarantine period from the COVID-19 household member.  In general, repeat testing is not necessary and not offered through our [facility] urgent care.    This is the only note that will  be provided from [employer] for this visit.  Your employee will require an appointment with a primary care provider if FMLA or disability forms are required.      Sincerely,    Flakito Allen P.A.-C.

## 2022-05-26 NOTE — PROGRESS NOTES
"Subjective:   Mike Miller is a 22 y.o. male who presents for Cough (Chest tightness x '' 1.5 week '')      HPI  Patient is a 22-year-old male who presents to clinic with URI symptoms onset 1.5 weeks ago.  2 days ago, he felt fine and then his symptoms returned last night.  He complains of a sore throat, nasal congestion, headache.  The cough is productive.  Chest burning with coughing.  Intermittent dyspnea.  No muscle aches and fatigue. Denies any fever, night sweats, chills, sneezing, ear pain, nausea, vomiting. COVID vaccinated. No influenza vaccine.       Medications:    • sertraline Tabs    Allergies: Patient has no known allergies.    Problem List: Mike Miller does not have any pertinent problems on file.    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 He smoked 0.00 packs per day. He has never used smokeless tobacco. He reports current alcohol use. He reports previous drug use.     Past Family Hx:  Mike Miller family history includes Depression in his father and mother; Heart Disease in his father; Hypertension in his father; Other in his father.     Problem list, medications, and allergies reviewed by myself today in Epic.     Objective:     /74   Pulse 99   Temp 36.3 °C (97.3 °F) (Temporal)   Resp 16   Ht 1.676 m (5' 6\")   Wt 88.9 kg (196 lb)   SpO2 97%   BMI 31.64 kg/m²     Physical Exam  Vitals reviewed.   Constitutional:       General: He is not in acute distress.     Appearance: Normal appearance. He is not ill-appearing or toxic-appearing.   HENT:      Head: Normocephalic.      Right Ear: Tympanic membrane normal.      Left Ear: Tympanic membrane normal.      " Nose: Congestion present.      Mouth/Throat:      Mouth: Mucous membranes are moist.      Pharynx: Oropharynx is clear. Posterior oropharyngeal erythema present. No oropharyngeal exudate.   Eyes:      Conjunctiva/sclera: Conjunctivae normal.      Pupils: Pupils are equal, round, and reactive to light.   Cardiovascular:      Rate and Rhythm: Normal rate and regular rhythm.      Heart sounds: Normal heart sounds.   Pulmonary:      Effort: Pulmonary effort is normal. No respiratory distress.      Breath sounds: Normal breath sounds. No wheezing, rhonchi or rales.   Musculoskeletal:      Cervical back: Neck supple.   Lymphadenopathy:      Cervical: No cervical adenopathy.   Skin:     General: Skin is warm and dry.   Neurological:      General: No focal deficit present.      Mental Status: He is alert and oriented to person, place, and time.   Psychiatric:         Mood and Affect: Mood normal.         Behavior: Behavior normal.         Diagnosis and associated orders:     1. Viral illness  - SARS-CoV-2 PCR (24 hour In-House): Collect NP swab in VTM; Future    2. Suspected COVID-19 virus infection  - SARS-CoV-2 PCR (24 hour In-House): Collect NP swab in VTM; Future       Comments/MDM:     The patient's presenting symptoms and exam findings most likely are due to a viral etiology.   Test for COVID-19 via PCR. Result will be reviewed by myself. We will call/message back for results and appropriate further instructions. Instructed to sign up for "[employer]t if they have not already. Result will be automatically released to [employer] application for patient review. I will be sending a message with Next Step Instructions to [employer] soon after resulted.   Symptomatic and supportive care:   Plenty of oral hydration and rest   Over the counter cough suppressant as directed.  Tylenol or ibuprofen for pain and fever as directed.   Warm salt water gargles for sore throat, soft foods, cool liquids.   Saline nasal spray and Flonase as a  decongestant.   Infection control measures at home. Stay away from people, Hand washing, covering sneeze/cough, disinfect surfaces.   Remain home from work, school, and other populated environments. Work note provided with information of quarantine measures per CDC guidelines.   Overall, the patient is well-appearing. They are not hypoxic, afebrile, and a normal pulmonary exam.       I personally reviewed prior external notes and test results pertinent to today's visit. Supportive care, natural history, differential diagnoses, and indications for immediate follow-up discussed. Patient expresses understanding and agrees to plan. Patient denies any other questions or concerns.     Follow-up with the primary care physician for recheck, reevaluation, and consideration of further management.    Please note that this dictation was created using voice recognition software. I have made a reasonable attempt to correct obvious errors, but I expect that there are errors of grammar and possibly content that I did not discover before finalizing the note.    This note was electronically signed by Flakito Allen PA-C

## 2022-10-11 ENCOUNTER — TELEMEDICINE (OUTPATIENT)
Dept: BEHAVIORAL HEALTH | Facility: CLINIC | Age: 22
End: 2022-10-11
Payer: COMMERCIAL

## 2022-10-11 DIAGNOSIS — F40.10 SOCIAL PHOBIA: ICD-10-CM

## 2022-10-11 DIAGNOSIS — F41.1 GENERALIZED ANXIETY DISORDER: ICD-10-CM

## 2022-10-11 DIAGNOSIS — F32.A DEPRESSIVE DISORDER: ICD-10-CM

## 2022-10-11 PROCEDURE — 99214 OFFICE O/P EST MOD 30 MIN: CPT | Mod: 95 | Performed by: PSYCHIATRY & NEUROLOGY

## 2022-10-11 RX ORDER — SERTRALINE HYDROCHLORIDE 100 MG/1
150 TABLET, FILM COATED ORAL DAILY
Qty: 135 TABLET | Refills: 1 | Status: SHIPPED | OUTPATIENT
Start: 2022-10-11 | End: 2023-06-20 | Stop reason: SDUPTHER

## 2022-10-11 NOTE — PROGRESS NOTES
PSYCHIATRY VIRTUAL VISIT FOLLOW-UP NOTE      Chief Complaint   Patient presents with    Follow-Up     Depression, anxiety     This evaluation was conducted via Zoom using secure and encrypted videoconferencing technology.   The patient was in their home in the [facility].    The patient's identity was confirmed and verbal consent was obtained for this virtual visit.    History Of Present Illness:  Mike Miller is a 21 y.o. male with depressive disorder, generalized anxiety disorder, social anxiety disorder, sleep apnea on CPAP comes in today for follow up, was last seen 6 months ago.  He had some struggles with depression for a while mainly related to work-related stressors.  He was able to recognize and talk to his supervisor who made some changes and things have been better since then.  He is back going to the gym and is seeing his friends and family on a regular basis.  He maintains good compliance with Zoloft and denies any side effects.  He is doing good in regards to anxiety.  He denies having thoughts of wanting to hurt himself.    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    Substance Use:  Alcohol - Drinks in social settings on the weekends   Nicotine - Denies  Cannabis - Denies  Illicit drugs - Denies     Past Medication Trials:  Wellbutrin SR (effective)    Medications:  Current Outpatient Medications   Medication Sig Dispense Refill    sertraline (ZOLOFT) 100 MG Tab Take 1.5 Tablets by mouth every day. 135 Tablet 1     No current facility-administered medications for this visit.       Review Of Systems:    Psychiatric - Negative for depression.  Positive for infrequent anxiety    Physical Examination:  Vital signs: There were no vitals taken for this visit.    Musculoskeletal: No abnormal movements.     Mental Status Evaluation:   General: Young male, dressed in casual attire,  "good grooming and hygiene, in no apparent distress, calm and cooperative, good eye contact, no psychomotor agitation or retardation   Orientation: Alert and oriented to person, place and time  Recent and remote memory: Grossly intact  Attention span and concentration: Grossly intact  Speech: Spontaneous, normal rate, rhythm and tone  Thought Process: Linear, logical and goal directed  Thought Content: Denies suicidal or homicidal ideations, intent or plan  Perception: No delusions noted  Associations: Intact  Language: Appropriate  Fund of knowledge and vocabulary: Grossly adequate  Mood: \"good\"  Affect: Euthymic, mood congruent  Insight: Good  Judgment: Good    Depression screening:  Depression Screen (PHQ-2/PHQ-9) 2/10/2020 4/14/2021 4/8/2022   PHQ-2 Total Score 2 0 1   PHQ-2 Total Score - - -   PHQ-2 Total Score - - -   PHQ-9 Total Score 13 10 8     Interpretation of PHQ-9 Total Score   Score Severity   1-4 No Depression   5-9 Mild Depression   10-14 Moderate Depression   15-19 Moderately Severe Depression   20-27 Severe Depression    Medical Records/Labs/Diagnostic Tests Reviewed:  [place] PDMP records - no prescribed controlled medications found in the last 2 years       Impression:  1.  Generalized anxiety disorder - stable  2.  Social anxiety disorder - stable  3.  Depressive disorder, unspecified type - stable    Plan:  1.  Continue Zoloft 150 mg daily for anxiety and depression  2.  Discussed bright light therapy for seasonal depression and he will look into this, advised him to get a light box at 10,000 lx and to use it for 30 minutes in the morning through the winter months    Return to clinic in 6 months or sooner if symptoms worsen    The proposed treatment plan was discussed with the patient who was provided the opportunity to ask questions and make suggestions regarding alternative treatment. Patient verbalized understanding and expressed agreement with the plan.     Leona Emerson M.D.  10/11/22    This " note was created using voice recognition software (Dragon). The accuracy of the dictation is limited by the abilities of the software. I have reviewed the note prior to signing, however some errors in grammar and context are still possible. If you have any questions related to this note please do not hesitate to contact our office.

## 2022-12-14 ENCOUNTER — OFFICE VISIT (OUTPATIENT)
Dept: URGENT CARE | Facility: CLINIC | Age: 22
End: 2022-12-14
Payer: COMMERCIAL

## 2022-12-14 VITALS
BODY MASS INDEX: 31.39 KG/M2 | RESPIRATION RATE: 16 BRPM | WEIGHT: 200 LBS | TEMPERATURE: 98.8 F | OXYGEN SATURATION: 98 % | SYSTOLIC BLOOD PRESSURE: 118 MMHG | DIASTOLIC BLOOD PRESSURE: 72 MMHG | HEIGHT: 67 IN | HEART RATE: 94 BPM

## 2022-12-14 DIAGNOSIS — J01.00 ACUTE MAXILLARY SINUSITIS, RECURRENCE NOT SPECIFIED: ICD-10-CM

## 2022-12-14 PROCEDURE — 99213 OFFICE O/P EST LOW 20 MIN: CPT | Performed by: PHYSICIAN ASSISTANT

## 2022-12-14 RX ORDER — DEXTROMETHORPHAN HYDROBROMIDE AND PROMETHAZINE HYDROCHLORIDE 15; 6.25 MG/5ML; MG/5ML
5 SYRUP ORAL EVERY 4 HOURS PRN
Qty: 120 ML | Refills: 0 | Status: SHIPPED | OUTPATIENT
Start: 2022-12-14 | End: 2023-01-13

## 2022-12-14 RX ORDER — AMOXICILLIN AND CLAVULANATE POTASSIUM 875; 125 MG/1; MG/1
1 TABLET, FILM COATED ORAL 2 TIMES DAILY
Qty: 20 TABLET | Refills: 0 | Status: SHIPPED | OUTPATIENT
Start: 2022-12-14 | End: 2023-01-13

## 2022-12-14 RX ORDER — FLUTICASONE PROPIONATE 50 MCG
2 SPRAY, SUSPENSION (ML) NASAL DAILY
Qty: 16 G | Refills: 0 | Status: SHIPPED | OUTPATIENT
Start: 2022-12-14 | End: 2023-01-18

## 2022-12-14 ASSESSMENT — ENCOUNTER SYMPTOMS
CHILLS: 0
DIARRHEA: 0
NAUSEA: 0
FEVER: 0
ABDOMINAL PAIN: 0
SINUS PAIN: 1
SHORTNESS OF BREATH: 0
VOMITING: 0
WHEEZING: 0
SORE THROAT: 1
COUGH: 1
SPUTUM PRODUCTION: 1

## 2022-12-14 NOTE — PROGRESS NOTES
"Subjective:   Mike Miller  is a 22 y.o. male who presents for Sinus Problem (Sinus congestion , teeth hurt , headaches and deep chest cough x 2 weeks , has tested negative for covid at home )      Sinus Problem  This is a new problem. The current episode started in the past 7 days. Associated symptoms include congestion, coughing and a sore throat. Pertinent negatives include no chills, ear pain or shortness of breath.     Patient resents urgent care noting last 10 to 14 days of sinus pressure and congestion.  Notes pain to left maxillary and left frontal sinuses.  Describes pressure pain pushing on dentition on left side as well as.  Patient has had some sinus headaches.  Notes productive cough.  Patient does have a history of asthma and has needed MDI more than usual.  Patient reports having had fever chills and body aches at onset of symptoms but has since resolved.  Denies ear pain.  Denies vomiting abdominal pain or diarrhea.  Denies history of adulthood pneumonia but did have twice as a child.  Has had COVID in the past year.  Patient has tried treatment with Sudafed thus far.  Patient has had negative COVID tests over the interim symptoms.    Review of Systems   Constitutional:  Negative for chills and fever (resolved).   HENT:  Positive for congestion, sinus pain and sore throat. Negative for ear pain.    Respiratory:  Positive for cough and sputum production. Negative for shortness of breath and wheezing.    Gastrointestinal:  Negative for abdominal pain, diarrhea, nausea and vomiting.   Skin:  Negative for rash.     No Known Allergies     Objective:   /72 (BP Location: Left arm, Patient Position: Sitting, BP Cuff Size: Adult)   Pulse 94   Temp 37.1 °C (98.8 °F) (Temporal)   Resp 16   Ht 1.702 m (5' 7\")   Wt 90.7 kg (200 lb)   SpO2 98%   BMI 31.32 kg/m²     Physical Exam  Vitals and nursing note reviewed.   Constitutional:       General: He is not in acute distress.     Appearance: " Normal appearance. He is well-developed. He is not diaphoretic.   HENT:      Head: Normocephalic and atraumatic.      Right Ear: Ear canal and external ear normal. Tympanic membrane is bulging. Tympanic membrane is not erythematous.      Left Ear: Ear canal and external ear normal. Tympanic membrane is bulging. Tympanic membrane is not erythematous.      Nose:      Right Sinus: Maxillary sinus tenderness and frontal sinus tenderness present.      Left Sinus: Maxillary sinus tenderness and frontal sinus tenderness present.      Mouth/Throat:      Lips: Pink.      Mouth: Mucous membranes are moist.      Pharynx: Uvula midline. Posterior oropharyngeal erythema ( PND) present. No oropharyngeal exudate or uvula swelling.      Tonsils: No tonsillar abscesses.   Eyes:      General: No scleral icterus.        Right eye: No discharge.         Left eye: No discharge.      Conjunctiva/sclera: Conjunctivae normal.   Pulmonary:      Effort: Pulmonary effort is normal. No respiratory distress.      Breath sounds: Normal breath sounds. No stridor. No decreased breath sounds, wheezing, rhonchi or rales.   Musculoskeletal:         General: Normal range of motion.      Cervical back: Neck supple.   Skin:     General: Skin is warm and dry.      Coloration: Skin is not pale.   Neurological:      Mental Status: He is alert and oriented to person, place, and time.      Coordination: Coordination normal.       Assessment/Plan:   1. Acute maxillary sinusitis, recurrence not specified  - amoxicillin-clavulanate (AUGMENTIN) 875-125 MG Tab; Take 1 Tablet by mouth 2 times a day.  Dispense: 20 Tablet; Refill: 0  - promethazine-dextromethorphan (PROMETHAZINE-DM) 6.25-15 MG/5ML syrup; Take 5 mL by mouth every four hours as needed for Cough.  Dispense: 120 mL; Refill: 0  - fluticasone (FLONASE) 50 MCG/ACT nasal spray; Administer 2 Sprays into affected nostril(S) every day.  Dispense: 16 g; Refill: 0  Supportive care is reviewed with  patient/caregiver - recommend to push PO fluids and electrolytes, Nsaids/tylenol, netti pot/saline irrig, humidifier in home, flonase, ponaris,  take full course of Rx, take with probiotics, observe for resolution  Return to clinic with lack of resolution or progression of symptoms.  Cautioned regarding potential for sedation with medication.      I have worn an N95 mask, gloves and eye protection for the entire encounter with this patient.     Differential diagnosis, natural history, supportive care, and indications for immediate follow-up discussed.

## 2022-12-14 NOTE — LETTER
December 14, 2022       Patient: Mike Miller   YOB: 2000   Date of Visit: 12/14/2022         To Whom It May Concern:    In my medical opinion, I recommend that Mike Miller should be excused from work for Monday through Wednesday of this week.      If you have any questions or concerns, please don't hesitate to call [phone]          Sincerely,          Virgil Goodman P.A.-C.  Electronically Signed

## 2023-01-12 ENCOUNTER — TELEPHONE (OUTPATIENT)
Dept: SCHEDULING | Facility: IMAGING CENTER | Age: 23
End: 2023-01-12

## 2023-01-13 ENCOUNTER — OFFICE VISIT (OUTPATIENT)
Dept: URGENT CARE | Facility: CLINIC | Age: 23
End: 2023-01-13
Payer: COMMERCIAL

## 2023-01-13 VITALS
RESPIRATION RATE: 18 BRPM | BODY MASS INDEX: 32.14 KG/M2 | TEMPERATURE: 98.1 F | OXYGEN SATURATION: 99 % | SYSTOLIC BLOOD PRESSURE: 136 MMHG | WEIGHT: 200 LBS | HEIGHT: 66 IN | HEART RATE: 79 BPM | DIASTOLIC BLOOD PRESSURE: 82 MMHG

## 2023-01-13 DIAGNOSIS — J32.9 RECURRENT SINUSITIS: ICD-10-CM

## 2023-01-13 DIAGNOSIS — J45.21 MILD INTERMITTENT ASTHMA WITH ACUTE EXACERBATION: ICD-10-CM

## 2023-01-13 PROCEDURE — 94640 AIRWAY INHALATION TREATMENT: CPT | Performed by: PHYSICIAN ASSISTANT

## 2023-01-13 PROCEDURE — 99214 OFFICE O/P EST MOD 30 MIN: CPT | Mod: 25 | Performed by: PHYSICIAN ASSISTANT

## 2023-01-13 RX ORDER — DOXYCYCLINE HYCLATE 100 MG
100 TABLET ORAL 2 TIMES DAILY
Qty: 14 TABLET | Refills: 0 | Status: SHIPPED | OUTPATIENT
Start: 2023-01-13 | End: 2023-01-20

## 2023-01-13 RX ORDER — IPRATROPIUM BROMIDE AND ALBUTEROL SULFATE 2.5; .5 MG/3ML; MG/3ML
3 SOLUTION RESPIRATORY (INHALATION) ONCE
Status: COMPLETED | OUTPATIENT
Start: 2023-01-13 | End: 2023-01-13

## 2023-01-13 RX ORDER — METHYLPREDNISOLONE 4 MG/1
TABLET ORAL
Qty: 21 TABLET | Refills: 0 | Status: SHIPPED | OUTPATIENT
Start: 2023-01-13 | End: 2023-06-20

## 2023-01-13 RX ADMIN — IPRATROPIUM BROMIDE AND ALBUTEROL SULFATE 3 ML: 2.5; .5 SOLUTION RESPIRATORY (INHALATION) at 18:00

## 2023-01-14 NOTE — PROGRESS NOTES
Subjective     Mike Miller is a 22 y.o. male who presents with Cough (X 2 cough, SOB, fatigue, body aches)    HPI:  Mike Miller is a 22 y.o. male who presents today for evaluation of sinusitis and cough. Patient was seen on 12/14/2022 for evaluation of sinus congestion.  At that time he had had symptoms for approximately 2 weeks.  He was treated for sinusitis with a 10-day course of Augmentin.  He was also prescribed fluticasone nasal spray as well as some cough medication for symptom relief.  Patient reports that he took the antibiotics as prescribed and symptoms got a little bit better but never fully went away.  Over the past couple of days it seems as if symptoms are worsening again and he continues to have sinus headaches, worse on the left side.  He is also having a cough with shortness of breath, wheezing, fatigue, and body aches.  He has not had any fever.  He does have a history of pneumonia and asthma.      Review of Systems   Constitutional:  Positive for malaise/fatigue. Negative for chills and fever.   HENT:  Positive for congestion and sinus pain. Negative for ear pain and sore throat.    Eyes:  Negative for blurred vision and pain.   Respiratory:  Positive for cough, sputum production, shortness of breath and wheezing.    Cardiovascular:  Negative for chest pain and palpitations.   Gastrointestinal:  Negative for abdominal pain, diarrhea, nausea and vomiting.   Musculoskeletal:  Positive for myalgias.   Skin:  Negative for rash.   Neurological:  Positive for headaches. Negative for dizziness.       PMH:  has a past medical history of Anxiety, Asthma in remission, Back pain, Cold (12/21/2017), Depression, GERD (gastroesophageal reflux disease), Panic disorder, Pneumonia, Psychiatric problem (12/2017), and Tic disorder.    He has no past medical history of Self-injurious behavior, Substance abuse (HCC), Suicide attempt (HCC), Withdrawal symptoms, alcohol (HCC), or Withdrawal symptoms, drug  "or narcotic ([facility]).  MEDS:   Current Outpatient Medications:     doxycycline (VIBRAMYCIN) 100 MG Tab, Take 1 Tablet by mouth 2 times a day for 7 days., Disp: 14 Tablet, Rfl: 0    methylPREDNISolone (MEDROL DOSEPAK) 4 MG Tablet Therapy Pack, Follow schedule on package instructions., Disp: 21 Tablet, Rfl: 0    sertraline (ZOLOFT) 100 MG Tab, Take 1.5 Tablets by mouth every day., Disp: 135 Tablet, Rfl: 1    fluticasone (FLONASE) 50 MCG/ACT nasal spray, Administer 2 Sprays into affected nostril(S) every day. 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 His smoking use included cigarettes. He has never used smokeless tobacco. He reports current alcohol use. He reports that he does not currently use drugs.  FH: Family history was reviewed, no pertinent findings to report      Objective     /82   Pulse 79   Temp 36.7 °C (98.1 °F) (Temporal)   Resp 18   Ht 1.676 m (5' 6\")   Wt 90.7 kg (200 lb)   SpO2 99%   BMI 32.28 kg/m²      Physical Exam  Constitutional:       Appearance: He is well-developed.   HENT:      Head: Normocephalic and atraumatic.      Right Ear: Tympanic membrane, ear canal and external ear normal.      Left Ear: Tympanic membrane, ear canal and external ear normal.      Nose: Mucosal edema and congestion present. No rhinorrhea.      Right Sinus: No maxillary sinus tenderness or frontal sinus tenderness.      Left Sinus: Maxillary sinus tenderness and frontal sinus tenderness present.      Mouth/Throat:      Lips: Pink.      Mouth: Mucous membranes are moist.      Pharynx: Oropharynx is clear.   Eyes:      Conjunctiva/sclera: " Conjunctivae normal.      Pupils: Pupils are equal, round, and reactive to light.   Cardiovascular:      Rate and Rhythm: Normal rate and regular rhythm.      Heart sounds: Normal heart sounds. No murmur heard.  Pulmonary:      Effort: Pulmonary effort is normal.      Breath sounds: Wheezing present.   Musculoskeletal:      Cervical back: Normal range of motion.   Lymphadenopathy:      Cervical: No cervical adenopathy.   Skin:     General: Skin is warm and dry.      Capillary Refill: Capillary refill takes less than 2 seconds.   Neurological:      Mental Status: He is alert and oriented to person, place, and time.   Psychiatric:         Behavior: Behavior normal.         Judgment: Judgment normal.       Patient reported feeling some improvement in her shortness of breath status post nebulizer treatment.  Wheezing was no longer auscultated.      Assessment & Plan     1. Mild intermittent asthma with acute exacerbation  - ipratropium-albuterol (DUONEB) nebulizer solution  - methylPREDNISolone (MEDROL DOSEPAK) 4 MG Tablet Therapy Pack; Follow schedule on package instructions.  Dispense: 21 Tablet; Refill: 0    2. Recurrent sinusitis  - doxycycline (VIBRAMYCIN) 100 MG Tab; Take 1 Tablet by mouth 2 times a day for 7 days.  Dispense: 14 Tablet; Refill: 0  - methylPREDNISolone (MEDROL DOSEPAK) 4 MG Tablet Therapy Pack; Follow schedule on package instructions.  Dispense: 21 Tablet; Refill: 0    Patient presenting with what appears to be asthma exacerbation but he is also having left-sided sinus tenderness consistent with possible bacterial sinusitis.  He was initially treated with a 10-day course of Augmentin and symptoms did not fully resolve.  We will now treat with doxycycline.  Medrol Dosepak also prescribed to help with both the sinusitis and the asthma exacerbation.  He says he has albuterol inhalers that he can use at home.  Recommend close follow-up with primary care provider next week for reevaluation.  Return  sooner for new or worsening symptoms.              Differential Diagnosis, natural history, and supportive care discussed. Return to the Urgent Care or follow up with your PCP if symptoms fail to resolve, or for any new or worsening symptoms. Emergency room precautions discussed. Patient and/or family appears understanding of information.

## 2023-01-16 ENCOUNTER — APPOINTMENT (OUTPATIENT)
Dept: MEDICAL GROUP | Facility: MEDICAL CENTER | Age: 23
End: 2023-01-16
Payer: COMMERCIAL

## 2023-01-18 ENCOUNTER — OFFICE VISIT (OUTPATIENT)
Dept: MEDICAL GROUP | Facility: MEDICAL CENTER | Age: 23
End: 2023-01-18
Payer: COMMERCIAL

## 2023-01-18 VITALS
HEIGHT: 66 IN | HEART RATE: 74 BPM | WEIGHT: 208.8 LBS | TEMPERATURE: 98.5 F | DIASTOLIC BLOOD PRESSURE: 74 MMHG | OXYGEN SATURATION: 97 % | BODY MASS INDEX: 33.56 KG/M2 | SYSTOLIC BLOOD PRESSURE: 112 MMHG

## 2023-01-18 DIAGNOSIS — J45.20 MILD INTERMITTENT ASTHMA WITHOUT COMPLICATION: ICD-10-CM

## 2023-01-18 DIAGNOSIS — F32.A DEPRESSIVE DISORDER: ICD-10-CM

## 2023-01-18 DIAGNOSIS — F41.1 GENERALIZED ANXIETY DISORDER: ICD-10-CM

## 2023-01-18 DIAGNOSIS — J01.00 ACUTE NON-RECURRENT MAXILLARY SINUSITIS: ICD-10-CM

## 2023-01-18 DIAGNOSIS — Z23 NEED FOR VACCINATION: ICD-10-CM

## 2023-01-18 DIAGNOSIS — E66.9 OBESITY (BMI 30-39.9): ICD-10-CM

## 2023-01-18 DIAGNOSIS — J30.2 SEASONAL ALLERGIC RHINITIS, UNSPECIFIED TRIGGER: ICD-10-CM

## 2023-01-18 PROCEDURE — 90472 IMMUNIZATION ADMIN EACH ADD: CPT | Performed by: STUDENT IN AN ORGANIZED HEALTH CARE EDUCATION/TRAINING PROGRAM

## 2023-01-18 PROCEDURE — 99213 OFFICE O/P EST LOW 20 MIN: CPT | Mod: 25 | Performed by: STUDENT IN AN ORGANIZED HEALTH CARE EDUCATION/TRAINING PROGRAM

## 2023-01-18 PROCEDURE — 90732 PPSV23 VACC 2 YRS+ SUBQ/IM: CPT | Performed by: STUDENT IN AN ORGANIZED HEALTH CARE EDUCATION/TRAINING PROGRAM

## 2023-01-18 PROCEDURE — 90686 IIV4 VACC NO PRSV 0.5 ML IM: CPT | Performed by: STUDENT IN AN ORGANIZED HEALTH CARE EDUCATION/TRAINING PROGRAM

## 2023-01-18 PROCEDURE — 90471 IMMUNIZATION ADMIN: CPT | Performed by: STUDENT IN AN ORGANIZED HEALTH CARE EDUCATION/TRAINING PROGRAM

## 2023-01-18 RX ORDER — ALBUTEROL SULFATE 90 UG/1
2 AEROSOL, METERED RESPIRATORY (INHALATION) EVERY 4 HOURS PRN
Qty: 1 EACH | Refills: 6 | Status: SHIPPED | OUTPATIENT
Start: 2023-01-18

## 2023-01-18 ASSESSMENT — ANXIETY QUESTIONNAIRES
IF YOU CHECKED OFF ANY PROBLEMS ON THIS QUESTIONNAIRE, HOW DIFFICULT HAVE THESE PROBLEMS MADE IT FOR YOU TO DO YOUR WORK, TAKE CARE OF THINGS AT HOME, OR GET ALONG WITH OTHER PEOPLE: NOT DIFFICULT AT ALL
7. FEELING AFRAID AS IF SOMETHING AWFUL MIGHT HAPPEN: SEVERAL DAYS
3. WORRYING TOO MUCH ABOUT DIFFERENT THINGS: SEVERAL DAYS
2. NOT BEING ABLE TO STOP OR CONTROL WORRYING: SEVERAL DAYS
GAD7 TOTAL SCORE: 7
5. BEING SO RESTLESS THAT IT IS HARD TO SIT STILL: SEVERAL DAYS
6. BECOMING EASILY ANNOYED OR IRRITABLE: SEVERAL DAYS
1. FEELING NERVOUS, ANXIOUS, OR ON EDGE: SEVERAL DAYS
4. TROUBLE RELAXING: SEVERAL DAYS

## 2023-01-18 ASSESSMENT — ENCOUNTER SYMPTOMS
CHILLS: 0
CHILLS: 0
SORE THROAT: 0
EYE PAIN: 0
SINUS PAIN: 1
SHORTNESS OF BREATH: 0
VOMITING: 0
FEVER: 0
ABDOMINAL PAIN: 0
SPUTUM PRODUCTION: 1
PALPITATIONS: 0
PALPITATIONS: 0
BLURRED VISION: 0
HEADACHES: 1
WHEEZING: 1
WHEEZING: 1
EYE PAIN: 0
SHORTNESS OF BREATH: 1
DIARRHEA: 0
MYALGIAS: 1
NAUSEA: 0
DIZZINESS: 0
SINUS PAIN: 0
COUGH: 1
FEVER: 0

## 2023-01-18 ASSESSMENT — PATIENT HEALTH QUESTIONNAIRE - PHQ9: CLINICAL INTERPRETATION OF PHQ2 SCORE: 0

## 2023-01-18 NOTE — PROGRESS NOTES
Subjective:     CC: Here to establish    HPI:   Mike presents today with    Problem   Seasonal Allergic Rhinitis   Acute Non-Recurrent Maxillary Sinusitis    Hx of sinusitis as a child    Current episode started 3 weeks ago. Left sided maxillary pain, completed course of augmentin x 10days, followed up at urgent care and was prescribe course of doxycycline 100mg BID x 5/7days.     Report symptoms improving     IRMA (generalized anxiety disorder)    This is a chronic condition diagnosed at age 16 yo.       IRMA-7: 7  PHQ: 0  Associated symptoms:  Denies psychotic features, Denies personal history of PTSD or manic episodes, Denies significant family history of bipolar disorder  Suicidal ideation/homicidal ideation: Denies  Therapy/counseling:  - not currently   Medications tried:  - sertraline 100mg daily with good benefit  - bupropion- increased anxiety      Depressive Disorder    Hx of depressed mood, report more anxiety than depression.   Denies prior attempt     Mild Intermittent Asthma Without Complication    This is a chronic condition, diagnosed around 8 years old. Previously on advair, montelukast, and singulair. His asthma improved at age 15 and he was taken off medication.   - He had his first flare 1/13/2023 and was seen at urgent care, given medro dosepak and doxycycline 100mg bid for sinusitis    Problems with exercise induced coughing, wheezing, or shortness of breath?  No  Has sleep been disturbed due to symptoms: No    Current medications:    Pneumococcal vaccine  Influenza Vaccine    1/18/2023- currenlty using albuterol BID PRN         Health Maintenance: flu shot today    ROS:  Review of Systems   Constitutional:  Negative for chills and fever.   HENT:  Negative for sinus pain (improved).    Eyes:  Negative for pain.   Respiratory:  Positive for wheezing. Negative for shortness of breath.    Cardiovascular:  Negative for chest pain and palpitations.     Objective:     Exam:  /74 (BP Location:  "Left arm, Patient Position: Sitting, BP Cuff Size: Adult)   Pulse 74   Temp 36.9 °C (98.5 °F) (Temporal)   Ht 1.676 m (5' 6\")   Wt 94.7 kg (208 lb 12.8 oz)   SpO2 97%   BMI 33.70 kg/m²  Body mass index is 33.7 kg/m².    Physical Exam  Constitutional:       Appearance: Normal appearance.   HENT:      Head: Normocephalic and atraumatic.      Comments: No tenderness to palpation of sinuses  Cardiovascular:      Rate and Rhythm: Normal rate and regular rhythm.   Pulmonary:      Effort: Pulmonary effort is normal.      Breath sounds: Normal breath sounds.   Neurological:      Mental Status: He is alert.           Labs: lipid 2020 ldl 107    Assessment & Plan:     22 y.o. male with the following -     1. Mild intermittent asthma without complication  Chronic stable  History of allergies and childhood asthma which improve in his teenage years.  With recent exacerbation after URI/sinusitis.  He completed a course of Augmentin and doxycycline.  He is currently using albuterol as needed.  He was prescribed a Medrol Dosepak.  - On examination there is no significant evidence of expiratory wheezing  Plan  - Recommend preventative vaccinations  - We will prescribe albuterol to pharmacy  - Pneumococal Polysaccharide Vaccine 23-Valent =>1YO SQ/IM  - albuterol 108 (90 Base) MCG/ACT Aero Soln inhalation aerosol; Inhale 2 Puffs every four hours as needed for Shortness of Breath.  Dispense: 1 Each; Refill: 6    2. Acute non-recurrent maxillary sinusitis  Acute, improving  Patient reported history of sinusitis as a child.  She reports it was seen remission until recent episode.  He is completed a course of Augmentin x10 days and currently is on day 5 of 7 of doxycycline tolerating without issue.  Overall he feels like his sinus drainage and pain has improved  Plan  Complete doxycycline course  Continue to monitor  Recommend Flonase  Patient will follow-up as needed May consider ENT referral if he this becomes a chronic " issue    3. Obesity (BMI 30-39.9)  Chronic stable  Multifactorial positive possibly due to body composition as patient regularly does weight training  - Patient identified as having weight management issue.  Appropriate orders and counseling given.    4. Seasonal allergic rhinitis, unspecified trigger  Report history of    5. IRMA (generalized anxiety disorder)  Chronic, stable  On sertraline 100 mg daily without adverse effect  Follow-up with a psychiatrist  Not currently following with a psychologist  Plan  Continue current medical therapy    6. Depressive disorder  Chronic stable  On sertraline 100 mg without adverse effect  Followed with psychiatrist  Plan  Continue current medical therapy    7. Need for vaccination    - INFLUENZA VACCINE QUAD INJ (PF)  - Pneumococal Polysaccharide Vaccine 23-Valent =>3YO SQ/IM      Return if symptoms worsen or fail to improve.    Please note that this dictation was created using voice recognition software. I have made every reasonable attempt to correct obvious errors, but I expect that there are errors of grammar and possibly content that I did not discover before finalizing the note.

## 2023-02-28 NOTE — BH THERAPY
[facility] Progress Note    Patient Name: Mike Miller  Patient MRN: [id number]  Today's Date: 2/19/2020    Type of session:Individual psychotherapy  Length of session: 38 minutes  Persons in attendance:Patient    Subjective/New Info: The patient ID/Chief Complaint:  The patient is a 19 year old male, single, .  The patient was referred by psychiatry and voluntarily presented for an individual intake to address anxiety symptoms including chronic worrying and mild to moderate social anxiety with some evidence of vocal tic related to anxiety.  Reviewed limits of confidentiality and [facility] policies; patient expressed understanding and agreed to voluntarily proceed with evaluation and treatment.     Interval History:   Session Focus: The patient's estimated global assessment of functioning suggests a moderate degree of emotional distress and difficulty dealing with relationships, work and/or school life.  The patient reported that he got a new job working in [employer] and that he started off the interview talking about his physical disability with his hand and how it would not negatively impact his performance.  The patient was very surprised that that is how he started off with the conversation but did indicate growing comfort about his physical disability which has reduced negative automatic thoughts.    Therapeutic Intervention: Cognitive Behavioral Therapy      Planned Intervention: examining automatic thought and using thought records    Objective/Observations:      Patient did not present in acute distress. Patient was appropriately groomed. Patient was alert and oriented x4. Eye contact was appropriate. No abnormalities in attention or concentration were noted. No abnormalities of movement present; psychomotor activity was normal. Speech was fluent and regular in rhythm, rate, volume, and tone. Thought processes linear, logical and goal directed. There was  no evidence of thought disorder. No auditory or visual hallucinations. Long and short term memory appeared to be intact. Insight, judgment, and impulse control were deemed to be good.  Reported mood was “euthymic.” Affect was full-ranging and appropriate to thought content and conversation.  Patient denied past and current suicidal and homicidal ideation in plan, intent, and preparatory behavior.         Diagnoses:   1. IRMA (generalized anxiety disorder)    2. Social anxiety disorder       The patient denied any suicide-related ideation and/or behaviors and intent/plan, denied thoughts about death and dying both in session and during the period since last appointment, or past 2 weeks.    Risk Level: Not Currently at Clinically Significant Risk  Hospitalization is not deemed necessary at this time as the patient does not present a clear or imminent danger to self or others. No indication for pursuing higher level of care. Outpatient management is currently most appropriate and least restrictive level of care.  Current risk:   SUICIDE: Low   Homicide: Not applicable   Self-harm: Not applicable   Relapse: Not applicable   Other:    Safety Plan reviewed? Not Indicated   If evidence of imminent risk is present, intervention/plan:         Treatment Goal(s)/Objective(s) addressed:    Anxiety   Goal: Develop strategies to reduce symptoms, or   Reduce anxiety and improve coping skills   Report feeling more positive about self and abilities during therapy sessions   Develop strategies for thought distraction when fixating on the future     Progress toward Treatment Goals: Mild improvement    Plan:    1) The patient will return to the clinic 1-2 weeks.  2) Crisis Response Plan:  Reviewed emergency resources with the patient and the patient expressed understanding including:  If feeling suicidal, patient will call or present to the [facility] during duty hours or present to closest ED ([facility]  [place] or [facility], call 911 or crisis hotline ([phone]-KTGL) after duty hours.  3) Referrals/Consults:  N/A  4) Barriers to Learning:  No  5) Readiness to Learn:  Yes  6) Cultural Concerns:  No  7) Patient voiced understanding of, and agreement with, plan and goals as annotated above.  8) Declare these services are medically necessary and appropriate to the patient’s diagnosis and needs  9) The point of contact at the [facility] regarding this evaluation is Dr. Chavez, Psychologist.      Jordin Chavez III, Hilda.                                      Detail Level: Detailed

## 2023-04-11 ENCOUNTER — APPOINTMENT (OUTPATIENT)
Dept: BEHAVIORAL HEALTH | Facility: CLINIC | Age: 23
End: 2023-04-11
Payer: COMMERCIAL

## 2023-06-20 ENCOUNTER — TELEMEDICINE (OUTPATIENT)
Dept: BEHAVIORAL HEALTH | Facility: CLINIC | Age: 23
End: 2023-06-20
Payer: COMMERCIAL

## 2023-06-20 DIAGNOSIS — F40.10 SOCIAL PHOBIA: ICD-10-CM

## 2023-06-20 DIAGNOSIS — F41.1 GENERALIZED ANXIETY DISORDER: ICD-10-CM

## 2023-06-20 DIAGNOSIS — R41.840 ATTENTION AND CONCENTRATION DEFICIT: ICD-10-CM

## 2023-06-20 DIAGNOSIS — F32.A DEPRESSIVE DISORDER: ICD-10-CM

## 2023-06-20 PROCEDURE — 99214 OFFICE O/P EST MOD 30 MIN: CPT | Mod: 95 | Performed by: PSYCHIATRY & NEUROLOGY

## 2023-06-20 RX ORDER — SERTRALINE HYDROCHLORIDE 100 MG/1
150 TABLET, FILM COATED ORAL DAILY
Qty: 135 TABLET | Refills: 0 | Status: SHIPPED | OUTPATIENT
Start: 2023-06-20 | End: 2023-09-19 | Stop reason: SDUPTHER

## 2023-06-20 NOTE — PROGRESS NOTES
PSYCHIATRY VIRTUAL VISIT FOLLOW-UP NOTE    Chief Complaint   Patient presents with    Follow-Up     Depression, anxiety     This evaluation was conducted via [employer] using secure and encrypted videoconferencing technology.   The patient was in their home in the [facility].    The patient's identity was confirmed and verbal consent was obtained for this virtual visit.    History Of Present Illness:  Mike Miller is a 23 y.o. male with depressive disorder, generalized anxiety disorder, social anxiety disorder, sleep apnea on CPAP comes in today for follow up, was last seen over 8 months ago.  He has been doing good in regards to both anxiety and depression.  He is compliant with Zoloft and is endorsing benefit.  However, he has been noticing struggles with his focus and motivation.  He has had difficulties with staying focused on the task at hand and getting bored easily with staff.  He has been doing all right in regards to motivation at work but in his personal life he seems to be struggling.  He is staying physically active and denies any struggles with insomnia.  He denies having thoughts of wanting to hurt himself.    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    Substance Use:  Alcohol - Drinks in social settings on the weekends   Nicotine - Denies  Cannabis - Denies  Illicit drugs - Denies     Past Medication Trials:  Wellbutrin SR (effective)    Medications:  Current Outpatient Medications   Medication Sig Dispense Refill    albuterol 108 (90 Base) MCG/ACT Aero Soln inhalation aerosol Inhale 2 Puffs every four hours as needed for Shortness of Breath. 1 Each 6    methylPREDNISolone (MEDROL DOSEPAK) 4 MG Tablet Therapy Pack Follow schedule on package instructions. 21 Tablet 0    sertraline (ZOLOFT) 100 MG Tab Take 1.5 Tablets by mouth every day. 135 Tablet 1     No current facility-administered medications for this  "visit.     Review Of Systems:    Psychiatric - Negative for depression.  Positive for infrequent anxiety    Physical Examination:  Vital signs: There were no vitals taken for this visit.    Musculoskeletal: No abnormal movements.     Mental Status Evaluation:   General: Young male, dressed in casual attire, good grooming and hygiene, in no apparent distress, calm and cooperative, good eye contact, no psychomotor agitation or retardation   Orientation: Alert and oriented to person, place and time  Recent and remote memory: Grossly intact  Attention span and concentration: Grossly intact  Speech: Spontaneous, normal rate, rhythm and tone  Thought Process: Linear, logical and goal directed  Thought Content: Denies suicidal or homicidal ideations, intent or plan  Perception: No delusions noted  Associations: Intact  Language: Appropriate  Fund of knowledge and vocabulary: Grossly adequate  Mood: \"good\"  Affect: Euthymic, mood congruent  Insight: Good  Judgment: Good    Depression screenin/14/2021     1:01 PM 2022     4:05 PM 2023     3:00 PM   Depression Screen (PHQ-2/PHQ-9)   PHQ-2 Total Score 0 1    PHQ-2 Total Score   0   PHQ-9 Total Score 10 8      Interpretation of PHQ-9 Total Score   Score Severity   1-4 No Depression   5-9 Mild Depression   10-14 Moderate Depression   15-19 Moderately Severe Depression   20-27 Severe Depression    Medical Records/Labs/Diagnostic Tests Reviewed:  [place] PDMP records - no prescribed controlled medications found in the last 2 years       Impression:  1.  Generalized anxiety disorder - stable  2.  Social anxiety disorder - stable  3.  Depressive disorder, unspecified type - stable  4.  Attention and concentration deficit - new problem    Plan:  1.  Continue Zoloft 150 mg daily for anxiety and depression  2.  Referral placed for psychological testing for ADHD diagnosis clarification    Return to clinic in 3 months or sooner if symptoms worsen    The proposed treatment " plan was discussed with the patient who was provided the opportunity to ask questions and make suggestions regarding alternative treatment. Patient verbalized understanding and expressed agreement with the plan.     Leona Emerson M.D.  06/20/23    This note was created using voice recognition software (Dragon). The accuracy of the dictation is limited by the abilities of the software. I have reviewed the note prior to signing, however some errors in grammar and context are still possible. If you have any questions related to this note please do not hesitate to contact our office.

## 2023-09-19 ENCOUNTER — TELEMEDICINE (OUTPATIENT)
Dept: BEHAVIORAL HEALTH | Facility: CLINIC | Age: 23
End: 2023-09-19
Payer: COMMERCIAL

## 2023-09-19 DIAGNOSIS — R41.840 ATTENTION AND CONCENTRATION DEFICIT: ICD-10-CM

## 2023-09-19 DIAGNOSIS — F41.1 GENERALIZED ANXIETY DISORDER: ICD-10-CM

## 2023-09-19 DIAGNOSIS — F40.10 SOCIAL PHOBIA: ICD-10-CM

## 2023-09-19 DIAGNOSIS — F33.1 MDD (MAJOR DEPRESSIVE DISORDER), RECURRENT EPISODE, MODERATE (HCC): ICD-10-CM

## 2023-09-19 PROCEDURE — 99214 OFFICE O/P EST MOD 30 MIN: CPT | Mod: 95 | Performed by: PSYCHIATRY & NEUROLOGY

## 2023-09-19 RX ORDER — BUPROPION HYDROCHLORIDE 150 MG/1
150 TABLET, EXTENDED RELEASE ORAL EVERY MORNING
Qty: 30 TABLET | Refills: 1 | Status: SHIPPED | OUTPATIENT
Start: 2023-09-19 | End: 2023-10-16 | Stop reason: SDUPTHER

## 2023-09-19 RX ORDER — SERTRALINE HYDROCHLORIDE 100 MG/1
150 TABLET, FILM COATED ORAL EVERY MORNING
Qty: 45 TABLET | Refills: 1 | Status: SHIPPED | OUTPATIENT
Start: 2023-09-19 | End: 2023-10-16 | Stop reason: SDUPTHER

## 2023-09-19 NOTE — PROGRESS NOTES
PSYCHIATRY VIRTUAL VISIT FOLLOW-UP NOTE    Chief Complaint   Patient presents with    Depression     Worsening      This evaluation was conducted via [employer] using secure and encrypted videoconferencing technology.   The patient was in their home in the [facility].    The patient's identity was confirmed and verbal consent was obtained for this virtual visit.    History Of Present Illness:  Mike Miller is a 23 y.o. male with depressive disorder, generalized anxiety disorder, social anxiety disorder, sleep apnea on CPAP comes in today for follow up, was last seen 3 months ago.  He has been noticing increase struggles with depression for the last several months but for the last 4 to 6 weeks it has been a steady decline.  He is feeling depressed and irritable and is not finding margi in his day-to-day life.  He has not been going to the gym or hanging out with friends which she usually enjoys.  He is struggling with lack of motivation and energy and is wanting to spend time at home all the time.  He is still struggling with his focus and concentration.  He denies any psychosocial stressors that could be contributing to how he is feeling.  He has been doing good in regards to his anxiety.  He has been compliant with Zoloft and denies missing it for weeks and Adderall.  He denies having thoughts of wanting to hurt himself.    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    Substance Use:  Alcohol - Drinks in social settings on the weekends   Nicotine - Denies  Cannabis - Vapes cannabis once a week  Illicit drugs - Denies     Past Medication Trials:  Wellbutrin SR (effective)    Medications:  Current Outpatient Medications   Medication Sig Dispense Refill    sertraline (ZOLOFT) 100 MG Tab Take 1.5 Tablets by mouth every morning. 45 Tablet 1    buPROPion SR (WELLBUTRIN-SR) 150 MG TABLET SR 12 HR sustained-release tablet  "Take 1 Tablet by mouth every morning. 30 Tablet 1    albuterol 108 (90 Base) MCG/ACT Aero Soln inhalation aerosol Inhale 2 Puffs every four hours as needed for Shortness of Breath. 1 Each 6     No current facility-administered medications for this visit.     Review Of Systems:    Psychiatric - Positive for depression, infrequent anxiety    Physical Examination:  Vital signs: There were no vitals taken for this visit.    Musculoskeletal: No abnormal movements.     Mental Status Evaluation:   General: Young male, dressed in casual attire, good grooming and hygiene, in no apparent distress, calm and cooperative, good eye contact, no psychomotor agitation or retardation   Orientation: Alert and oriented to person, place and time  Recent and remote memory: Grossly intact  Attention span and concentration: Grossly intact  Speech: Spontaneous, normal rate, rhythm and tone  Thought Process: Linear, logical and goal directed  Thought Content: Denies suicidal or homicidal ideations, intent or plan  Perception: No delusions noted  Associations: Intact  Language: Appropriate  Fund of knowledge and vocabulary: Grossly adequate  Mood: \"not good\"  Affect: Dysphoric at times, mood congruent  Insight: Good  Judgment: Good    Depression screenin/14/2021     1:01 PM 2022     4:05 PM 2023     3:00 PM   Depression Screen (PHQ-2/PHQ-9)   PHQ-2 Total Score 0 1    PHQ-2 Total Score   0   PHQ-9 Total Score 10 8      Interpretation of PHQ-9 Total Score   Score Severity   1-4 No Depression   5-9 Mild Depression   10-14 Moderate Depression   15-19 Moderately Severe Depression   20-27 Severe Depression    Medical Records/Labs/Diagnostic Tests Reviewed:  [place] PDMP records - no prescribed controlled medications found in the last 2 years       Impression:  1.  Generalized anxiety disorder - stable  2.  Social anxiety disorder - stable  3.  Major depressive disorder, recurrent, moderate - worsening   4.  Attention and concentration " deficit - stable    Plan:  1.  Start Wellbutrin  mg in the morning for depression and anxiety.  He has previously done better on a combination of Wellbutrin and Zoloft.  He is having struggles with his focus and concentration and Wellbutrin should improve those symptoms as well.  2.  Continue Zoloft 150 mg daily for anxiety and depression  3.  I let him know that his referral for ADHD testing has been authorized by insurance and he can call scheduling to schedule an appointment for testing    Return to clinic in  months or sooner if symptoms worsen    The proposed treatment plan was discussed with the patient who was provided the opportunity to ask questions and make suggestions regarding alternative treatment. Patient verbalized understanding and expressed agreement with the plan.     Leona Emerson M.D.  09/19/23    This note was created using voice recognition software (Dragon). The accuracy of the dictation is limited by the abilities of the software. I have reviewed the note prior to signing, however some errors in grammar and context are still possible. If you have any questions related to this note please do not hesitate to contact our office.

## 2023-09-27 DIAGNOSIS — F40.10 SOCIAL ANXIETY DISORDER: ICD-10-CM

## 2023-09-27 DIAGNOSIS — R41.840 ATTENTION AND CONCENTRATION DEFICIT: ICD-10-CM

## 2023-09-27 DIAGNOSIS — F41.1 GAD (GENERALIZED ANXIETY DISORDER): ICD-10-CM

## 2023-09-27 DIAGNOSIS — F40.10 SOCIAL PHOBIA: ICD-10-CM

## 2023-09-27 DIAGNOSIS — F32.A DEPRESSIVE DISORDER: ICD-10-CM

## 2023-11-03 ENCOUNTER — PATIENT MESSAGE (OUTPATIENT)
Dept: BEHAVIORAL HEALTH | Facility: CLINIC | Age: 23
End: 2023-11-03
Payer: COMMERCIAL

## 2023-11-03 DIAGNOSIS — F40.10 SOCIAL ANXIETY DISORDER: ICD-10-CM

## 2023-11-03 DIAGNOSIS — F33.1 MDD (MAJOR DEPRESSIVE DISORDER), RECURRENT EPISODE, MODERATE (HCC): ICD-10-CM

## 2023-11-03 DIAGNOSIS — F41.1 GAD (GENERALIZED ANXIETY DISORDER): ICD-10-CM

## 2023-11-03 RX ORDER — BUPROPION HYDROCHLORIDE 200 MG/1
200 TABLET, EXTENDED RELEASE ORAL EVERY MORNING
Qty: 30 TABLET | Refills: 0 | Status: SHIPPED | OUTPATIENT
Start: 2023-11-03 | End: 2023-11-14 | Stop reason: SDUPTHER

## 2023-11-14 ENCOUNTER — TELEMEDICINE (OUTPATIENT)
Dept: BEHAVIORAL HEALTH | Facility: CLINIC | Age: 23
End: 2023-11-14
Payer: COMMERCIAL

## 2023-11-14 DIAGNOSIS — F33.1 MDD (MAJOR DEPRESSIVE DISORDER), RECURRENT EPISODE, MODERATE (HCC): ICD-10-CM

## 2023-11-14 DIAGNOSIS — F40.10 SOCIAL PHOBIA: ICD-10-CM

## 2023-11-14 DIAGNOSIS — F41.1 GENERALIZED ANXIETY DISORDER: ICD-10-CM

## 2023-11-14 PROCEDURE — 99214 OFFICE O/P EST MOD 30 MIN: CPT | Mod: 95 | Performed by: PSYCHIATRY & NEUROLOGY

## 2023-11-14 RX ORDER — BUPROPION HYDROCHLORIDE 200 MG/1
200 TABLET, EXTENDED RELEASE ORAL EVERY MORNING
Qty: 90 TABLET | Refills: 0 | Status: SHIPPED | OUTPATIENT
Start: 2023-11-14 | End: 2023-12-06 | Stop reason: SDUPTHER

## 2023-11-14 RX ORDER — SERTRALINE HYDROCHLORIDE 100 MG/1
150 TABLET, FILM COATED ORAL EVERY MORNING
Qty: 135 TABLET | Refills: 0 | Status: SHIPPED | OUTPATIENT
Start: 2023-11-14 | End: 2024-02-08 | Stop reason: SDUPTHER

## 2023-11-14 NOTE — PROGRESS NOTES
PSYCHIATRY VIRTUAL VISIT FOLLOW-UP NOTE    Chief Complaint   Patient presents with    Follow-Up     Depression, anxiety     This evaluation was conducted via [employer] using secure and encrypted videoconferencing technology.   The patient was in their home in the [facility].    The patient's identity was confirmed and verbal consent was obtained for this virtual visit.    History Of Present Illness:  Mike Miller is a 23 y.o. male with major depressive disorder, generalized anxiety disorder, social anxiety disorder, sleep apnea on CPAP comes in today for follow up, was last seen 2 months ago.  He is doing better in regards to his depression.  He was started on Wellbutrin at his last appointment and he initially noticed a difference but started feeling depressed again.  He reached out to me and his dose was increased to 200 mg he was started to feel better again.  He is more motivated and has better energy.  He has started to work out regularly and is keeping up with his personal hygiene.  He is tolerating Wellbutrin well without side effects.  He denies having thoughts of wanting to hurt himself.    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    Substance Use:  Alcohol - Drinks in social settings on the weekends   Nicotine - Denies  Cannabis - Denies any use in over   Illicit drugs - Denies     Past Medication Trials:  Wellbutrin SR (effective)    Medications:  Current Outpatient Medications   Medication Sig Dispense Refill    buPROPion (WELLBUTRIN SR) 200 MG SR tablet Take 1 Tablet by mouth every morning. 90 Tablet 0    sertraline (ZOLOFT) 100 MG Tab Take 1.5 Tablets by mouth every morning. 135 Tablet 0    albuterol 108 (90 Base) MCG/ACT Aero Soln inhalation aerosol Inhale 2 Puffs every four hours as needed for Shortness of Breath. 1 Each 6     No current facility-administered medications for this visit.     Review  "Of Systems:    Psychiatric - Positive for depression, infrequent anxiety    Physical Examination:  Vital signs: There were no vitals taken for this visit.    Musculoskeletal: No abnormal movements.     Mental Status Evaluation:   General: Young male, dressed in casual attire, good grooming and hygiene, in no apparent distress, calm and cooperative, good eye contact, no psychomotor agitation or retardation   Orientation: Alert and oriented to person, place and time  Recent and remote memory: Grossly intact  Attention span and concentration: Grossly intact  Speech: Spontaneous, normal rate, rhythm and tone  Thought Process: Linear, logical and goal directed  Thought Content: Denies suicidal or homicidal ideations, intent or plan  Perception: No delusions noted  Associations: Intact  Language: Appropriate  Fund of knowledge and vocabulary: Grossly adequate  Mood: \"good\"  Affect: Euthymic, mood congruent  Insight: Good  Judgment: Good    Depression screenin/14/2021     1:01 PM 2022     4:05 PM 2023     3:00 PM   Depression Screen (PHQ-2/PHQ-9)   PHQ-2 Total Score 0 1    PHQ-2 Total Score   0   PHQ-9 Total Score 10 8      Interpretation of PHQ-9 Total Score   Score Severity   1-4 No Depression   5-9 Mild Depression   10-14 Moderate Depression   15-19 Moderately Severe Depression   20-27 Severe Depression    Medical Records/Labs/Diagnostic Tests Reviewed:  [place] PDMP records - no prescribed controlled medications found in the last 2 years       Impression:  1.  Generalized anxiety disorder - stable  2.  Social anxiety disorder - stable  3.  Major depressive disorder, recurrent, moderate - improving    4.  Attention and concentration deficit - stable    Plan:  1.  Continue Wellbutrin  mg and Zoloft 150 mg in the morning for depression and anxiety.   2.  He has an appointment for psychological testing for ADHD diagnosis clarification with Dr. Boo, Ph.D in 2024    Return to clinic in 2 months or " sooner if symptoms worsen    The proposed treatment plan was discussed with the patient who was provided the opportunity to ask questions and make suggestions regarding alternative treatment. Patient verbalized understanding and expressed agreement with the plan.     Leona Emerson M.D.  11/14/23    This note was created using voice recognition software (Dragon). The accuracy of the dictation is limited by the abilities of the software. I have reviewed the note prior to signing, however some errors in grammar and context are still possible. If you have any questions related to this note please do not hesitate to contact our office.

## 2023-12-05 ENCOUNTER — PATIENT MESSAGE (OUTPATIENT)
Dept: BEHAVIORAL HEALTH | Facility: CLINIC | Age: 23
End: 2023-12-05
Payer: COMMERCIAL

## 2023-12-05 DIAGNOSIS — F33.1 MDD (MAJOR DEPRESSIVE DISORDER), RECURRENT EPISODE, MODERATE (HCC): ICD-10-CM

## 2023-12-06 RX ORDER — BUPROPION HYDROCHLORIDE 200 MG/1
200 TABLET, EXTENDED RELEASE ORAL EVERY MORNING
Qty: 90 TABLET | Refills: 0 | Status: SHIPPED | OUTPATIENT
Start: 2023-12-06 | End: 2024-02-08 | Stop reason: SDUPTHER

## 2023-12-06 NOTE — PATIENT COMMUNICATION
Pt was not able to pick a full 90 day supply only 30 on 11/03 can you resend      Received request via: Patient    Was the patient seen in the last year in this department? Yes    Does the patient have an active prescription (recently filled or refills available) for medication(s) requested? No    Does the patient have [facility] and need 100 day supply (blood pressure, diabetes and cholesterol meds only)? Medication is not for cholesterol, blood pressure or diabetes

## 2023-12-27 ENCOUNTER — OFFICE VISIT (OUTPATIENT)
Dept: BEHAVIORAL HEALTH | Facility: CLINIC | Age: 23
End: 2023-12-27
Attending: PSYCHIATRY & NEUROLOGY
Payer: COMMERCIAL

## 2023-12-27 DIAGNOSIS — F90.0 ADHD (ATTENTION DEFICIT HYPERACTIVITY DISORDER), INATTENTIVE TYPE: ICD-10-CM

## 2023-12-27 PROCEDURE — 96131 PSYCL TST EVAL PHYS/QHP EA: CPT | Performed by: PSYCHOLOGIST

## 2023-12-27 PROCEDURE — 90791 PSYCH DIAGNOSTIC EVALUATION: CPT | Performed by: PSYCHOLOGIST

## 2023-12-27 PROCEDURE — 96136 PSYCL/NRPSYC TST PHY/QHP 1ST: CPT | Performed by: PSYCHOLOGIST

## 2023-12-27 PROCEDURE — 96130 PSYCL TST EVAL PHYS/QHP 1ST: CPT | Performed by: PSYCHOLOGIST
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
## 2024-01-04 ENCOUNTER — OFFICE VISIT (OUTPATIENT)
Dept: BEHAVIORAL HEALTH | Facility: CLINIC | Age: 24
End: 2024-01-04
Payer: COMMERCIAL

## 2024-01-04 DIAGNOSIS — F33.1 MDD (MAJOR DEPRESSIVE DISORDER), RECURRENT EPISODE, MODERATE (HCC): ICD-10-CM

## 2024-01-04 PROCEDURE — 90791 PSYCH DIAGNOSTIC EVALUATION: CPT | Performed by: MARRIAGE & FAMILY THERAPIST

## 2024-01-04 ASSESSMENT — ANXIETY QUESTIONNAIRES
3. WORRYING TOO MUCH ABOUT DIFFERENT THINGS: NOT AT ALL
GAD7 TOTAL SCORE: 2
4. TROUBLE RELAXING: NOT AT ALL
IF YOU CHECKED OFF ANY PROBLEMS ON THIS QUESTIONNAIRE, HOW DIFFICULT HAVE THESE PROBLEMS MADE IT FOR YOU TO DO YOUR WORK, TAKE CARE OF THINGS AT HOME, OR GET ALONG WITH OTHER PEOPLE: NOT DIFFICULT AT ALL
6. BECOMING EASILY ANNOYED OR IRRITABLE: SEVERAL DAYS
5. BEING SO RESTLESS THAT IT IS HARD TO SIT STILL: NOT AT ALL
7. FEELING AFRAID AS IF SOMETHING AWFUL MIGHT HAPPEN: NOT AT ALL
1. FEELING NERVOUS, ANXIOUS, OR ON EDGE: SEVERAL DAYS
2. NOT BEING ABLE TO STOP OR CONTROL WORRYING: NOT AT ALL

## 2024-01-04 ASSESSMENT — PATIENT HEALTH QUESTIONNAIRE - PHQ9
CLINICAL INTERPRETATION OF PHQ2 SCORE: 4
5. POOR APPETITE OR OVEREATING: 2 - MORE THAN HALF THE DAYS
SUM OF ALL RESPONSES TO PHQ QUESTIONS 1-9: 14

## 2024-01-04 NOTE — PROGRESS NOTES
[facility]   Initial Assessment    Name: Mike Miller  MRN: [id number]  : 2000  Age: 23 y.o.  Date of assessment: 2024  PCP: Bentley Brandt M.D.  Persons in attendance: Patient  Total session time: 25 minutes      CHIEF COMPLAINT AND HISTORY OF PRESENTING PROBLEM:  (as stated by Patient):  Mike Miller is a 23 y.o., White male referred for assessment by No ref. provider found.  Primary presenting issue includes   Chief Complaint   Patient presents with    Initial  Evaluation   . ..IRMA-7 Questionnaire    Feeling nervous, anxious, or on edge: Several days  Not being able to sop or control worrying: Not at all  Worrying too much about different things: Not at all  Trouble relaxing: Not at all  Being so restless that it's hard to sit still: Not at all  Becoming easily annoyed or irritable: Several days  Feeling afraid as if something awful might happen: Not at all  Total: 2    Interpretation of IRMA 7 Total Score   Score Severity :  0-4 No Anxiety   5-9 Mild Anxiety  10-14 Moderate Anxiety  15-21 Severe Anxiety     ..Depression Screening    Little interest or pleasure in doing things?  3 - nearly every day   Feeling down, depressed , or hopeless? 1 - several days   Trouble falling or staying asleep, or sleeping too much?  1 - several days   Feeling tired or having little energy?  2 - more than half the days   Poor appetite or overeating?  2 - more than half the days   Feeling bad about yourself - or that you are a failure or have let yourself or your family down? 3 - nearly every day   Trouble concentrating on things, such as reading the newspaper or watching television? 2 - more than half the days   Moving or speaking so slowly that other people could have noticed.  Or the opposite - being so fidgety or restless that you have been moving around a lot more than usual?  0 - not at all   Thoughts that you would be better off dead, or of hurting yourself?  0 - not at all   Patient  Health Questionnaire Score: 14       If depressive symptoms identified deferred to follow up visit unless specifically addressed in assesment and plan.    Interpretation of PHQ-9 Total Score   Score Severity   1-4 No Depression   5-9 Mild Depression   10-14 Moderate Depression   15-19 Moderately Severe Depression   20-27 Severe Depression       BEHAVIORAL HEALTH TREATMENT HISTORY  Does patient/parent report a history of prior behavioral health treatment for patient? Yes:    Dates Level of Care Facilty/Provider Diagnosis/Problem Medications   3/2021 - Present OP Psychiatry [facility] - Dr Emerson Social Anxiety, MDD, IRMA buPROPion (WELLBUTRIN SR) 200 MG SR tablet     sertraline (ZOLOFT) 100 MG Tab       albuterol 108 (90 Base) MCG/ACT Aero Soln inhalation aerosol        12/2023 [facility] - Maciel Boo, Ph.D      12/2020 - 8/2020 OP Therapy Jordin Chavez III, Ed.D  anxiety symptoms including chronic worrying                                                        History of untreated behavioral health issues identified? No  Does patient/parent report change in appetite or weight loss/gain? No  Does patient/parent report physical pain? No              Indicate if pain is acute or chronic, and location: n/a              Pain scale rating:           FAMILY/SOCIAL HISTORY  Current living situation/household members: Patient lives alone  Does patient/parent report a family history of behavioral health issues, diagnoses, or treatment?   Family History   Problem Relation Age of Onset    Depression Mother         on Wellbutrin    Hypertension Father     Heart Disease Father         s/p CABG    Depression Father     Other Father         Spinal stenosis    Colorectal Cancer Maternal Grandfather     Cancer Maternal Grandfather         colon cancer 50    Heart Disease Maternal Grandfather     Breast Cancer Paternal Grandmother     Heart Disease Paternal Grandfather     Sleep Apnea Neg Hx     Ovarian Cancer Neg  Hx     Tubal Cancer Neg Hx     Peritoneal Cancer Neg Hx           EMPLOYMENT/RESOURCES  Is the patient currently employed? Yes  Does the patient/parent report adequate financial resources? Yes       HISTORY:  Does patient report current or past enlistment? No               [If yes, complete below items]  Does patient report history of exposure to combat? No      SPIRITUAL/CULTURAL/IDENTITY:  What are the patient's/family's spiritual beliefs or practices? No      ABUSE/NEGLECT/TRAUMA SCREENING  Does patient report feeling “unsafe” in his/her home, or afraid of anyone? No  Does patient report any history of physical, sexual, or emotional abuse?  Emotional, dad, mother absent  Is there evidence of neglect by self? Yes  Is there evidence of neglect by a caregiver? No                                                                                                          SAFETY ASSESSMENT - SELF  Does patient acknowledge current or past symptoms of dangerousness to self? No  Recent change in frequency/specificity/intensity of suicidal thoughts or self-harm behavior? No  Current access to firearms, medications, or other identified means of suicide/self-harm? No  If yes, willing to restrict access to means of suicide/self-harm? Yes      Current Suicide Risk: Not applicable  Crisis Safety Plan completed and copy given to patient: No      SAFETY ASSESSMENT - OTHERS  Recent change in frequency/specificity/intensity of thoughts or threats to harm others? No  If Yes:  Current access to firearms/other identified means of harm?   If yes, willing to restrict access to weapons/means of harm?     Current Homicide Risk:  Not applicable  Crisis Safety Plan completed and copy given to patient? No  Based on information provided during the current assessment, is a mandated “duty to warn” being exercised? No      SUBSTANCE USE/ADDICTION HISTORY  Patient denies use of any substance/addictive behaviors Yes    If No:  Is there a  family history of substance use/addiction?  Father addictive personality  Does patient acknowledge or parent/significant other report use of/dependence on substances? No  Last time patient used alcohol: October  Within the past week? No  Last time patient used marijuana: June  Within the past month? No  Any other street drugs ever tried even once? Yes  Any use of prescription medications/pills without a prescription, or for reasons others than originally prescribed?  Yes  Any other addictive behavior reported (gambling, shopping, sex)?  Vastrm Shorts, started watching about 2 years ago. Sometimes he'll go home and nap. Will watch 2-3 hours of shorts per day.      Drug History:  Amphetamine:  Amphetamine frequency: Never used      Cannibis:  Cannabis frequency: Daily      Cocaine:  Cocaine frequency: Never used      Ecstasy:  Ecstasy frequency: Never used      Hallucinogen:  Hallucinogen frequency: Never used      Inhalant:   Inhalant frequency: Never used      Opiate:  Cannabis frequency: Daily      Other:  Other drug frequency: Never used      Sedative:   Sedative frequency: Never used          MENTAL STATUS/OBSERVATIONS              Participation: Active verbal participation, Attentive, and Engaged  Grooming: Casual and Disheveled  Orientation:Alert and Fully Oriented   Behavior: Calm  Eye contact: Good          Mood:Euthymic  Affect:Flexible, Full range, and Congruent with content  Thought process: Logical and Goal-directed  Thought content:  Within normal limits  Speech: Rate within normal limits and Volume within normal limits  Perception: Within normal limits  Memory: No gross evidence of memory deficits  Insight: Adequate  Judgment:  Adequate  Other:               Family/couple interaction observations: n/a      Patient's motivation/readiness for change: Learn skills and gain motivation    Topics addressed in psychotherapy include:   HOMEWORK assigned:  [employer]    Kyle Gaspar M.D.  The [employer]  Keeps the Score    Make your bed    This is Water    [employer] [facility]    Care plan completed: No  Does patient express agreement with the above plan? Yes     Diagnosis:  1. MDD (major depressive disorder), recurrent episode, moderate (HCC)        Referral appointment(s) scheduled? YOJANA Awan.

## 2024-01-09 ENCOUNTER — TELEMEDICINE (OUTPATIENT)
Dept: BEHAVIORAL HEALTH | Facility: CLINIC | Age: 24
End: 2024-01-09
Payer: COMMERCIAL

## 2024-01-09 DIAGNOSIS — F40.10 SOCIAL PHOBIA: ICD-10-CM

## 2024-01-09 DIAGNOSIS — F90.0 ADHD (ATTENTION DEFICIT HYPERACTIVITY DISORDER), INATTENTIVE TYPE: ICD-10-CM

## 2024-01-09 DIAGNOSIS — F41.1 GENERALIZED ANXIETY DISORDER: ICD-10-CM

## 2024-01-09 DIAGNOSIS — F33.0 MDD (MAJOR DEPRESSIVE DISORDER), RECURRENT EPISODE, MILD (HCC): ICD-10-CM

## 2024-01-09 PROBLEM — R41.840 ATTENTION AND CONCENTRATION DEFICIT: Status: RESOLVED | Noted: 2023-06-20 | Resolved: 2024-01-09

## 2024-01-09 PROCEDURE — 99214 OFFICE O/P EST MOD 30 MIN: CPT | Mod: 95 | Performed by: PSYCHIATRY & NEUROLOGY

## 2024-01-09 RX ORDER — DEXTROAMPHETAMINE SACCHARATE, AMPHETAMINE ASPARTATE MONOHYDRATE, DEXTROAMPHETAMINE SULFATE AND AMPHETAMINE SULFATE 2.5; 2.5; 2.5; 2.5 MG/1; MG/1; MG/1; MG/1
10 CAPSULE, EXTENDED RELEASE ORAL EVERY MORNING
Qty: 30 CAPSULE | Refills: 0 | Status: SHIPPED | OUTPATIENT
Start: 2024-01-09 | End: 2024-02-08

## 2024-01-09 ASSESSMENT — PATIENT HEALTH QUESTIONNAIRE - PHQ9
CLINICAL INTERPRETATION OF PHQ2 SCORE: 0
5. POOR APPETITE OR OVEREATING: 0 - NOT AT ALL

## 2024-01-09 NOTE — PROGRESS NOTES
PSYCHIATRY VIRTUAL VISIT FOLLOW-UP NOTE    Chief Complaint   Patient presents with    Other     Discuss medications for ADHD     This evaluation was conducted via Zoom using secure and encrypted videoconferencing technology.   The [facility]    The patient's identity was confirmed and verbal consent was obtained for this virtual visit.    History Of Present Illness:  Mike Miller is a 23 y.o. male with major depressive disorder, generalized anxiety disorder, social anxiety disorder, sleep apnea on CPAP comes in today for follow up, was last seen about 2 months ago.  He recently had psychological testing and results were consistent with ADHD and he is interested in a trial of medications.  He recalls that ever since he was a child sustained focus and concentration has been an issue.  He had difficulty staying focused in class and lost his homework all the time.  He mentions that when he was 11 his mother wanted him to get tested for ADHD but it never happened as his father disagreed.  He is now working as a  and has noticed that he will make silly mistakes all the time.  He constantly loses things both at work and in his personal life.  He is always had difficulty staying organized.  He will take longer to finish a task because he is not able to stay focused on the task at hand.  He mentioned that he has been doing good in regards to both anxiety and depression.  He is compliant with Wellbutrin and Zoloft and is endorsing benefit.  He denies having thoughts of wanting to hurt himself.    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    Substance Use:  Alcohol - Drinks in social settings  Nicotine - Denies  Cannabis - Denies  Illicit drugs - Denies     Past Medication Trials:  None    Medications:  Current Outpatient Medications   Medication Sig Dispense Refill     "amphetamine-dextroamphetamine XR (ADDERALL XR) 10 MG CAPSULE SR 24 HR Take 1 Capsule by mouth every morning for 30 days. 30 Capsule 0    buPROPion (WELLBUTRIN SR) 200 MG SR tablet Take 1 Tablet by mouth every morning. 90 Tablet 0    sertraline (ZOLOFT) 100 MG Tab Take 1.5 Tablets by mouth every morning. 135 Tablet 0    albuterol 108 (90 Base) MCG/ACT Aero Soln inhalation aerosol Inhale 2 Puffs every four hours as needed for Shortness of Breath. 1 Each 6     No current facility-administered medications for this visit.     Review Of Systems:    Psychiatric - Positive for depression, infrequent anxiety    Physical Examination:  Vital signs: There were no vitals taken for this visit.    Musculoskeletal: No abnormal movements.     Mental Status Evaluation:   General: Young male, dressed in casual attire, good grooming and hygiene, in no apparent distress, calm and cooperative, good eye contact, no psychomotor agitation or retardation   Orientation: Alert and oriented to person, place and time  Recent and remote memory: Grossly intact  Attention span and concentration: Grossly intact  Speech: Spontaneous, normal rate, rhythm and tone  Thought Process: Linear, logical and goal directed  Thought Content: Denies suicidal or homicidal ideations, intent or plan  Perception: No delusions noted  Associations: Intact  Language: Appropriate  Fund of knowledge and vocabulary: Grossly adequate  Mood: \"good\"  Affect: Euthymic, mood congruent  Insight: Good  Judgment: Good    Depression screenin/18/2023     3:00 PM 2024    10:00 AM 2024     1:00 PM   Depression Screen (PHQ-2/PHQ-9)   PHQ-2 Total Score 0 4 0   PHQ-9 Total Score  14      Interpretation of PHQ-9 Total Score   Score Severity   1-4 No Depression   5-9 Mild Depression   10-14 Moderate Depression   15-19 Moderately Severe Depression   20-27 Severe Depression    Medical Records/Labs/Diagnostic Tests Reviewed:  [place] PDMP records - no prescribed controlled " medications found in the last 2 years   Psychological testing with Dr. Maciel Boo, Ph.D (12/27/23) -ADHD, inattentive type, moderate to severe      Impression:  1.  Generalized anxiety disorder - stable  2.  Social anxiety disorder - stable  3.  Major depressive disorder, recurrent, mild - improving    4.  ADHD, inattentive type - new problem    Plan:  1.  Start Adderall XR 10 mg in the morning for ADHD.  E-prescribed x 4 weeks.  I let him know about the ongoing nationwide stimulant medication shortage.  Discussed controlled medication policy which he will sign at next in person appointment.  Discussed risk of abuse, physical and psychological dependence and tolerance potential with medications like Adderall.  Discussed common side effects including anxiety, agitation/irritability, decreased appetite, weight loss, insomnia, palpitations, tachycardia, hypertension, dizziness etc.  He does have motor tics and I have asked him to monitor for worsening tics once he starts taking Adderall.  2.  Continue with Wellbutrin  mg and Zoloft 150 mg in the morning for depression and anxiety  3.  Continue individual psychotherapy with ZEB Serrato    Return to clinic in 4 weeks or sooner if symptoms worsen    The proposed treatment plan was discussed with the patient who was provided the opportunity to ask questions and make suggestions regarding alternative treatment. Patient verbalized understanding and expressed agreement with the plan.     Leona Emerson M.D.  01/09/24    This note was created using voice recognition software (Dragon). The accuracy of the dictation is limited by the abilities of the software. I have reviewed the note prior to signing, however some errors in grammar and context are still possible. If you have any questions related to this note please do not hesitate to contact our office.

## 2024-02-08 ENCOUNTER — DOCUMENTATION (OUTPATIENT)
Dept: BEHAVIORAL HEALTH | Facility: MEDICAL CENTER | Age: 24
End: 2024-02-08
Payer: COMMERCIAL

## 2024-02-08 ENCOUNTER — PATIENT MESSAGE (OUTPATIENT)
Dept: BEHAVIORAL HEALTH | Facility: CLINIC | Age: 24
End: 2024-02-08
Payer: COMMERCIAL

## 2024-02-08 ENCOUNTER — OFFICE VISIT (OUTPATIENT)
Dept: BEHAVIORAL HEALTH | Facility: CLINIC | Age: 24
End: 2024-02-08
Payer: COMMERCIAL

## 2024-02-08 VITALS — WEIGHT: 195 LBS | BODY MASS INDEX: 31.34 KG/M2 | HEIGHT: 66 IN

## 2024-02-08 DIAGNOSIS — F41.1 GENERALIZED ANXIETY DISORDER: ICD-10-CM

## 2024-02-08 DIAGNOSIS — F90.0 ADHD (ATTENTION DEFICIT HYPERACTIVITY DISORDER), INATTENTIVE TYPE: ICD-10-CM

## 2024-02-08 DIAGNOSIS — F33.41 MDD (MAJOR DEPRESSIVE DISORDER), RECURRENT, IN PARTIAL REMISSION (HCC): ICD-10-CM

## 2024-02-08 DIAGNOSIS — F40.10 SOCIAL PHOBIA: ICD-10-CM

## 2024-02-08 PROCEDURE — 99214 OFFICE O/P EST MOD 30 MIN: CPT | Performed by: PSYCHIATRY & NEUROLOGY

## 2024-02-08 RX ORDER — DEXTROAMPHETAMINE SACCHARATE, AMPHETAMINE ASPARTATE MONOHYDRATE, DEXTROAMPHETAMINE SULFATE AND AMPHETAMINE SULFATE 5; 5; 5; 5 MG/1; MG/1; MG/1; MG/1
20 CAPSULE, EXTENDED RELEASE ORAL EVERY MORNING
Qty: 30 CAPSULE | Refills: 0 | Status: SHIPPED | OUTPATIENT
Start: 2024-02-08 | End: 2024-03-09

## 2024-02-08 RX ORDER — DEXTROAMPHETAMINE SACCHARATE, AMPHETAMINE ASPARTATE MONOHYDRATE, DEXTROAMPHETAMINE SULFATE AND AMPHETAMINE SULFATE 5; 5; 5; 5 MG/1; MG/1; MG/1; MG/1
20 CAPSULE, EXTENDED RELEASE ORAL EVERY MORNING
Qty: 30 CAPSULE | Refills: 0 | Status: SHIPPED | OUTPATIENT
Start: 2024-03-08 | End: 2024-04-07

## 2024-02-08 RX ORDER — BUPROPION HYDROCHLORIDE 200 MG/1
200 TABLET, EXTENDED RELEASE ORAL EVERY MORNING
Qty: 90 TABLET | Refills: 0 | Status: SHIPPED | OUTPATIENT
Start: 2024-02-08 | End: 2024-03-07 | Stop reason: DRUGHIGH

## 2024-02-08 RX ORDER — DEXTROAMPHETAMINE SACCHARATE, AMPHETAMINE ASPARTATE MONOHYDRATE, DEXTROAMPHETAMINE SULFATE AND AMPHETAMINE SULFATE 5; 5; 5; 5 MG/1; MG/1; MG/1; MG/1
20 CAPSULE, EXTENDED RELEASE ORAL EVERY MORNING
Qty: 30 CAPSULE | Refills: 0 | Status: SHIPPED | OUTPATIENT
Start: 2024-02-08 | End: 2024-02-08 | Stop reason: SDUPTHER

## 2024-02-08 RX ORDER — SERTRALINE HYDROCHLORIDE 100 MG/1
150 TABLET, FILM COATED ORAL EVERY MORNING
Qty: 135 TABLET | Refills: 0 | Status: SHIPPED | OUTPATIENT
Start: 2024-02-08

## 2024-02-08 NOTE — PROGRESS NOTES
PSYCHIATRY FOLLOW-UP NOTE    Chief Complaint   Patient presents with    Follow-Up     ADHD, depression, anxiety     History Of Present Illness:  Mike Millre is a 23 y.o. male with major depressive disorder, generalized anxiety disorder, social anxiety disorder, ADHD, sleep apnea on CPAP comes in today for follow up, was last seen 4 weeks ago.  He has been taking Adderall XR for the last 4 weeks and has noticed improvements in his symptoms.  He has noticed that his thought process is a lot more straight and organized and he is able to think few steps ahead which has never happened before.  He feels that he has actually been able to enjoy work because of feeling more focused and organized.  However, he has noticed that the efficacy will last only for about 5 hours.  He has not noticed any significant side effects from Adderall especially worsening tics, worsening anxiety or insomnia.  He continues to do good in regards to depression and anxiety, does feel that symptoms of both have improved in the last month.  He denies having thoughts of wanting to hurt himself.    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    Substance Use:  Alcohol - Drinks in social settings  Nicotine - Denies  Cannabis - Denies  Illicit drugs - Denies     Past Medication Trials:  None    Psychological testing with Dr. Maciel Boo, Ph.D (12/27/23) - ADHD, inattentive type, moderate to severe    Medications:  Current Outpatient Medications   Medication Sig Dispense Refill    amphetamine-dextroamphetamine (ADDERALL XR) 20 MG per XR capsule Take 1 Capsule by mouth every morning for 30 days. 30 Capsule 0    [START ON 3/8/2024] amphetamine-dextroamphetamine (ADDERALL XR) 20 MG per XR capsule Take 1 Capsule by mouth every morning for 30 days. 30 Capsule 0    sertraline (ZOLOFT) 100 MG Tab Take 1.5 Tablets by mouth every morning. 135 Tablet 0     "buPROPion (WELLBUTRIN SR) 200 MG SR tablet Take 1 Tablet by mouth every morning. 90 Tablet 0    albuterol 108 (90 Base) MCG/ACT Aero Soln inhalation aerosol Inhale 2 Puffs every four hours as needed for Shortness of Breath. 1 Each 6     No current facility-administered medications for this visit.     Review Of Systems:    Psychiatric - Positive for infrequent depression, anxiety, impaired concentration    Physical Examination:  Vital signs: Ht 1.676 m (5' 6\")   Wt 88.5 kg (195 lb)   BMI 31.47 kg/m²     Musculoskeletal: No abnormal movements.     Mental Status Evaluation:   General: Young male, dressed in casual attire, good grooming and hygiene, in no apparent distress, calm and cooperative, good eye contact, no psychomotor agitation or retardation   Orientation: Alert and oriented to person, place and time  Recent and remote memory: Grossly intact  Attention span and concentration: Grossly intact  Speech: Spontaneous, normal rate, rhythm and tone  Thought Process: Linear, logical and goal directed  Thought Content: Denies suicidal or homicidal ideations, intent or plan  Perception: No delusions noted  Associations: Intact  Language: Appropriate  Fund of knowledge and vocabulary: Grossly adequate  Mood: \"good\"  Affect: Euthymic, mood congruent  Insight: Good  Judgment: Good    Depression screenin/18/2023     3:00 PM 2024    10:00 AM 2024     1:00 PM   Depression Screen (PHQ-2/PHQ-9)   PHQ-2 Total Score 0 4 0   PHQ-9 Total Score  14      Interpretation of PHQ-9 Total Score   Score Severity   1-4 No Depression   5-9 Mild Depression   10-14 Moderate Depression   15-19 Moderately Severe Depression   20-27 Severe Depression    Medical Records/Labs/Diagnostic Tests Reviewed:  [place] PDMP records - one Addereall resacptrin  found in the last 2 years       Impression:  1.  Generalized anxiety disorder - stable  2.  Social anxiety disorder - stable  3.  Major depressive disorder, recurrent, in partial " remission - improving     4.  ADHD, inattentive type - improving     Plan:  1.  Increase Adderall to XR 20 mg in the morning for ADHD.  E-prescribed x 2 months.  He reviewed and signed controlled medication policy today.  2.  Continue Wellbutrin  mg and Zoloft 150 mg in the morning for depression and anxiety  3.  Continue individual psychotherapy with ZEB Serrato    Return to clinic in 2 months or sooner if symptoms worsen    The proposed treatment plan was discussed with the patient who was provided the opportunity to ask questions and make suggestions regarding alternative treatment. Patient verbalized understanding and expressed agreement with the plan.     Leona Emerson M.D.  02/08/24    This note was created using voice recognition software (Dragon). The accuracy of the dictation is limited by the abilities of the software. I have reviewed the note prior to signing, however some errors in grammar and context are still possible. If you have any questions related to this note please do not hesitate to contact our office.

## 2024-02-08 NOTE — PATIENT COMMUNICATION
Can you please send Adderall 20 mg to [facility] on [address]?    Received request via: Patient    Was the patient seen in the last year in this department? Yes    Does the patient have an active prescription (recently filled or refills available) for medication(s) requested? No    Pharmacy Name: [facility] [address]    Does the patient have long-term Plus and need 100 day supply (blood pressure, diabetes and cholesterol meds only)? Medication is not for cholesterol, blood pressure or diabetes and Patient does not have SCP

## 2024-02-26 ENCOUNTER — OFFICE VISIT (OUTPATIENT)
Dept: BEHAVIORAL HEALTH | Facility: CLINIC | Age: 24
End: 2024-02-26
Payer: COMMERCIAL

## 2024-02-26 DIAGNOSIS — F33.41 MDD (MAJOR DEPRESSIVE DISORDER), RECURRENT, IN PARTIAL REMISSION (HCC): ICD-10-CM

## 2024-02-26 DIAGNOSIS — F41.1 GENERALIZED ANXIETY DISORDER: ICD-10-CM

## 2024-02-26 PROCEDURE — 90837 PSYTX W PT 60 MINUTES: CPT | Performed by: MARRIAGE & FAMILY THERAPIST

## 2024-02-27 NOTE — PROGRESS NOTES
[facility]   Therapy Progress Note        Name: Mike Miller  MRN: [id number]  : 2000  Age: 23 y.o.  Date of assessment: 2024  PCP: Bentley Brandt M.D.  Persons in attendance: Patient  Total session time: 58 minutes      Topics addressed in psychotherapy include: Met with the patient in office for an individual counseling session.      Content of Therapy:   The patient discussed that the job he has been at four month and ½ close down but he now has a better job.  Patient discussed that new medication has made a positive influence in his work and Home Life.  Patient discussed that he would like to quit his second job due to fatigue.  Patient discussed that he all so has debt that he is trying to pay off.      Therapeutic Intervention:   This session, the therapeutic focus was on supporting the patient's progress and allowing him to process the events in his life.  Worked with patient understand moving toward or away from positive places in his life.  Worked on choice skills.  This dictation has been created using voice recognition software and/or scribes. The accuracy of the dictation is limited by the abilities of the software and the expertise of the scribes. I expect there may be some errors of grammar and possibly content. I made every attempt to manually correct the errors within my dictation. However, errors related to voice recognition software and/or scribes may still exist and should be interpreted within the appropriate context.    Objective Observations:   Participation:Active verbal participation, Attentive, and Engaged   Grooming:Casual   Cognition:Alert and Fully Oriented   Eye Contact:Good   Mood:Euthymic and Anxious   Affect:Flexible, Full range, and Congruent with content   Thought Process:Logical and Goal-directed   Speech:Rate within normal limits and Volume within normal limits    Current Risk:   Suicide: low   Homicide: low   Self-Harm: low   Relapse: low   Safety  Plan Reviewed: not applicable    Care Plan Updated: No    Does patient express agreement with the above plan? Yes     Diagnosis:  1. IRMA (generalized anxiety disorder)    2. MDD (major depressive disorder), recurrent, in partial remission (HCC)        Referral appointment(s) scheduled? No       YOJANA Prajapati.

## 2024-03-12 ENCOUNTER — TELEMEDICINE (OUTPATIENT)
Dept: BEHAVIORAL HEALTH | Facility: CLINIC | Age: 24
End: 2024-03-12
Payer: COMMERCIAL

## 2024-03-12 DIAGNOSIS — F40.10 SOCIAL PHOBIA: ICD-10-CM

## 2024-03-12 DIAGNOSIS — F33.41 MDD (MAJOR DEPRESSIVE DISORDER), RECURRENT, IN PARTIAL REMISSION (HCC): ICD-10-CM

## 2024-03-12 DIAGNOSIS — F90.0 ADHD (ATTENTION DEFICIT HYPERACTIVITY DISORDER), INATTENTIVE TYPE: ICD-10-CM

## 2024-03-12 DIAGNOSIS — F41.1 GENERALIZED ANXIETY DISORDER: ICD-10-CM

## 2024-03-12 PROCEDURE — 99214 OFFICE O/P EST MOD 30 MIN: CPT | Mod: 95 | Performed by: PSYCHIATRY & NEUROLOGY

## 2024-03-12 RX ORDER — DEXTROAMPHETAMINE SACCHARATE, AMPHETAMINE ASPARTATE MONOHYDRATE, DEXTROAMPHETAMINE SULFATE AND AMPHETAMINE SULFATE 5; 5; 5; 5 MG/1; MG/1; MG/1; MG/1
20 CAPSULE, EXTENDED RELEASE ORAL EVERY MORNING
Qty: 30 CAPSULE | Refills: 0 | Status: SHIPPED | OUTPATIENT
Start: 2024-05-06 | End: 2024-06-05

## 2024-03-12 RX ORDER — DEXTROAMPHETAMINE SACCHARATE, AMPHETAMINE ASPARTATE MONOHYDRATE, DEXTROAMPHETAMINE SULFATE AND AMPHETAMINE SULFATE 5; 5; 5; 5 MG/1; MG/1; MG/1; MG/1
20 CAPSULE, EXTENDED RELEASE ORAL EVERY MORNING
Qty: 30 CAPSULE | Refills: 0 | Status: SHIPPED | OUTPATIENT
Start: 2024-06-04 | End: 2024-07-04

## 2024-03-12 RX ORDER — DEXTROAMPHETAMINE SACCHARATE, AMPHETAMINE ASPARTATE MONOHYDRATE, DEXTROAMPHETAMINE SULFATE AND AMPHETAMINE SULFATE 5; 5; 5; 5 MG/1; MG/1; MG/1; MG/1
20 CAPSULE, EXTENDED RELEASE ORAL EVERY MORNING
Qty: 30 CAPSULE | Refills: 0 | Status: SHIPPED | OUTPATIENT
Start: 2024-04-07 | End: 2024-05-07

## 2024-03-12 NOTE — PROGRESS NOTES
PSYCHIATRY VIRTUAL VISIT FOLLOW-UP NOTE    Chief Complaint   Patient presents with    Follow-Up     Depression, anxiety, ADHD     This evaluation was conducted via Zoom using secure and encrypted videoconferencing technology.   The patient was in their home in the [facility].    The patient's identity was confirmed and verbal consent was obtained for this virtual visit.    History Of Present Illness:  Mike Miller is a 23 y.o. male with major depressive disorder, generalized anxiety disorder, social anxiety disorder, ADHD, sleep apnea on CPAP comes in today for follow up, was last seen over 4 weeks ago.  He has been doing good in regards to his mental health.  He has been taking direct higher dose of Adderall and has noticed sustained improvements in his ability to stay focus and concentration.  He mentions a day to day he is not noticing much but 1 day he forgot to take it before work and he did notice that he was misplacing his tools a lot.  He has also noticed that he is more focused on his thoughts and confident since he been taking Adderall.  He reached out to me and his dose of Wellbutrin was decreased to 150 mg as he does notice that higher Wellbutrin dose worsens his tics.  He has not noticed any worsening tics since he has been taking Adderall.  He denies any overwhelming struggles with depression or anxiety.  He is at a new plumbing job and so far it is going well for him.  He will not be having health insurance for the month of April as he is turning 24 and will be no longer in his parents health insurance.  He mentions that his new health insurance will kick in in May.  He denies having thoughts of wanting to hurt himself.    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    Substance Use:  Alcohol - Drinks in social settings  Nicotine - Denies  Cannabis - Denies  Illicit drugs - Denies     Past  Medication Trials:  None    Psychological testing with Dr. Maciel Boo, Ph.D (12/27/23) - ADHD, inattentive type, moderate to severe    Medications:  Current Outpatient Medications   Medication Sig Dispense Refill    [START ON 4/7/2024] amphetamine-dextroamphetamine (ADDERALL XR) 20 MG per XR capsule Take 1 Capsule by mouth every morning for 30 days. 30 Capsule 0    [START ON 5/6/2024] amphetamine-dextroamphetamine (ADDERALL XR) 20 MG per XR capsule Take 1 Capsule by mouth every morning for 30 days. 30 Capsule 0    [START ON 6/4/2024] amphetamine-dextroamphetamine (ADDERALL XR) 20 MG per XR capsule Take 1 Capsule by mouth every morning for 30 days. 30 Capsule 0    buPROPion SR (WELLBUTRIN-SR) 150 MG TABLET SR 12 HR sustained-release tablet Take 1 Tablet by mouth every morning. 90 Tablet 0    amphetamine-dextroamphetamine (ADDERALL XR) 20 MG per XR capsule Take 1 Capsule by mouth every morning for 30 days. 30 Capsule 0    sertraline (ZOLOFT) 100 MG Tab Take 1.5 Tablets by mouth every morning. 135 Tablet 0    albuterol 108 (90 Base) MCG/ACT Aero Soln inhalation aerosol Inhale 2 Puffs every four hours as needed for Shortness of Breath. 1 Each 6     No current facility-administered medications for this visit.     Review Of Systems:    Psychiatric - Positive for infrequent depression, anxiety, impaired concentration    Physical Examination:  Vital signs: There were no vitals taken for this visit.    Musculoskeletal: No abnormal movements.     Mental Status Evaluation:   General: Young male, dressed in casual attire, good grooming and hygiene, in no apparent distress, calm and cooperative, good eye contact, no psychomotor agitation or retardation   Orientation: Alert and oriented to person, place and time  Recent and remote memory: Grossly intact  Attention span and concentration: Grossly intact  Speech: Spontaneous, normal rate, rhythm and tone  Thought Process: Linear, logical and goal directed  Thought Content:  "Denies suicidal or homicidal ideations, intent or plan  Perception: No delusions noted  Associations: Intact  Language: Appropriate  Fund of knowledge and vocabulary: Grossly adequate  Mood: \"good\"  Affect: Euthymic, mood congruent  Insight: Good  Judgment: Good    Depression screenin/18/2023     3:00 PM 2024    10:00 AM 2024     1:00 PM   Depression Screen (PHQ-2/PHQ-9)   PHQ-2 Total Score 0 4 0   PHQ-9 Total Score  14      Interpretation of PHQ-9 Total Score   Score Severity   1-4 No Depression   5-9 Mild Depression   10-14 Moderate Depression   15-19 Moderately Severe Depression   20-27 Severe Depression    Medical Records/Labs/Diagnostic Tests Reviewed:  [place] PDMP records - one Addereall resacptrin  found in the last 2 years       Impression:  1.  Generalized anxiety disorder - stable  2.  Social anxiety disorder - stable  3.  Major depressive disorder, recurrent, in partial remission - stable     4.  ADHD, inattentive type - improving     Plan:  1.  Continue Adderall XR 20 mg in the morning for ADHD.  E-prescribed x 3 months.    2.  Continue Wellbutrin  mg in the morning and Zoloft 100 mg at bedtime for depression and anxiety  3.  Continue individual psychotherapy with ZEB Serrato    Return to clinic in 3-4 months or sooner if symptoms worsen    The proposed treatment plan was discussed with the patient who was provided the opportunity to ask questions and make suggestions regarding alternative treatment. Patient verbalized understanding and expressed agreement with the plan.     Leona Emerson M.D.  24    This note was created using voice recognition software (Dragon). The accuracy of the dictation is limited by the abilities of the software. I have reviewed the note prior to signing, however some errors in grammar and context are still possible. If you have any questions related to this note please do not hesitate to contact our office.   "

## 2024-03-26 ENCOUNTER — APPOINTMENT (OUTPATIENT)
Dept: BEHAVIORAL HEALTH | Facility: CLINIC | Age: 24
End: 2024-03-26
Payer: COMMERCIAL

## 2024-04-19 ENCOUNTER — OFFICE VISIT (OUTPATIENT)
Dept: URGENT CARE | Facility: CLINIC | Age: 24
End: 2024-04-19

## 2024-04-19 VITALS
OXYGEN SATURATION: 97 % | TEMPERATURE: 98.3 F | HEIGHT: 66 IN | DIASTOLIC BLOOD PRESSURE: 80 MMHG | HEART RATE: 92 BPM | WEIGHT: 182.6 LBS | SYSTOLIC BLOOD PRESSURE: 110 MMHG | BODY MASS INDEX: 29.35 KG/M2 | RESPIRATION RATE: 18 BRPM

## 2024-04-19 DIAGNOSIS — S05.01XA ABRASION OF RIGHT CORNEA, INITIAL ENCOUNTER: ICD-10-CM

## 2024-04-19 PROCEDURE — 3079F DIAST BP 80-89 MM HG: CPT

## 2024-04-19 PROCEDURE — 99213 OFFICE O/P EST LOW 20 MIN: CPT

## 2024-04-19 PROCEDURE — 3074F SYST BP LT 130 MM HG: CPT

## 2024-04-19 RX ORDER — ERYTHROMYCIN 5 MG/G
1 OINTMENT OPHTHALMIC 4 TIMES DAILY
Qty: 3.5 G | Refills: 0 | Status: SHIPPED | OUTPATIENT
Start: 2024-04-19 | End: 2024-04-26

## 2024-04-19 ASSESSMENT — ENCOUNTER SYMPTOMS
BLURRED VISION: 0
EYE PAIN: 1
PHOTOPHOBIA: 1
EYE REDNESS: 1
EYE DISCHARGE: 1
DOUBLE VISION: 0

## 2024-04-20 NOTE — PROGRESS NOTES
Subjective:   Mike Miller is a 24 y.o. male who presents for Foreign Body in Eye (X2WEEKS Right eye discomfort/watery/burning sensation/pain/swollen/)      HPI: This is a 24-year-old male who presents today for right eye pain.  The patient reports that he felt like he got something in his right eye a few weeks ago.  He states that today he accidentally poked his right eye with a paper bag.  He reports light sensitivity and moderate pain.  He has not applied anything to his eye.  He denies any contact lens use.      Review of Systems   Eyes:  Positive for photophobia, pain, discharge and redness. Negative for blurred vision and double vision.       Medications:    Current Outpatient Medications on File Prior to Visit   Medication Sig Dispense Refill    amphetamine-dextroamphetamine (ADDERALL XR) 20 MG per XR capsule Take 1 Capsule by mouth every morning for 30 days. 30 Capsule 0    [START ON 5/6/2024] amphetamine-dextroamphetamine (ADDERALL XR) 20 MG per XR capsule Take 1 Capsule by mouth every morning for 30 days. 30 Capsule 0    [START ON 6/4/2024] amphetamine-dextroamphetamine (ADDERALL XR) 20 MG per XR capsule Take 1 Capsule by mouth every morning for 30 days. 30 Capsule 0    buPROPion SR (WELLBUTRIN-SR) 150 MG TABLET SR 12 HR sustained-release tablet Take 1 Tablet by mouth every morning. 90 Tablet 0    sertraline (ZOLOFT) 100 MG Tab Take 1.5 Tablets by mouth every morning. 135 Tablet 0    albuterol 108 (90 Base) MCG/ACT Aero Soln inhalation aerosol Inhale 2 Puffs every four hours as needed for Shortness of Breath. 1 Each 6     No current facility-administered medications on file prior to visit.        Allergies:   Patient has no known allergies.    Problem List:   Patient Active Problem List   Diagnosis    Acne vulgaris    Congenital limb deformity    Mild intermittent asthma without complication    MDD (major depressive disorder), recurrent, in partial remission (HCC)    Obesity (BMI 30-39.9)    IRMA  "(generalized anxiety disorder)    Social anxiety disorder    ABBI on CPAP    Seasonal allergic rhinitis    Acute non-recurrent maxillary sinusitis    ADHD (attention deficit hyperactivity disorder), inattentive type        Surgical History:  Past Surgical History:   Procedure Laterality Date    ORIF, WRIST Left 12/22/2017    Procedure: WRIST ORIF;  Surgeon: Gabriel Smith M.D.;  Location: SURGERY [facility];  Service: Orthopedics    EXCISION RADIAL HEAD Right     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     Objective:     /80   Pulse 92   Temp 36.8 °C (98.3 °F) (Temporal)   Resp 18   Ht 1.676 m (5' 6\")   Wt 82.8 kg (182 lb 9.6 oz)   SpO2 97%   BMI 29.47 kg/m²     Physical Exam  Vitals and nursing note reviewed.   Constitutional:       General: He is not in acute distress.     Appearance: Normal appearance. He is normal weight. He is not ill-appearing, toxic-appearing or diaphoretic.   HENT:      Head: Normocephalic and atraumatic.   Eyes:      General:         Right eye: No foreign body, discharge or hordeolum.      Extraocular Movements: Extraocular movements intact.      Pupils: Pupils are equal, round, and reactive to light.        Comments: +corneal abrasion     Eye procedure note: Topical anesthetic was instilled with good anesthesia using 1 GTT of ophthalmic anesthetic Proparacaine.  Fluorescein stain of right eye was performed.  Corneal abrasion noted. Upper lid was everted and no foreign bodies or lesions were noted.  No Felipe sign.  Normal saline irrigation was performed.  Patient tolerated procedure well without " complaint.    Cardiovascular:      Heart sounds: Normal heart sounds.   Pulmonary:      Effort: Pulmonary effort is normal.   Musculoskeletal:      Cervical back: Normal range of motion.   Skin:     General: Skin is warm and dry.      Capillary Refill: Capillary refill takes less than 2 seconds.   Neurological:      General: No focal deficit present.      Mental Status: He is alert and oriented to person, place, and time. Mental status is at baseline.   Psychiatric:         Mood and Affect: Mood normal.         Behavior: Behavior normal.         Thought Content: Thought content normal.         Judgment: Judgment normal.         Assessment/Plan:     Diagnosis and associated orders:   1. Abrasion of right cornea, initial encounter  erythromycin 5 MG/GM Ointment             Comments/MDM:   Pt is clinically stable at today's acute urgent care visit.  No acute distress noted. Appropriate for outpatient management at this time.     Acute problem.  Eye procedure performed and corneal abrasion noted.  Patient will be treated with erythromycin ointment.  Discussed warm compresses for comfort, and taking Tylenol and ibuprofen as needed for pain. Patient is to return to UC or go to ER for any new or worsening signs or symptoms, and follow with with PCP for recheck. Patient is agreeable with plan of care and verbalizes good understanding.             Discussed DDx, management options (risks,benefits, and alternatives to planned treatment), natural progression and supportive care.  Expressed understanding and the treatment plan was agreed upon. Questions were encouraged and answered   Return to urgent care prn if new or worsening sx or if there is no improvement in condition prn.    Educated in Red flags and indications to immediately call 911 or present to the Emergency Department.   Advised the patient to follow-up with the primary care physician for recheck, reevaluation, and consideration of further management.    I  personally reviewed prior external notes and test results pertinent to today's visit.  I have independently reviewed and interpreted all diagnostics ordered during this urgent care acute visit.     Please note that this dictation was created using voice recognition software. I have made a reasonable attempt to correct obvious errors, but I expect that there are errors of grammar and possibly content that I did not discover before finalizing the note.    This note was electronically signed by CATIA Farnsworth

## 2024-06-01 DIAGNOSIS — F33.41 MDD (MAJOR DEPRESSIVE DISORDER), RECURRENT, IN PARTIAL REMISSION (HCC): ICD-10-CM

## 2024-06-01 DIAGNOSIS — F41.1 GENERALIZED ANXIETY DISORDER: ICD-10-CM

## 2024-06-03 RX ORDER — BUPROPION HYDROCHLORIDE 150 MG/1
150 TABLET, EXTENDED RELEASE ORAL EVERY MORNING
Qty: 30 TABLET | Refills: 0 | Status: SHIPPED | OUTPATIENT
Start: 2024-06-03

## 2024-06-03 NOTE — TELEPHONE ENCOUNTER
Received request via: Pharmacy    Was the patient seen in the last year in this department? Yes    Does the patient have an active prescription (recently filled or refills available) for medication(s) requested? No    Pharmacy Name: [facility]    Does the patient have halfway Plus and need 100 day supply (blood pressure, diabetes and cholesterol meds only)? Medication is not for cholesterol, blood pressure or diabetes and Patient does not have SCP

## 2024-06-06 ENCOUNTER — PATIENT MESSAGE (OUTPATIENT)
Dept: BEHAVIORAL HEALTH | Facility: CLINIC | Age: 24
End: 2024-06-06
Payer: COMMERCIAL

## 2024-06-06 DIAGNOSIS — F90.0 ADHD (ATTENTION DEFICIT HYPERACTIVITY DISORDER), INATTENTIVE TYPE: ICD-10-CM

## 2024-06-07 RX ORDER — DEXTROAMPHETAMINE SACCHARATE, AMPHETAMINE ASPARTATE MONOHYDRATE, DEXTROAMPHETAMINE SULFATE AND AMPHETAMINE SULFATE 2.5; 2.5; 2.5; 2.5 MG/1; MG/1; MG/1; MG/1
20 CAPSULE, EXTENDED RELEASE ORAL EVERY MORNING
Qty: 60 CAPSULE | Refills: 0 | Status: SHIPPED | OUTPATIENT
Start: 2024-06-07 | End: 2024-07-07

## 2024-06-07 RX ORDER — DEXTROAMPHETAMINE SACCHARATE, AMPHETAMINE ASPARTATE MONOHYDRATE, DEXTROAMPHETAMINE SULFATE AND AMPHETAMINE SULFATE 5; 5; 5; 5 MG/1; MG/1; MG/1; MG/1
20 CAPSULE, EXTENDED RELEASE ORAL EVERY MORNING
Qty: 30 CAPSULE | Refills: 0 | Status: CANCELLED
Start: 2024-06-07 | End: 2024-07-07

## 2024-06-21 ENCOUNTER — OFFICE VISIT (OUTPATIENT)
Dept: URGENT CARE | Facility: CLINIC | Age: 24
End: 2024-06-21
Payer: COMMERCIAL

## 2024-06-21 VITALS
OXYGEN SATURATION: 98 % | HEIGHT: 66 IN | DIASTOLIC BLOOD PRESSURE: 80 MMHG | SYSTOLIC BLOOD PRESSURE: 122 MMHG | TEMPERATURE: 97.6 F | RESPIRATION RATE: 14 BRPM | HEART RATE: 80 BPM | WEIGHT: 191 LBS | BODY MASS INDEX: 30.7 KG/M2

## 2024-06-21 DIAGNOSIS — S05.01XA ABRASION OF RIGHT CORNEA, INITIAL ENCOUNTER: ICD-10-CM

## 2024-06-21 PROCEDURE — 3074F SYST BP LT 130 MM HG: CPT | Performed by: REGISTERED NURSE

## 2024-06-21 PROCEDURE — 99213 OFFICE O/P EST LOW 20 MIN: CPT | Performed by: REGISTERED NURSE

## 2024-06-21 PROCEDURE — 3079F DIAST BP 80-89 MM HG: CPT | Performed by: REGISTERED NURSE

## 2024-06-21 RX ORDER — MOXIFLOXACIN 5 MG/ML
1 SOLUTION/ DROPS OPHTHALMIC 3 TIMES DAILY
Qty: 2 ML | Refills: 0 | Status: SHIPPED | OUTPATIENT
Start: 2024-06-21 | End: 2024-06-28

## 2024-06-21 ASSESSMENT — ENCOUNTER SYMPTOMS
CHILLS: 0
ABDOMINAL PAIN: 0
EYE PAIN: 0
EYE REDNESS: 1
EYE DISCHARGE: 1
PHOTOPHOBIA: 0
DOUBLE VISION: 0
FEVER: 0
BLURRED VISION: 0
SHORTNESS OF BREATH: 0
DIZZINESS: 0

## 2024-06-21 ASSESSMENT — VISUAL ACUITY: OU: 1

## 2024-06-21 NOTE — PROGRESS NOTES
"Subjective:   Mike Miller is a 24 y.o. male who presents for Eye Problem (X 1 day/ R eye drainage/ burning sensation )      HPI  Is having some right eye watery drainage, burning, slight photophobia.  Feels like something might of gotten in his eye while he was sleeping. No recent illness. Does not wear contacts or glasses. No treatments tried for this. Did have corneal abrasion 2 months ago.    Review of Systems   Constitutional:  Negative for chills and fever.   Eyes:  Positive for discharge and redness. Negative for blurred vision, double vision, photophobia and pain.   Respiratory:  Negative for shortness of breath.    Cardiovascular:  Negative for chest pain.   Gastrointestinal:  Negative for abdominal pain.   Skin:  Negative for rash.   Neurological:  Negative for dizziness.       Medications, Allergies, and current problem list reviewed today in Epic.     Objective:     /80   Pulse 80   Temp 36.4 °C (97.6 °F)   Resp 14   Ht 1.676 m (5' 6\")   Wt 86.6 kg (191 lb)   SpO2 98%     Physical Exam  Vitals and nursing note reviewed.   Constitutional:       Appearance: Normal appearance. He is not ill-appearing or toxic-appearing.   HENT:      Mouth/Throat:      Mouth: Mucous membranes are moist.   Eyes:      General: Lids are normal. Vision grossly intact.         Right eye: No foreign body or discharge.      Extraocular Movements:      Right eye: Normal extraocular motion and no nystagmus.      Conjunctiva/sclera:      Right eye: Right conjunctiva is injected. No chemosis or hemorrhage.     Pupils: Pupils are equal, round, and reactive to light.        Comments: No hypopyon   Cardiovascular:      Rate and Rhythm: Normal rate and regular rhythm.   Pulmonary:      Effort: Pulmonary effort is normal. No respiratory distress.      Breath sounds: Normal breath sounds.   Musculoskeletal:         General: Normal range of motion.      Cervical back: Normal range of motion.   Skin:     General: Skin is warm " and dry.      Capillary Refill: Capillary refill takes less than 2 seconds.      Findings: No rash.   Neurological:      General: No focal deficit present.      Mental Status: He is alert and oriented to person, place, and time.      Cranial Nerves: No cranial nerve deficit.   Psychiatric:         Mood and Affect: Mood normal.         Assessment/Plan:     I personally reviewed prior external notes and test results pertinent to today's visit as well as additional imaging and testing completed in clinic today. Shared decision-making was utilized with patient for treatment plan.     1. Abrasion of right cornea, initial encounter  moxifloxacin (VIGAMOX) 0.5 % Solution        Unsure exactly what happened but through the night feels like something got in his eye woke up and is having burning, light sensitivity and eye watering consistent with prior corneal abrasion.  Does not wear contacts.  Denies acute vision changes.  No recent illness.  Vitals are reassuring.  On exam he does have a corneal abrasion in the 5:00 region right iris.  Remainder of exam unremarkable.  Will place on antibiotic eyedrops.  Eye precautions given.  Recommend follow-up with eye doctor    Medication discussed included indication for use and the potential benefits and side effects. Education was provided regarding the aforementioned assessments. All of the patient's questions were answered to their satisfaction at the time of discharge. Patient was encouraged to monitor symptoms closely, and we reviewed the signs and symptoms which would warrant concern and mandate seeking a higher level of service through the emergency department. Patient stated agreement and understanding of this plan of care.     Please note that this dictation was created using voice recognition software. I have made every reasonable attempt to correct obvious errors, but I expect that there are errors of grammar and possibly content that I did not discover before finalizing  the note.    This note was electronically signed by ANDIE Craft

## 2024-07-01 DIAGNOSIS — F33.41 MDD (MAJOR DEPRESSIVE DISORDER), RECURRENT, IN PARTIAL REMISSION (HCC): ICD-10-CM

## 2024-07-01 DIAGNOSIS — F41.1 GENERALIZED ANXIETY DISORDER: ICD-10-CM

## 2024-07-02 ENCOUNTER — TELEMEDICINE (OUTPATIENT)
Dept: BEHAVIORAL HEALTH | Facility: CLINIC | Age: 24
End: 2024-07-02
Payer: COMMERCIAL

## 2024-07-02 DIAGNOSIS — F90.0 ADHD (ATTENTION DEFICIT HYPERACTIVITY DISORDER), INATTENTIVE TYPE: ICD-10-CM

## 2024-07-02 DIAGNOSIS — F41.1 GENERALIZED ANXIETY DISORDER: ICD-10-CM

## 2024-07-02 DIAGNOSIS — F40.10 SOCIAL PHOBIA: ICD-10-CM

## 2024-07-02 DIAGNOSIS — F33.41 MDD (MAJOR DEPRESSIVE DISORDER), RECURRENT, IN PARTIAL REMISSION (HCC): ICD-10-CM

## 2024-07-02 PROCEDURE — 99214 OFFICE O/P EST MOD 30 MIN: CPT | Mod: 95 | Performed by: PSYCHIATRY & NEUROLOGY

## 2024-07-02 RX ORDER — DEXTROAMPHETAMINE SACCHARATE, AMPHETAMINE ASPARTATE MONOHYDRATE, DEXTROAMPHETAMINE SULFATE AND AMPHETAMINE SULFATE 5; 5; 5; 5 MG/1; MG/1; MG/1; MG/1
20 CAPSULE, EXTENDED RELEASE ORAL 2 TIMES DAILY
Qty: 60 CAPSULE | Refills: 0 | Status: SHIPPED | OUTPATIENT
Start: 2024-07-06 | End: 2024-07-10

## 2024-07-02 RX ORDER — SERTRALINE HYDROCHLORIDE 100 MG/1
150 TABLET, FILM COATED ORAL
Qty: 135 TABLET | Refills: 0 | Status: SHIPPED | OUTPATIENT
Start: 2024-07-02

## 2024-07-02 RX ORDER — DEXTROAMPHETAMINE SACCHARATE, AMPHETAMINE ASPARTATE MONOHYDRATE, DEXTROAMPHETAMINE SULFATE AND AMPHETAMINE SULFATE 5; 5; 5; 5 MG/1; MG/1; MG/1; MG/1
20 CAPSULE, EXTENDED RELEASE ORAL 2 TIMES DAILY
Qty: 60 CAPSULE | Refills: 0 | Status: SHIPPED | OUTPATIENT
Start: 2024-08-01 | End: 2024-07-10

## 2024-07-02 RX ORDER — BUPROPION HYDROCHLORIDE 150 MG/1
150 TABLET, EXTENDED RELEASE ORAL EVERY MORNING
Qty: 90 TABLET | Refills: 0 | Status: SHIPPED | OUTPATIENT
Start: 2024-07-02

## 2024-07-10 ENCOUNTER — PATIENT MESSAGE (OUTPATIENT)
Dept: BEHAVIORAL HEALTH | Facility: CLINIC | Age: 24
End: 2024-07-10
Payer: COMMERCIAL

## 2024-07-10 DIAGNOSIS — F90.0 ADHD (ATTENTION DEFICIT HYPERACTIVITY DISORDER), INATTENTIVE TYPE: ICD-10-CM

## 2024-07-10 RX ORDER — DEXTROAMPHETAMINE SACCHARATE, AMPHETAMINE ASPARTATE MONOHYDRATE, DEXTROAMPHETAMINE SULFATE AND AMPHETAMINE SULFATE 3.75; 3.75; 3.75; 3.75 MG/1; MG/1; MG/1; MG/1
15 CAPSULE, EXTENDED RELEASE ORAL 2 TIMES DAILY
Qty: 60 CAPSULE | Refills: 0 | Status: SHIPPED | OUTPATIENT
Start: 2024-08-08 | End: 2024-09-07

## 2024-07-10 RX ORDER — DEXTROAMPHETAMINE SACCHARATE, AMPHETAMINE ASPARTATE MONOHYDRATE, DEXTROAMPHETAMINE SULFATE AND AMPHETAMINE SULFATE 3.75; 3.75; 3.75; 3.75 MG/1; MG/1; MG/1; MG/1
15 CAPSULE, EXTENDED RELEASE ORAL 2 TIMES DAILY
Qty: 60 CAPSULE | Refills: 0 | Status: SHIPPED | OUTPATIENT
Start: 2024-07-10 | End: 2024-08-09

## 2024-08-06 NOTE — PROGRESS NOTES
"Subjective:      Mike Miller is a 20 y.o. male who presents with Acne        CC: Patient is here today to discuss treatment for acne and need of vaccinations.    HPI         1. Acne vulgaris  Patient reports he has had problems with acne for a number of years and affects mostly his face currently.  Previous acne had affected his back as well and he has scarring from this.  He states that one time he was on doxycycline twice a day it was helpful.  He is currently been using over-the-counter products which have not helped.  He would like to look into Accutane.    2. Gastroesophageal reflux disease without esophagitis  Patient continues on pantoprazole and states he needs this for his indigestion but has not had black stools, nausea or vomiting.    3. ABBI on CPAP  Patient states he is now on CPAP and it is helped his sleep very much.    4. Need for Tdap vaccination  Immunization record shows his last TD AP in 2009.    5. Need for HPV vaccine  Patient admits he does not remember receiving this although records show he received 1 in the past.  Past Medical History:   Diagnosis Date   • Anxiety    • Asthma in remission    • Back pain    • Cold 12/21/2017    \"I had a head cold about 2 weeks ago\"  denies SOB, productive cough   • Depression    • GERD (gastroesophageal reflux disease)    • Panic disorder    • Pneumonia    • Psychiatric problem 12/2017    depression   • Tic disorder      Social History     Socioeconomic History   • Marital status: Single     Spouse name: Not on file   • Number of children: Not on file   • Years of education: Not on file   • Highest education level: Not on file   Occupational History   • Not on file   Social Needs   • Financial resource strain: Not on file   • Food insecurity     Worry: Not on file     Inability: Not on file   • Transportation needs     Medical: Not on file     Non-medical: Not on file   Tobacco Use   • Smoking status: Former Smoker     Packs/day: 0.00   • Smokeless " Im not sure what she is referring to as high but I do see she had an abnormal stress test with cardiology. Please make sure she was contacted by them with this and a follow up plan in place.    tobacco: Never Used   Substance and Sexual Activity   • Alcohol use: Yes     Alcohol/week: 0.0 oz     Comment: infrequent   • Drug use: Not Currently     Frequency: 7.0 times per week     Types: Marijuana, Oral   • Sexual activity: Not Currently     Partners: Female   Lifestyle   • Physical activity     Days per week: Not on file     Minutes per session: Not on file   • Stress: Not on file   Relationships   • Social connections     Talks on phone: Not on file     Gets together: Not on file     Attends Gnosticism service: Not on file     Active member of club or organization: Not on file     Attends meetings of clubs or organizations: Not on file     Relationship status: Not on file   • Intimate partner violence     Fear of current or ex partner: Not on file     Emotionally abused: Not on file     Physically abused: Not on file     Forced sexual activity: Not on file   Other Topics Concern   • Behavioral problems Not Asked   • Interpersonal relationships Not Asked   • Sad or not enjoying activities Not Asked   • Suicidal thoughts Not Asked   • Poor school performance Not Asked   • Reading difficulties Not Asked   • Speech difficulties Not Asked   • Writing difficulties Not Asked   • Inadequate sleep Not Asked   • Excessive TV viewing Not Asked   • Excessive video game use Not Asked   • Inadequate exercise Not Asked   • Sports related Not Asked   • Poor diet Not Asked   • Family concerns for drug/alcohol abuse Not Asked   • Poor oral hygiene Not Asked   • Bike safety Not Asked   • Family concerns vehicle safety Not Asked   Social History Narrative   • Not on file     Current Outpatient Medications   Medication Sig Dispense Refill   • doxycycline (VIBRAMYCIN) 100 MG Tab Take 1 Tab by mouth 2 times a day. 60 Tab 11   • pantoprazole (PROTONIX) 40 MG Tablet Delayed Response Take 1 Tab by mouth every day. 30 Tab 10   • buPROPion SR (WELLBUTRIN SR) 200 MG SR tablet Take 1 Tab by mouth every morning. 90 Tab 0   • sertraline  "(ZOLOFT) 100 MG Tab Take 1 Tab by mouth every bedtime. 90 Tab 0   • NON SPECIFIED OTC Adrafinil - 300 mg daily     • MELATONIN PO Take  by mouth.     • multivitamin (THERAGRAN) Tab Take 1 Tab by mouth every day.       No current facility-administered medications for this visit.      Family History   Problem Relation Age of Onset   • Hypertension Father    • Heart Disease Father         s/p CABG   • Depression Father    • Other Father         Spinal stenosis   • Depression Mother         on Wellbutrin   • Sleep Apnea Neg Hx          Review of Systems   Skin: Positive for rash.   All other systems reviewed and are negative.         Objective:     /70 (BP Location: Right arm, Patient Position: Sitting, BP Cuff Size: Adult)   Pulse 77   Resp 16   Ht 1.702 m (5' 7\")   Wt 81.2 kg (179 lb)   SpO2 95%   BMI 28.04 kg/m²      Physical Exam  Vitals signs and nursing note reviewed.   Constitutional:       General: He is not in acute distress.     Appearance: He is well-developed. He is not diaphoretic.   HENT:      Head: Normocephalic and atraumatic.      Right Ear: External ear normal.      Left Ear: External ear normal.      Nose: Nose normal.   Eyes:      General:         Right eye: No discharge.         Left eye: No discharge.      Conjunctiva/sclera: Conjunctivae normal.   Neck:      Musculoskeletal: Normal range of motion and neck supple.      Thyroid: No thyromegaly.      Trachea: No tracheal deviation.   Cardiovascular:      Rate and Rhythm: Normal rate and regular rhythm.      Heart sounds: Normal heart sounds. No murmur.   Pulmonary:      Effort: Pulmonary effort is normal. No respiratory distress.      Breath sounds: Normal breath sounds. No wheezing or rales.   Musculoskeletal:      Comments: Bilateral hand deformity   Lymphadenopathy:      Cervical: No cervical adenopathy.   Skin:     General: Skin is warm and dry.      Findings: Rash present. No erythema.      Comments: Cystic acne mostly of the face. "   Neurological:      Mental Status: He is alert and oriented to person, place, and time.      Coordination: Coordination normal.   Psychiatric:         Behavior: Behavior normal.         Thought Content: Thought content normal.         Judgment: Judgment normal.                 Assessment/Plan:       1. Acne vulgaris  I discussed options with patient including over-the-counter products, prescription topical products and doxycycline at either 1 or 2/day.  He would like to get back on twice a day doxycycline since this worked for him in the past.  We discussed the pros and cons of long-term antibiotic therapy.  He also would like to look into Accutane so a referral to dermatology was placed.  - doxycycline (VIBRAMYCIN) 100 MG Tab; Take 1 Tab by mouth 2 times a day.  Dispense: 60 Tab; Refill: 11  - REFERRAL TO DERMATOLOGY    2. Gastroesophageal reflux disease without esophagitis  Patient feels he needs to use this though I explained to him he should try to only use it as needed.  - pantoprazole (PROTONIX) 40 MG Tablet Delayed Response; Take 1 Tab by mouth every day.  Dispense: 30 Tab; Refill: 10    3. ABBI on CPAP  Patient doing well on CPAP and will continue to use this.    4. Need for Tdap vaccination  I have placed the below orders and discussed them with an approved delegating provider. The MA is performing the below orders under the direction of Dr. Stone    - Tdap =>8yo IM    5. Need for HPV vaccine  Patient only needs 1 more HPV which she can receive in a few months.  - 9VHPV Vaccine 2-3 Dose IM

## 2024-08-30 ENCOUNTER — TELEMEDICINE (OUTPATIENT)
Dept: BEHAVIORAL HEALTH | Facility: CLINIC | Age: 24
End: 2024-08-30
Payer: COMMERCIAL

## 2024-08-30 DIAGNOSIS — F40.10 SOCIAL PHOBIA: ICD-10-CM

## 2024-08-30 DIAGNOSIS — F33.41 MDD (MAJOR DEPRESSIVE DISORDER), RECURRENT, IN PARTIAL REMISSION (HCC): ICD-10-CM

## 2024-08-30 DIAGNOSIS — F90.0 ADHD (ATTENTION DEFICIT HYPERACTIVITY DISORDER), INATTENTIVE TYPE: ICD-10-CM

## 2024-08-30 DIAGNOSIS — F41.1 GENERALIZED ANXIETY DISORDER: ICD-10-CM

## 2024-08-30 RX ORDER — DEXTROAMPHETAMINE SACCHARATE, AMPHETAMINE ASPARTATE MONOHYDRATE, DEXTROAMPHETAMINE SULFATE AND AMPHETAMINE SULFATE 3.75; 3.75; 3.75; 3.75 MG/1; MG/1; MG/1; MG/1
15 CAPSULE, EXTENDED RELEASE ORAL 2 TIMES DAILY
Qty: 60 CAPSULE | Refills: 0 | Status: SHIPPED | OUTPATIENT
Start: 2024-09-12 | End: 2024-10-12

## 2024-08-30 RX ORDER — BUPROPION HYDROCHLORIDE 200 MG/1
200 TABLET, EXTENDED RELEASE ORAL EVERY MORNING
Qty: 90 TABLET | Refills: 0 | Status: SHIPPED | OUTPATIENT
Start: 2024-08-30

## 2024-08-30 RX ORDER — DEXTROAMPHETAMINE SACCHARATE, AMPHETAMINE ASPARTATE MONOHYDRATE, DEXTROAMPHETAMINE SULFATE AND AMPHETAMINE SULFATE 3.75; 3.75; 3.75; 3.75 MG/1; MG/1; MG/1; MG/1
15 CAPSULE, EXTENDED RELEASE ORAL 2 TIMES DAILY
Qty: 60 CAPSULE | Refills: 0 | Status: SHIPPED | OUTPATIENT
Start: 2024-10-11 | End: 2024-11-10

## 2024-08-30 RX ORDER — SERTRALINE HYDROCHLORIDE 100 MG/1
100 TABLET, FILM COATED ORAL
Qty: 90 TABLET | Refills: 0 | Status: SHIPPED | OUTPATIENT
Start: 2024-08-30

## 2024-08-30 NOTE — PROGRESS NOTES
PSYCHIATRY VIRTUAL VISIT FOLLOW-UP NOTE    Chief Complaint   Patient presents with    Follow-Up     Depression, anxiety, ADHD     This evaluation was conducted via Teams using secure and encrypted videoconferencing technology.   The patient was in their home in the [facility].    The patient's identity was confirmed and verbal consent was obtained for this virtual visit.    History Of Present Illness:  Mike Millre is a 24 y.o. male with major depressive disorder, generalized anxiety disorder, social anxiety disorder, ADHD, sleep apnea on CPAP comes in today for follow up, was last seen about 2 months ago.  He has been doing better in regards to his ADHD symptoms with Adderall XR twice daily dosing.  He is taking it at 6 in the morning and noon and is noticing that he is able to stay focused for most part of the workday.  He has not noticed any side effects from Adderall including worsening anxiety, insomnia or tics.  He is noticing increase struggles with finding margi in his day-to-day life and lack of motivation.  He has to force himself to go to the gym which she usually enjoys.  He has been taking Zoloft 100 mg for the last several months as he noticed emotional numbness at 150 mg dose.  He briefly took Wellbutrin  mg as he had leftovers from her older prescription and felt that that was a lot more beneficial in terms of giving him extra motivation.  He has not noticed any significant decline in appetite or sleep.  He denies any psychosocial stressors contributing to how he is feeling.  He denies having thoughts of wanting to hurt himself.    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    Substance Use:  Alcohol - Drinks in social settings  Nicotine - Denies  Cannabis - Denies  Illicit drugs - Denies     Past Medication Trials:  None    Psychological testing with Dr. Maciel Boo, Ph.D  "(23) - ADHD, inattentive type, moderate to severe    Medications:  Current Outpatient Medications   Medication Sig Dispense Refill    [START ON 2024] amphetamine-dextroamphetamine (ADDERALL XR) 15 MG XR capsule Take 1 Capsule by mouth 2 times a day for 30 days. 60 Capsule 0    [START ON 10/11/2024] amphetamine-dextroamphetamine (ADDERALL XR) 15 MG XR capsule Take 1 Capsule by mouth 2 times a day for 30 days. 60 Capsule 0    buPROPion SR (WELLBUTRIN SR) 200 MG SR tablet Take 1 Tablet by mouth every morning. 90 Tablet 0    sertraline (ZOLOFT) 100 MG Tab Take 1 Tablet by mouth every day with lunch. 90 Tablet 0    amphetamine-dextroamphetamine (ADDERALL XR) 15 MG XR capsule Take 1 Capsule by mouth 2 times a day for 30 days. 60 Capsule 0    albuterol 108 (90 Base) MCG/ACT Aero Soln inhalation aerosol Inhale 2 Puffs every four hours as needed for Shortness of Breath. 1 Each 6     No current facility-administered medications for this visit.     Review Of Systems:    Psychiatric - Positive for depression, anxiety    Physical Examination:  Vital signs: There were no vitals taken for this visit.    Musculoskeletal: No abnormal movements.     Mental Status Evaluation:   General: Young male, dressed in casual attire, good grooming and hygiene, in no apparent distress, calm and cooperative, good eye contact, no psychomotor agitation or retardation   Orientation: Alert and oriented to person, place and time  Recent and remote memory: Grossly intact  Attention span and concentration: Grossly intact  Speech: Spontaneous, normal rate, rhythm and tone  Thought Process: Linear, logical and goal directed  Thought Content: Denies suicidal or homicidal ideations, intent or plan  Perception: No delusions noted  Associations: Intact  Language: Appropriate  Fund of knowledge and vocabulary: Grossly adequate  Mood: \"good\"  Affect: Euthymic, mood incongruent  Insight: Good  Judgment: Good    Depression screenin/18/2023     " 3:00 PM 1/4/2024    10:00 AM 1/9/2024     1:00 PM   Depression Screen (PHQ-2/PHQ-9)   PHQ-2 Total Score 0 4 0   PHQ-9 Total Score  14      Interpretation of PHQ-9 Total Score   Score Severity   1-4 No Depression   5-9 Mild Depression   10-14 Moderate Depression   15-19 Moderately Severe Depression   20-27 Severe Depression    Medical Records/Labs/Diagnostic Tests Reviewed:  [place] PDMP records - appropriate refills      Impression:  1.  Generalized anxiety disorder - stable  2.  Social anxiety disorder - stable  3.  Major depressive disorder, recurrent, mild - stable     4.  ADHD, inattentive type - stable     Plan:  1.  Continue Adderall XR 15 mg twice daily for ADHD.  E-prescribed x 2 months.  2.  Increase Wellbutrin SR to 200 mg in the morning for depression and anxiety  3.  Continue Zoloft 100 mg in the afternoon for depression and anxiety.  He has been taking 100 mg for the last several months as he noticed emotional numbness at 150 mg dose.  4.  Continue individual psychotherapy with ZEB Serrato    Return to clinic in 2 months or sooner if symptoms worsen    The proposed treatment plan was discussed with the patient who was provided the opportunity to ask questions and make suggestions regarding alternative treatment. Patient verbalized understanding and expressed agreement with the plan.     Leona Emerson M.D.  08/30/24    This note was created using voice recognition software (Dragon). The accuracy of the dictation is limited by the abilities of the software. I have reviewed the note prior to signing, however some errors in grammar and context are still possible. If you have any questions related to this note please do not hesitate to contact our office.

## 2024-09-16 ENCOUNTER — PATIENT MESSAGE (OUTPATIENT)
Dept: BEHAVIORAL HEALTH | Facility: CLINIC | Age: 24
End: 2024-09-16
Payer: COMMERCIAL

## 2024-09-16 DIAGNOSIS — F90.0 ADHD (ATTENTION DEFICIT HYPERACTIVITY DISORDER), INATTENTIVE TYPE: ICD-10-CM

## 2024-09-16 RX ORDER — DEXTROAMPHETAMINE SACCHARATE, AMPHETAMINE ASPARTATE MONOHYDRATE, DEXTROAMPHETAMINE SULFATE AND AMPHETAMINE SULFATE 3.75; 3.75; 3.75; 3.75 MG/1; MG/1; MG/1; MG/1
15 CAPSULE, EXTENDED RELEASE ORAL 2 TIMES DAILY
Qty: 60 CAPSULE | Refills: 0 | Status: SHIPPED | OUTPATIENT
Start: 2024-09-16 | End: 2024-10-16

## 2024-09-16 NOTE — PATIENT COMMUNICATION
Can you resend the adderall to [facility] on [address]?    Received request via: Patient    Was the patient seen in the last year in this department? Yes    Does the patient have an active prescription (recently filled or refills available) for medication(s) requested? No    Pharmacy Name: [facility]    Does the patient have detention Plus and need 100-day supply? (This applies to ALL medications) Patient does not have SCP

## 2024-10-20 ENCOUNTER — OFFICE VISIT (OUTPATIENT)
Dept: URGENT CARE | Facility: CLINIC | Age: 24
End: 2024-10-20
Payer: COMMERCIAL

## 2024-10-20 VITALS
DIASTOLIC BLOOD PRESSURE: 70 MMHG | TEMPERATURE: 98.1 F | BODY MASS INDEX: 30.37 KG/M2 | RESPIRATION RATE: 13 BRPM | SYSTOLIC BLOOD PRESSURE: 120 MMHG | OXYGEN SATURATION: 97 % | HEART RATE: 93 BPM | HEIGHT: 66 IN | WEIGHT: 189 LBS

## 2024-10-20 DIAGNOSIS — B96.89 ACUTE BACTERIAL SINUSITIS: ICD-10-CM

## 2024-10-20 DIAGNOSIS — J01.90 ACUTE BACTERIAL SINUSITIS: ICD-10-CM

## 2024-10-20 PROCEDURE — 3074F SYST BP LT 130 MM HG: CPT | Performed by: STUDENT IN AN ORGANIZED HEALTH CARE EDUCATION/TRAINING PROGRAM

## 2024-10-20 PROCEDURE — 3078F DIAST BP <80 MM HG: CPT | Performed by: STUDENT IN AN ORGANIZED HEALTH CARE EDUCATION/TRAINING PROGRAM

## 2024-10-20 PROCEDURE — 99213 OFFICE O/P EST LOW 20 MIN: CPT | Performed by: STUDENT IN AN ORGANIZED HEALTH CARE EDUCATION/TRAINING PROGRAM

## 2024-10-21 ENCOUNTER — PHARMACY VISIT (OUTPATIENT)
Dept: PHARMACY | Facility: MEDICAL CENTER | Age: 24
End: 2024-10-21
Payer: COMMERCIAL

## 2024-10-21 PROCEDURE — RXMED WILLOW AMBULATORY MEDICATION CHARGE: Performed by: STUDENT IN AN ORGANIZED HEALTH CARE EDUCATION/TRAINING PROGRAM

## 2024-11-05 ENCOUNTER — TELEMEDICINE (OUTPATIENT)
Dept: BEHAVIORAL HEALTH | Facility: CLINIC | Age: 24
End: 2024-11-05
Payer: COMMERCIAL

## 2024-11-05 DIAGNOSIS — F40.10 SOCIAL PHOBIA: ICD-10-CM

## 2024-11-05 DIAGNOSIS — F41.1 GENERALIZED ANXIETY DISORDER: ICD-10-CM

## 2024-11-05 DIAGNOSIS — F33.41 MDD (MAJOR DEPRESSIVE DISORDER), RECURRENT, IN PARTIAL REMISSION (HCC): ICD-10-CM

## 2024-11-05 DIAGNOSIS — F90.0 ADHD (ATTENTION DEFICIT HYPERACTIVITY DISORDER), INATTENTIVE TYPE: ICD-10-CM

## 2024-11-05 PROCEDURE — 99214 OFFICE O/P EST MOD 30 MIN: CPT | Mod: 95 | Performed by: PSYCHIATRY & NEUROLOGY

## 2024-11-05 RX ORDER — DEXTROAMPHETAMINE SACCHARATE, AMPHETAMINE ASPARTATE MONOHYDRATE, DEXTROAMPHETAMINE SULFATE AND AMPHETAMINE SULFATE 3.75; 3.75; 3.75; 3.75 MG/1; MG/1; MG/1; MG/1
15 CAPSULE, EXTENDED RELEASE ORAL 2 TIMES DAILY
Qty: 60 CAPSULE | Refills: 0 | Status: SHIPPED | OUTPATIENT
Start: 2024-12-12 | End: 2025-01-11

## 2024-11-05 RX ORDER — DEXTROAMPHETAMINE SACCHARATE, AMPHETAMINE ASPARTATE MONOHYDRATE, DEXTROAMPHETAMINE SULFATE AND AMPHETAMINE SULFATE 3.75; 3.75; 3.75; 3.75 MG/1; MG/1; MG/1; MG/1
15 CAPSULE, EXTENDED RELEASE ORAL 2 TIMES DAILY
Qty: 60 CAPSULE | Refills: 0 | Status: CANCELLED | OUTPATIENT
Start: 2025-01-10 | End: 2025-02-09

## 2024-11-05 RX ORDER — BUPROPION HYDROCHLORIDE 200 MG/1
200 TABLET, EXTENDED RELEASE ORAL EVERY MORNING
Qty: 30 TABLET | Refills: 1 | Status: SHIPPED | OUTPATIENT
Start: 2024-11-05 | End: 2024-11-08 | Stop reason: SDUPTHER

## 2024-11-05 RX ORDER — SERTRALINE HYDROCHLORIDE 100 MG/1
100 TABLET, FILM COATED ORAL
Qty: 30 TABLET | Refills: 1 | Status: SHIPPED | OUTPATIENT
Start: 2024-11-05

## 2024-11-05 RX ORDER — DEXTROAMPHETAMINE SACCHARATE, AMPHETAMINE ASPARTATE MONOHYDRATE, DEXTROAMPHETAMINE SULFATE AND AMPHETAMINE SULFATE 3.75; 3.75; 3.75; 3.75 MG/1; MG/1; MG/1; MG/1
15 CAPSULE, EXTENDED RELEASE ORAL 2 TIMES DAILY
Qty: 60 CAPSULE | Refills: 0 | Status: SHIPPED | OUTPATIENT
Start: 2024-11-13 | End: 2024-11-14 | Stop reason: SDUPTHER

## 2024-11-05 NOTE — PROGRESS NOTES
PSYCHIATRY VIRTUAL VISIT FOLLOW-UP NOTE    Chief Complaint   Patient presents with    Follow-Up     Depression, anxiety, ADHD     This evaluation was conducted via Teams using secure and encrypted videoconferencing technology.   The patient was in their home in the [facility].    The patient's identity was confirmed and verbal consent was obtained for this virtual visit.    History Of Present Illness:  Mike Miller is a 24 y.o. male with major depressive disorder, generalized anxiety disorder, social anxiety disorder, ADHD, sleep apnea on CPAP comes in today for follow up, was last seen 2 months ago.  He is doing all right in regards to his mental health.  He mentions that he has been paying attention to his medications and is noticing that many.  He is picking up Wellbutrin from a particular pharmaceutical company he feels good but does not feel the same when he picks up a medication from a different .  He did feel better for a while but is still struggling with lack of motivation and lack of drive.  He has not been able to push himself to go to the gym.  He is not feeling sad but is also not feeling happy.  He is doing all right in regards to his anxiety and ADHD symptoms.  He denies any new psychosocial stressors that are contributing to how he is feeling.  He denies having thoughts of wanting to hurt himself.    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    Substance Use:  Alcohol - Drinks in social settings  Nicotine - Denies  Cannabis - Denies  Illicit drugs - Denies     Past Medication Trials:  None    Psychological testing with Dr. Maciel Boo, Ph.D (12/27/23) - ADHD, inattentive type, moderate to severe    Medications:  Current Outpatient Medications   Medication Sig Dispense Refill    [START ON 11/13/2024] amphetamine-dextroamphetamine (ADDERALL XR) 15 MG XR capsule Take 1  "Capsule by mouth 2 times a day for 30 days. 60 Capsule 0    [START ON 2024] amphetamine-dextroamphetamine (ADDERALL XR) 15 MG XR capsule Take 1 Capsule by mouth 2 times a day for 30 days. 60 Capsule 0    buPROPion (WELLBUTRIN SR) 200 MG SR tablet Take 1 Tablet by mouth every morning. 30 Tablet 1    sertraline (ZOLOFT) 100 MG Tab Take 1 Tablet by mouth every day with lunch. 30 Tablet 1    amphetamine-dextroamphetamine (ADDERALL XR) 15 MG XR capsule Take 1 Capsule by mouth 2 times a day for 30 days. 60 Capsule 0    albuterol 108 (90 Base) MCG/ACT Aero Soln inhalation aerosol Inhale 2 Puffs every four hours as needed for Shortness of Breath. 1 Each 6     No current facility-administered medications for this visit.     Review Of Systems:    Psychiatric - Positive for depression, anxiety    Physical Examination:  Vital signs: There were no vitals taken for this visit.    Musculoskeletal: No abnormal movements.     Mental Status Evaluation:   General: Young male, dressed in casual attire, good grooming and hygiene, in no apparent distress, calm and cooperative, good eye contact, no psychomotor agitation or retardation   Orientation: Alert and oriented to person, place and time  Recent and remote memory: Grossly intact  Attention span and concentration: Grossly intact  Speech: Spontaneous, normal rate, rhythm and tone  Thought Process: Linear, logical and goal directed  Thought Content: Denies suicidal or homicidal ideations, intent or plan  Perception: No delusions noted  Associations: Intact  Language: Appropriate  Fund of knowledge and vocabulary: Grossly adequate  Mood: \"good\"  Affect: Euthymic, mood incongruent  Insight: Good  Judgment: Good    Depression screenin/18/2023     3:00 PM 2024    10:00 AM 2024     1:00 PM   Depression Screen (PHQ-2/PHQ-9)   PHQ-2 Total Score 0 4 0   PHQ-9 Total Score  14      Interpretation of PHQ-9 Total Score   Score Severity   1-4 No Depression   5-9 Mild " Depression   10-14 Moderate Depression   15-19 Moderately Severe Depression   20-27 Severe Depression    Medical Records/Labs/Diagnostic Tests Reviewed:  [place] PDMP records - appropriate refills      Impression:  1.  Generalized anxiety disorder - stable  2.  Social anxiety disorder - stable  3.  Major depressive disorder, recurrent, mild - stable     4.  ADHD, inattentive type - stable     Plan:  1.  Continue Adderall XR 15 mg twice daily for ADHD.  E-prescribed x 2 months.  2.  Continue Wellbutrin  mg in the morning for depression and anxiety  3.  Continue Zoloft 100 mg in the afternoon for depression and anxiety.   4.  Continue individual psychotherapy with ZEB Serrato  5.  He is agreeable to continue his current medication combination for the next 2 months.  He has talked to the pharmacy who will try and get him Wellbutrin from his preferred pharmaceutical company.  He we will continue to assess his mood and anxiety symptoms and see if things improve or not.  If he does not notice any significant change, we will discuss transitioning Zoloft to Prozac.    Return to clinic in 2 months or sooner if symptoms worsen    The proposed treatment plan was discussed with the patient who was provided the opportunity to ask questions and make suggestions regarding alternative treatment. Patient verbalized understanding and expressed agreement with the plan.     Leona Emerson M.D.  11/05/24    This note was created using voice recognition software (Dragon). The accuracy of the dictation is limited by the abilities of the software. I have reviewed the note prior to signing, however some errors in grammar and context are still possible. If you have any questions related to this note please do not hesitate to contact our office.

## 2024-11-08 ENCOUNTER — PATIENT MESSAGE (OUTPATIENT)
Dept: BEHAVIORAL HEALTH | Facility: CLINIC | Age: 24
End: 2024-11-08
Payer: COMMERCIAL

## 2024-11-08 DIAGNOSIS — F33.41 MDD (MAJOR DEPRESSIVE DISORDER), RECURRENT, IN PARTIAL REMISSION (HCC): ICD-10-CM

## 2024-11-08 DIAGNOSIS — F40.10 SOCIAL PHOBIA: ICD-10-CM

## 2024-11-08 DIAGNOSIS — F41.1 GENERALIZED ANXIETY DISORDER: ICD-10-CM

## 2024-11-08 RX ORDER — BUPROPION HYDROCHLORIDE 200 MG/1
200 TABLET, EXTENDED RELEASE ORAL EVERY MORNING
Qty: 90 TABLET | Refills: 0 | Status: SHIPPED | OUTPATIENT
Start: 2024-11-08

## 2024-11-08 NOTE — PATIENT COMMUNICATION
Can you please send bupropion to new preferred pharmacy?    Received request via: Patient    Was the patient seen in the last year in this department? Yes    Does the patient have an active prescription (recently filled or refills available) for medication(s) requested? No    Pharmacy Name: [facility]    Does the patient have retirement Plus and need 100-day supply? (This applies to ALL medications) Patient does not have SCP

## 2025-01-01 DIAGNOSIS — F33.41 MDD (MAJOR DEPRESSIVE DISORDER), RECURRENT, IN PARTIAL REMISSION (HCC): ICD-10-CM

## 2025-01-01 DIAGNOSIS — F41.1 GENERALIZED ANXIETY DISORDER: ICD-10-CM

## 2025-01-01 DIAGNOSIS — F40.10 SOCIAL PHOBIA: ICD-10-CM

## 2025-01-02 RX ORDER — SERTRALINE HYDROCHLORIDE 100 MG/1
100 TABLET, FILM COATED ORAL
Qty: 30 TABLET | Refills: 0 | Status: SHIPPED | OUTPATIENT
Start: 2025-01-02 | End: 2025-01-09 | Stop reason: SDUPTHER

## 2025-01-09 ENCOUNTER — TELEMEDICINE (OUTPATIENT)
Dept: BEHAVIORAL HEALTH | Facility: CLINIC | Age: 25
End: 2025-01-09
Payer: COMMERCIAL

## 2025-01-09 DIAGNOSIS — F40.10 SOCIAL PHOBIA: ICD-10-CM

## 2025-01-09 DIAGNOSIS — F90.0 ADHD (ATTENTION DEFICIT HYPERACTIVITY DISORDER), INATTENTIVE TYPE: ICD-10-CM

## 2025-01-09 DIAGNOSIS — F41.1 GENERALIZED ANXIETY DISORDER: ICD-10-CM

## 2025-01-09 DIAGNOSIS — F33.0 MDD (MAJOR DEPRESSIVE DISORDER), RECURRENT EPISODE, MILD (HCC): ICD-10-CM

## 2025-01-09 PROCEDURE — 99214 OFFICE O/P EST MOD 30 MIN: CPT | Mod: 95 | Performed by: PSYCHIATRY & NEUROLOGY

## 2025-01-09 RX ORDER — SERTRALINE HYDROCHLORIDE 100 MG/1
100 TABLET, FILM COATED ORAL
Qty: 30 TABLET | Refills: 2 | Status: SHIPPED | OUTPATIENT
Start: 2025-01-09

## 2025-01-09 RX ORDER — DEXTROAMPHETAMINE SACCHARATE, AMPHETAMINE ASPARTATE MONOHYDRATE, DEXTROAMPHETAMINE SULFATE AND AMPHETAMINE SULFATE 3.75; 3.75; 3.75; 3.75 MG/1; MG/1; MG/1; MG/1
15 CAPSULE, EXTENDED RELEASE ORAL 2 TIMES DAILY
Qty: 60 CAPSULE | Refills: 0 | Status: SHIPPED | OUTPATIENT
Start: 2025-02-10 | End: 2025-03-12

## 2025-01-09 RX ORDER — DEXTROAMPHETAMINE SACCHARATE, AMPHETAMINE ASPARTATE MONOHYDRATE, DEXTROAMPHETAMINE SULFATE AND AMPHETAMINE SULFATE 3.75; 3.75; 3.75; 3.75 MG/1; MG/1; MG/1; MG/1
15 CAPSULE, EXTENDED RELEASE ORAL 2 TIMES DAILY
Qty: 60 CAPSULE | Refills: 0 | Status: SHIPPED | OUTPATIENT
Start: 2025-01-12 | End: 2025-02-11

## 2025-01-09 RX ORDER — DEXTROAMPHETAMINE SACCHARATE, AMPHETAMINE ASPARTATE MONOHYDRATE, DEXTROAMPHETAMINE SULFATE AND AMPHETAMINE SULFATE 3.75; 3.75; 3.75; 3.75 MG/1; MG/1; MG/1; MG/1
15 CAPSULE, EXTENDED RELEASE ORAL 2 TIMES DAILY
Qty: 60 CAPSULE | Refills: 0 | Status: SHIPPED | OUTPATIENT
Start: 2025-03-11 | End: 2025-04-10

## 2025-01-09 RX ORDER — BUPROPION HYDROCHLORIDE 200 MG/1
200 TABLET, EXTENDED RELEASE ORAL EVERY MORNING
Qty: 30 TABLET | Refills: 2 | Status: SHIPPED | OUTPATIENT
Start: 2025-01-09

## 2025-01-09 ASSESSMENT — PATIENT HEALTH QUESTIONNAIRE - PHQ9
3. TROUBLE FALLING OR STAYING ASLEEP OR SLEEPING TOO MUCH: 1
6. FEELING BAD ABOUT YOURSELF - OR THAT YOU ARE A FAILURE OR HAVE LET YOURSELF OR YOUR FAMILY DOWN: 1
7. TROUBLE CONCENTRATING ON THINGS, SUCH AS READING THE NEWSPAPER OR WATCHING TELEVISION: 2
2. FEELING DOWN, DEPRESSED, IRRITABLE, OR HOPELESS: 1
1. LITTLE INTEREST OR PLEASURE IN DOING THINGS: 3
5. POOR APPETITE OR OVEREATING: 1
7. TROUBLE CONCENTRATING ON THINGS, SUCH AS READING THE NEWSPAPER OR WATCHING TELEVISION: NEARLY EVERY DAY
8. MOVING OR SPEAKING SO SLOWLY THAT OTHER PEOPLE COULD HAVE NOTICED. OR THE OPPOSITE, BEING SO FIGETY OR RESTLESS THAT YOU HAVE BEEN MOVING AROUND A LOT MORE THAN USUAL: 0
5. POOR APPETITE OR OVEREATING: SEVERAL DAYS
7. TROUBLE CONCENTRATING ON THINGS, SUCH AS READING THE NEWSPAPER OR WATCHING TELEVISION: MORE THAN HALF THE DAYS
6. FEELING BAD ABOUT YOURSELF - OR THAT YOU ARE A FAILURE OR HAVE LET YOURSELF OR YOUR FAMILY DOWN: SEVERAL DAYS
5. POOR APPETITE OR OVEREATING: SEVERAL DAYS
CLINICAL INTERPRETATION OF PHQ2 SCORE: 0
3. TROUBLE FALLING OR STAYING ASLEEP OR SLEEPING TOO MUCH: SEVERAL DAYS
SUM OF ALL RESPONSES TO PHQ9 QUESTIONS 1 AND 2: 4
1. LITTLE INTEREST OR PLEASURE IN DOING THINGS: NEARLY EVERY DAY
9. THOUGHTS THAT YOU WOULD BE BETTER OFF DEAD, OR OF HURTING YOURSELF: 0
9. THOUGHTS THAT YOU WOULD BE BETTER OFF DEAD, OR OF HURTING YOURSELF: NOT AT ALL
2. FEELING DOWN, DEPRESSED, IRRITABLE, OR HOPELESS: SEVERAL DAYS
8. MOVING OR SPEAKING SO SLOWLY THAT OTHER PEOPLE COULD HAVE NOTICED. OR THE OPPOSITE, BEING SO FIGETY OR RESTLESS THAT YOU HAVE BEEN MOVING AROUND A LOT MORE THAN USUAL: NOT AT ALL
SUM OF ALL RESPONSES TO PHQ QUESTIONS 1-9: 12
SUM OF ALL RESPONSES TO PHQ QUESTIONS 1-9: 13
4. FEELING TIRED OR HAVING LITTLE ENERGY: 3
1. LITTLE INTEREST OR PLEASURE IN DOING THINGS: NEARLY EVERY DAY
2. FEELING DOWN, DEPRESSED, IRRITABLE, OR HOPELESS: SEVERAL DAYS
9. THOUGHTS THAT YOU WOULD BE BETTER OFF DEAD, OR OF HURTING YOURSELF: NOT AT ALL
8. MOVING OR SPEAKING SO SLOWLY THAT OTHER PEOPLE COULD HAVE NOTICED. OR THE OPPOSITE, BEING SO FIGETY OR RESTLESS THAT YOU HAVE BEEN MOVING AROUND A LOT MORE THAN USUAL: NOT AT ALL
10. IF YOU CHECKED OFF ANY PROBLEMS, HOW DIFFICULT HAVE THESE PROBLEMS MADE IT FOR YOU TO DO YOUR WORK, TAKE CARE OF THINGS AT HOME, OR GET ALONG WITH OTHER PEOPLE: VERY DIFFICULT
5. POOR APPETITE OR OVEREATING: 1 - SEVERAL DAYS
3. TROUBLE FALLING OR STAYING ASLEEP OR SLEEPING TOO MUCH: SEVERAL DAYS
4. FEELING TIRED OR HAVING LITTLE ENERGY: NEARLY EVERY DAY
6. FEELING BAD ABOUT YOURSELF - OR THAT YOU ARE A FAILURE OR HAVE LET YOURSELF OR YOUR FAMILY DOWN: SEVERAL DAYS
4. FEELING TIRED OR HAVING LITTLE ENERGY: NEARLY EVERY DAY

## 2025-01-09 NOTE — PROGRESS NOTES
PSYCHIATRY VIRTUAL VISIT FOLLOW-UP NOTE    Chief Complaint   Patient presents with    Follow-Up     Depression, anxiety     This evaluation was conducted via Teams using secure and encrypted videoconferencing technology.   The patient was in their home in the [facility].    The patient's identity was confirmed and verbal consent was obtained for this virtual visit.    History Of Present Illness:  Mike Miller is a 24 y.o. male with major depressive disorder, generalized anxiety disorder, social anxiety disorder, ADHD, sleep apnea on CPAP comes in today for follow up, was last seen 2 months ago.  He is doing fair in regards to his mental health.  He denies feeling sad but continues to struggle with enjoying things in his life.  He mentions that going to the gym and exercising used to be really exciting for him previously but for the last couple of years he has not been able to get himself to go to gym on a regular basis.  He is eating healthy and using an exercise bike at home but has not gone to the gym on a regular basis.  He denies any new psychosocial stressors.  He does feel that he is finding it hard to feel happy in his day to day life.  He is also having struggles with fatigue even though he is compliant with his psychiatric medications as well as CPAP machine.  He is getting a good night sleep.  He denies having thoughts of wanting to hurt himself.    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    Substance Use:  Alcohol - Drinks in social settings  Nicotine - Denies  Cannabis - Denies  Illicit drugs - Denies     Past Medication Trials:  None    Psychological testing with Dr. Maciel Boo, Ph.D (12/27/23) - ADHD, inattentive type, moderate to severe    Medications:  Current Outpatient Medications   Medication Sig Dispense Refill    [START ON 1/12/2025] amphetamine-dextroamphetamine  "(ADDERALL XR) 15 MG XR capsule Take 1 Capsule by mouth 2 times a day for 30 days. 60 Capsule 0    [START ON 2/10/2025] amphetamine-dextroamphetamine (ADDERALL XR) 15 MG XR capsule Take 1 Capsule by mouth 2 times a day for 30 days. 60 Capsule 0    [START ON 3/11/2025] amphetamine-dextroamphetamine (ADDERALL XR) 15 MG XR capsule Take 1 Capsule by mouth 2 times a day for 30 days. 60 Capsule 0    buPROPion (WELLBUTRIN SR) 200 MG SR tablet Take 1 Tablet by mouth every morning. 30 Tablet 2    sertraline (ZOLOFT) 100 MG Tab Take 1 Tablet by mouth every day with lunch. 30 Tablet 2    amphetamine-dextroamphetamine (ADDERALL XR) 15 MG XR capsule Take 1 Capsule by mouth 2 times a day for 30 days. 60 Capsule 0    albuterol 108 (90 Base) MCG/ACT Aero Soln inhalation aerosol Inhale 2 Puffs every four hours as needed for Shortness of Breath. 1 Each 6     No current facility-administered medications for this visit.     Review Of Systems:    Psychiatric - Positive for depression, anxiety    Physical Examination:  Vital signs: There were no vitals taken for this visit.    Musculoskeletal: No abnormal movements.     Mental Status Evaluation:   General: Young male, dressed in casual attire, good grooming and hygiene, in no apparent distress, calm and cooperative, good eye contact, no psychomotor agitation or retardation   Orientation: Alert and oriented to person, place and time  Recent and remote memory: Grossly intact  Attention span and concentration: Grossly intact  Speech: Spontaneous, normal rate, rhythm and tone  Thought Process: Linear, logical and goal directed  Thought Content: Denies suicidal or homicidal ideations, intent or plan  Perception: No delusions noted  Associations: Intact  Language: Appropriate  Fund of knowledge and vocabulary: Grossly adequate  Mood: \"good\"  Affect: Euthymic, mood incongruent  Insight: Good  Judgment: Good    Depression screenin/9/2024     1:00 PM 2025    12:00 PM 2025    12:12 " PM   Depression Screen (PHQ-2/PHQ-9)   PHQ-2 Total Score   4   PHQ-2 Total Score 0 0    PHQ-9 Total Score   13     Interpretation of PHQ-9 Total Score   Score Severity   1-4 No Depression   5-9 Mild Depression   10-14 Moderate Depression   15-19 Moderately Severe Depression   20-27 Severe Depression    Medical Records/Labs/Diagnostic Tests Reviewed:  [place] PDMP records - appropriate refills      Impression:  1.  Generalized anxiety disorder - stable  2.  Social anxiety disorder - stable  3.  Major depressive disorder, recurrent, mild - stable     4.  ADHD, inattentive type - stable     Plan:  1.  Continue Adderall XR 15 mg twice daily for ADHD.  E-prescribed x 3 months.  2.  Continue Wellbutrin  mg in the morning and Zoloft 100 mg in the afternoon for depression and anxiety  3.  I have advised him to schedule appointment with PCP for workup of fatigue.  He has not had any blood work done since 2020 and will benefit from ruling out any physical illness contributing to symptoms.  Discussed emotional numbing can happen with antidepressant medications and if his labwork is normal, will consider tapering him off Zoloft in the next 3 to 6 months.  4.  Continue individual psychotherapy with ZEB Serrato    Return to clinic in 3 months or sooner if symptoms worsen    The proposed treatment plan was discussed with the patient who was provided the opportunity to ask questions and make suggestions regarding alternative treatment. Patient verbalized understanding and expressed agreement with the plan.     Leona Emerson M.D.  01/09/25    This note was created using voice recognition software (Dragon). The accuracy of the dictation is limited by the abilities of the software. I have reviewed the note prior to signing, however some errors in grammar and context are still possible. If you have any questions related to this note please do not hesitate to contact our office.

## 2025-01-21 ENCOUNTER — OFFICE VISIT (OUTPATIENT)
Dept: MEDICAL GROUP | Facility: MEDICAL CENTER | Age: 25
End: 2025-01-21
Payer: COMMERCIAL

## 2025-01-21 ENCOUNTER — HOSPITAL ENCOUNTER (OUTPATIENT)
Dept: LAB | Facility: MEDICAL CENTER | Age: 25
End: 2025-01-21
Attending: STUDENT IN AN ORGANIZED HEALTH CARE EDUCATION/TRAINING PROGRAM
Payer: COMMERCIAL

## 2025-01-21 VITALS
WEIGHT: 184 LBS | HEART RATE: 81 BPM | RESPIRATION RATE: 13 BRPM | HEIGHT: 66 IN | OXYGEN SATURATION: 95 % | BODY MASS INDEX: 29.57 KG/M2 | TEMPERATURE: 98.2 F | DIASTOLIC BLOOD PRESSURE: 70 MMHG | SYSTOLIC BLOOD PRESSURE: 102 MMHG

## 2025-01-21 DIAGNOSIS — F33.41 MDD (MAJOR DEPRESSIVE DISORDER), RECURRENT, IN PARTIAL REMISSION (HCC): ICD-10-CM

## 2025-01-21 DIAGNOSIS — R53.83 FATIGUE, UNSPECIFIED TYPE: ICD-10-CM

## 2025-01-21 DIAGNOSIS — E66.3 OVERWEIGHT (BMI 25.0-29.9): ICD-10-CM

## 2025-01-21 DIAGNOSIS — F41.1 GENERALIZED ANXIETY DISORDER: ICD-10-CM

## 2025-01-21 DIAGNOSIS — B35.1 ONYCHOMYCOSIS: ICD-10-CM

## 2025-01-21 DIAGNOSIS — G47.33 OSA ON CPAP: ICD-10-CM

## 2025-01-21 LAB
ALBUMIN SERPL BCP-MCNC: 4.9 G/DL (ref 3.2–4.9)
ALBUMIN/GLOB SERPL: 1.8 G/DL
ALP SERPL-CCNC: 71 U/L (ref 30–99)
ALT SERPL-CCNC: 21 U/L (ref 2–50)
ANION GAP SERPL CALC-SCNC: 13 MMOL/L (ref 7–16)
AST SERPL-CCNC: 19 U/L (ref 12–45)
BASOPHILS # BLD AUTO: 0.2 % (ref 0–1.8)
BASOPHILS # BLD: 0.01 K/UL (ref 0–0.12)
BILIRUB SERPL-MCNC: 0.5 MG/DL (ref 0.1–1.5)
BUN SERPL-MCNC: 19 MG/DL (ref 8–22)
CALCIUM ALBUM COR SERPL-MCNC: 8.8 MG/DL (ref 8.5–10.5)
CALCIUM SERPL-MCNC: 9.5 MG/DL (ref 8.5–10.5)
CHLORIDE SERPL-SCNC: 105 MMOL/L (ref 96–112)
CHOLEST SERPL-MCNC: 192 MG/DL (ref 100–199)
CO2 SERPL-SCNC: 24 MMOL/L (ref 20–33)
CREAT SERPL-MCNC: 1.06 MG/DL (ref 0.5–1.4)
EOSINOPHIL # BLD AUTO: 0 K/UL (ref 0–0.51)
EOSINOPHIL NFR BLD: 0 % (ref 0–6.9)
ERYTHROCYTE [DISTWIDTH] IN BLOOD BY AUTOMATED COUNT: 42.5 FL (ref 35.9–50)
EST. AVERAGE GLUCOSE BLD GHB EST-MCNC: 94 MG/DL
FASTING STATUS PATIENT QL REPORTED: NORMAL
GFR SERPLBLD CREATININE-BSD FMLA CKD-EPI: 99 ML/MIN/1.73 M 2
GLOBULIN SER CALC-MCNC: 2.8 G/DL (ref 1.9–3.5)
GLUCOSE SERPL-MCNC: 75 MG/DL (ref 65–99)
HBA1C MFR BLD: 4.9 % (ref 4–5.6)
HCT VFR BLD AUTO: 49 % (ref 42–52)
HDLC SERPL-MCNC: 43 MG/DL
HGB BLD-MCNC: 16.6 G/DL (ref 14–18)
IMM GRANULOCYTES # BLD AUTO: 0.01 K/UL (ref 0–0.11)
IMM GRANULOCYTES NFR BLD AUTO: 0.2 % (ref 0–0.9)
LDLC SERPL CALC-MCNC: 126 MG/DL
LYMPHOCYTES # BLD AUTO: 1.75 K/UL (ref 1–4.8)
LYMPHOCYTES NFR BLD: 29.9 % (ref 22–41)
MCH RBC QN AUTO: 29.3 PG (ref 27–33)
MCHC RBC AUTO-ENTMCNC: 33.9 G/DL (ref 32.3–36.5)
MCV RBC AUTO: 86.4 FL (ref 81.4–97.8)
MONOCYTES # BLD AUTO: 0.6 K/UL (ref 0–0.85)
MONOCYTES NFR BLD AUTO: 10.3 % (ref 0–13.4)
NEUTROPHILS # BLD AUTO: 3.48 K/UL (ref 1.82–7.42)
NEUTROPHILS NFR BLD: 59.4 % (ref 44–72)
NRBC # BLD AUTO: 0 K/UL
NRBC BLD-RTO: 0 /100 WBC (ref 0–0.2)
PLATELET # BLD AUTO: 264 K/UL (ref 164–446)
PMV BLD AUTO: 9.2 FL (ref 9–12.9)
POTASSIUM SERPL-SCNC: 4.3 MMOL/L (ref 3.6–5.5)
PROT SERPL-MCNC: 7.7 G/DL (ref 6–8.2)
RBC # BLD AUTO: 5.67 M/UL (ref 4.7–6.1)
SODIUM SERPL-SCNC: 142 MMOL/L (ref 135–145)
TRIGL SERPL-MCNC: 115 MG/DL (ref 0–149)
TSH SERPL DL<=0.005 MIU/L-ACNC: 1.56 UIU/ML (ref 0.38–5.33)
WBC # BLD AUTO: 5.9 K/UL (ref 4.8–10.8)

## 2025-01-21 PROCEDURE — 83036 HEMOGLOBIN GLYCOSYLATED A1C: CPT

## 2025-01-21 PROCEDURE — 80053 COMPREHEN METABOLIC PANEL: CPT

## 2025-01-21 PROCEDURE — 85025 COMPLETE CBC W/AUTO DIFF WBC: CPT

## 2025-01-21 PROCEDURE — 99214 OFFICE O/P EST MOD 30 MIN: CPT | Performed by: STUDENT IN AN ORGANIZED HEALTH CARE EDUCATION/TRAINING PROGRAM

## 2025-01-21 PROCEDURE — 36415 COLL VENOUS BLD VENIPUNCTURE: CPT

## 2025-01-21 PROCEDURE — 84443 ASSAY THYROID STIM HORMONE: CPT

## 2025-01-21 PROCEDURE — 3074F SYST BP LT 130 MM HG: CPT | Performed by: STUDENT IN AN ORGANIZED HEALTH CARE EDUCATION/TRAINING PROGRAM

## 2025-01-21 PROCEDURE — 80061 LIPID PANEL: CPT

## 2025-01-21 PROCEDURE — 3078F DIAST BP <80 MM HG: CPT | Performed by: STUDENT IN AN ORGANIZED HEALTH CARE EDUCATION/TRAINING PROGRAM

## 2025-01-21 RX ORDER — TERBINAFINE HYDROCHLORIDE 250 MG/1
250 TABLET ORAL DAILY
Qty: 60 TABLET | Refills: 0 | Status: SHIPPED | OUTPATIENT
Start: 2025-01-21

## 2025-01-21 RX ORDER — CICLOPIROX 80 MG/ML
SOLUTION TOPICAL
Qty: 6 ML | Refills: 3 | Status: SHIPPED | OUTPATIENT
Start: 2025-01-21

## 2025-01-21 ASSESSMENT — ENCOUNTER SYMPTOMS
VOMITING: 0
FEVER: 0
WEIGHT LOSS: 0
PALPITATIONS: 0
NAUSEA: 0
DIZZINESS: 0
CHILLS: 0
SHORTNESS OF BREATH: 0
WHEEZING: 0
HEADACHES: 0

## 2025-01-21 NOTE — PROGRESS NOTES
"Subjective:     CC: Fatigue    HPI:   Mike presents today with    PMH IRMA/depression/mild asthma, ant on cpap  Follows with behavioral health    Patient presents for follow-up on fatigue  Is a past medical history of chronic fatigue, previously diagnosed with obstructive sleep apnea on CPAP, also following with behavioral health on Adderall 15 mg twice a day.  He is concerned about postprandial fatigue he reported he did not measure his blood sugar after eating and it has always been above 80.  Fasting sugar has been less than 100 he does note a family history of diabetes.  He has some minor abnormalities from labs in 2011 of unknown significance.    He is concerned about possibly insulin resistance/insulin excess.  Discussed diagnosis criteria for postprandial hypoglycemia, utility of checking insulin levels      Health Maintenance:     ROS:  Review of Systems   Constitutional:  Negative for chills, fever and weight loss.   HENT:  Negative for hearing loss.    Respiratory:  Negative for shortness of breath and wheezing.    Cardiovascular:  Negative for chest pain and palpitations.   Gastrointestinal:  Negative for nausea and vomiting.   Genitourinary:  Negative for frequency and urgency.   Skin:  Negative for rash.   Neurological:  Negative for dizziness and headaches.       Objective:     Exam:  /70 (BP Location: Left arm)   Pulse 81   Temp 36.8 °C (98.2 °F)   Resp 13   Ht 1.676 m (5' 6\")   Wt 83.5 kg (184 lb)   SpO2 95%   BMI 29.70 kg/m²  Body mass index is 29.7 kg/m².    Physical Exam  Constitutional:       Appearance: Normal appearance.   Cardiovascular:      Rate and Rhythm: Normal rate and regular rhythm.   Pulmonary:      Effort: Pulmonary effort is normal.      Breath sounds: Normal breath sounds.   Musculoskeletal:      Cervical back: Normal range of motion and neck supple.   Neurological:      Mental Status: He is alert.           Labs:     Assessment & Plan:     24 y.o. male with the " following -     1.  Overweight BMI 25-29  Chronic, stable  Will check labs  - CBC WITHOUT DIFFERENTIAL; Future  - Lipid Profile; Future  - TSH WITH REFLEX TO FT4; Future  - HEMOGLOBIN A1C; Future  - Comp Metabolic Panel; Future    2. IRMA (generalized anxiety disorder)  3. MDD (major depressive disorder), recurrent, in partial remission (HCC)  Chronic, stable follows with behavioral health on currently on Wellbutrin, Zoloft, Adderall 15 mg twice daily  Denies any adverse effect from medication  - CBC WITHOUT DIFFERENTIAL; Future  - Lipid Profile; Future  - TSH WITH REFLEX TO FT4; Future  - HEMOGLOBIN A1C; Future  - Comp Metabolic Panel; Future    4. ABBI on CPAP  Chronic, stable reported adherence    5. Fatigue, unspecified type  Chronic, stable  Chronic history of fatigue ABBI treated, follows with behavioral health on a depressant and Adderall.  He notes postprandial fatigue and is concerned about insulin resistance.  Weight.  He has been measuring his blood sugar which has remained above 80 postprandially and fasting sugar is less than 100 above 90.  Plan  We discussed criteria for diagnosis of postprandial hypoglycemia we discussed insulin resistance  At this time I think it is reasonable to start basic blood work for evaluation other than screening for each individual vitamin deficiencies for checking insulin patient agreeable to plan  - CBC with DIFFERENTIAL; Future  - Lipid Profile; Future  - TSH WITH REFLEX TO FT4; Future  - HEMOGLOBIN A1C; Future  - Comp Metabolic Panel; Future      6.onychomycosis  Patient report previous trial OTC medications with failure  He is interested in starting terbinafine we will due to terbinafine 250 mg daily pulsed therapy.  Prescription sent to pharmacy also sent in ciclopirox solution  Patient will obtain CMP 1 month after starting terbinafine  Adverse effect counseling provided    Return in about 3 months (around 4/21/2025) for Lab review, Med check.    Please note that this  dictation was created using voice recognition software. I have made every reasonable attempt to correct obvious errors, but I expect that there are errors of grammar and possibly content that I did not discover before finalizing the note.

## 2025-02-24 ENCOUNTER — APPOINTMENT (OUTPATIENT)
Dept: MEDICAL GROUP | Facility: MEDICAL CENTER | Age: 25
End: 2025-02-24
Payer: COMMERCIAL

## (undated) DEVICE — HUMID-VENT HEAT AND MOISTURE EXCHANGE- (50/BX)

## (undated) DEVICE — PADDING CAST 4 IN STERILE - 4 X 4 YDS (24/CA)

## (undated) DEVICE — MASK AIRWAY SIZE 4 UNIQUE SILICON (10EA/BX)

## (undated) DEVICE — GLOVE BIOGEL SZ 8 SURGICAL PF LTX - (50PR/BX 4BX/CA)

## (undated) DEVICE — DRAPE C-ARM LARGE 41IN X 74 IN - (10/BX 2BX/CA)

## (undated) DEVICE — DRESSING XEROFORM 1X8 - (50/BX 4BX/CA)

## (undated) DEVICE — MASK ANESTHESIA ADULT  - (100/CA)

## (undated) DEVICE — GLOVE BIOGEL PI ULTRATOUCH SZ 7.5 SURGICAL PF LF -(50/BX 4BX/CA)

## (undated) DEVICE — GOWN WARMING STANDARD FLEX - (30/CA)

## (undated) DEVICE — PROTECTOR ULNA NERVE - (36PR/CA)

## (undated) DEVICE — BAG, SPONGE COUNT 50600

## (undated) DEVICE — TUBE CONNECTING SUCTION - CLEAR PLASTIC STERILE 72 IN (50EA/CA)

## (undated) DEVICE — KIT ANESTHESIA W/CIRCUIT & 3/LT BAG W/FILTER (20EA/CA)

## (undated) DEVICE — SUTURE 4-0 ETHILON PS-2 18 (12PK/BX)"

## (undated) DEVICE — GLOVE BIOGEL PI INDICATOR SZ 7.5 SURGICAL PF LF -(50/BX 4BX/CA)

## (undated) DEVICE — ELECTRODE DUAL RETURN W/ CORD - (50/PK)

## (undated) DEVICE — SUCTION INSTRUMENT YANKAUER BULBOUS TIP W/O VENT (50EA/CA)

## (undated) DEVICE — KIT ROOM DECONTAMINATION

## (undated) DEVICE — SODIUM CHL IRRIGATION 0.9% 1000ML (12EA/CA)

## (undated) DEVICE — GLOVE BIOGEL INDICATOR SZ 8 SURGICAL PF LTX - (50/BX 4BX/CA)

## (undated) DEVICE — SENSOR SPO2 NEO LNCS ADHESIVE (20/BX) SEE USER NOTES

## (undated) DEVICE — GLOVE BIOGEL SZ 7.5 SURGICAL PF LTX - (50PR/BX 4BX/CA)

## (undated) DEVICE — HEAD HOLDER JUNIOR/ADULT

## (undated) DEVICE — SUTURE 3-0 VICRYL PLUS SH - 8X 18 INCH (12/BX)

## (undated) DEVICE — ELECTRODE 850 FOAM ADHESIVE - HYDROGEL RADIOTRNSPRNT (50/PK)

## (undated) DEVICE — WATER IRRIGATION STERILE 1000ML (12EA/CA)

## (undated) DEVICE — CANISTER SUCTION RIGID RED 1500CC (40EA/CA)

## (undated) DEVICE — STOCKINET BIAS 4 IN STERILE - (20/CA)

## (undated) DEVICE — SUTURE 5-0 PROLENE BV-1 HEMOSEAL (36PK/BX)

## (undated) DEVICE — NEPTUNE 4 PORT MANIFOLD - (20/PK)

## (undated) DEVICE — LACTATED RINGERS INJ 1000 ML - (14EA/CA 60CA/PF)

## (undated) DEVICE — GLOVE BIOGEL PI INDICATOR SZ 8.0 SURGICAL PF LF -(50/BX 4BX/CA)

## (undated) DEVICE — SPLINT PLASTER 3 IN X 15 IN - (50/BX 12BX/CA)

## (undated) DEVICE — PACK UPPER EXTREMITY SM OR - (3/CA)

## (undated) DEVICE — SPONGE GAUZE STER 4X4 8-PL - (2/PK 50PK/BX 12BX/CS)

## (undated) DEVICE — CHLORAPREP 26 ML APPLICATOR - ORANGE TINT(25/CA)

## (undated) DEVICE — STOCKINET BIAS 6 IN STERILE - (20/CA)

## (undated) DEVICE — SUTURE GENERAL

## (undated) DEVICE — GLOVE, LITE (PAIR)